# Patient Record
Sex: FEMALE | Race: WHITE | NOT HISPANIC OR LATINO | Employment: PART TIME | URBAN - METROPOLITAN AREA
[De-identification: names, ages, dates, MRNs, and addresses within clinical notes are randomized per-mention and may not be internally consistent; named-entity substitution may affect disease eponyms.]

---

## 2017-06-10 ENCOUNTER — ALLSCRIPTS OFFICE VISIT (OUTPATIENT)
Dept: OTHER | Facility: OTHER | Age: 57
End: 2017-06-10

## 2017-06-22 ENCOUNTER — GENERIC CONVERSION - ENCOUNTER (OUTPATIENT)
Dept: OTHER | Facility: OTHER | Age: 57
End: 2017-06-22

## 2017-06-26 ENCOUNTER — TRANSCRIBE ORDERS (OUTPATIENT)
Dept: ADMINISTRATIVE | Facility: HOSPITAL | Age: 57
End: 2017-06-26

## 2017-06-26 DIAGNOSIS — Z12.31 VISIT FOR SCREENING MAMMOGRAM: Primary | ICD-10-CM

## 2017-07-05 DIAGNOSIS — R92.8 OTHER ABNORMAL AND INCONCLUSIVE FINDINGS ON DIAGNOSTIC IMAGING OF BREAST: ICD-10-CM

## 2017-07-06 ENCOUNTER — HOSPITAL ENCOUNTER (OUTPATIENT)
Dept: ULTRASOUND IMAGING | Facility: CLINIC | Age: 57
Discharge: HOME | End: 2017-07-06
Payer: COMMERCIAL

## 2017-07-06 ENCOUNTER — HOSPITAL ENCOUNTER (OUTPATIENT)
Dept: MAMMOGRAPHY | Facility: CLINIC | Age: 57
Discharge: HOME | End: 2017-07-06
Payer: COMMERCIAL

## 2017-07-06 DIAGNOSIS — Z12.31 VISIT FOR SCREENING MAMMOGRAM: ICD-10-CM

## 2017-07-06 DIAGNOSIS — R92.8 OTHER ABNORMAL AND INCONCLUSIVE FINDINGS ON DIAGNOSTIC IMAGING OF BREAST: ICD-10-CM

## 2017-07-06 PROCEDURE — 77063 BREAST TOMOSYNTHESIS BI: CPT

## 2017-07-06 PROCEDURE — 76642 ULTRASOUND BREAST LIMITED: CPT

## 2017-07-06 PROCEDURE — G0202 SCR MAMMO BI INCL CAD: HCPCS

## 2017-08-04 ENCOUNTER — GENERIC CONVERSION - ENCOUNTER (OUTPATIENT)
Dept: OTHER | Facility: OTHER | Age: 57
End: 2017-08-04

## 2017-08-08 ENCOUNTER — ALLSCRIPTS OFFICE VISIT (OUTPATIENT)
Dept: OTHER | Facility: OTHER | Age: 57
End: 2017-08-08

## 2017-08-09 ENCOUNTER — GENERIC CONVERSION - ENCOUNTER (OUTPATIENT)
Dept: OTHER | Facility: OTHER | Age: 57
End: 2017-08-09

## 2017-08-09 LAB
A/G RATIO (HISTORICAL): 1.8 (ref 1.2–2.2)
ALBUMIN SERPL BCP-MCNC: 4.4 G/DL (ref 3.5–5.5)
ALP SERPL-CCNC: 61 IU/L (ref 39–117)
ALT SERPL W P-5'-P-CCNC: 24 IU/L (ref 0–32)
AST SERPL W P-5'-P-CCNC: 20 IU/L (ref 0–40)
BASOPHILS # BLD AUTO: 0.1 X10E3/UL (ref 0–0.2)
BASOPHILS # BLD AUTO: 2 %
BILIRUB SERPL-MCNC: 0.5 MG/DL (ref 0–1.2)
BUN SERPL-MCNC: 22 MG/DL (ref 6–24)
BUN/CREA RATIO (HISTORICAL): 23 (ref 9–23)
CALCIUM SERPL-MCNC: 10 MG/DL (ref 8.7–10.2)
CHLORIDE SERPL-SCNC: 103 MMOL/L (ref 96–106)
CHOLEST SERPL-MCNC: 202 MG/DL (ref 100–199)
CHOLEST/HDLC SERPL: 3.4 RATIO UNITS (ref 0–4.4)
CO2 SERPL-SCNC: 22 MMOL/L (ref 18–29)
CREAT SERPL-MCNC: 0.97 MG/DL (ref 0.57–1)
DEPRECATED RDW RBC AUTO: 13.3 % (ref 12.3–15.4)
EGFR AFRICAN AMERICAN (HISTORICAL): 76 ML/MIN/1.73
EGFR-AMERICAN CALC (HISTORICAL): 66 ML/MIN/1.73
EOSINOPHIL # BLD AUTO: 0.1 X10E3/UL (ref 0–0.4)
EOSINOPHIL # BLD AUTO: 3 %
GLUCOSE SERPL-MCNC: 107 MG/DL (ref 65–99)
HCT VFR BLD AUTO: 42.6 % (ref 34–46.6)
HDLC SERPL-MCNC: 60 MG/DL
HGB BLD-MCNC: 14.7 G/DL (ref 11.1–15.9)
IMM.GRANULOCYTES (CD4/8) (HISTORICAL): 0 %
IMM.GRANULOCYTES (CD4/8) (HISTORICAL): 0 X10E3/UL (ref 0–0.1)
LDLC SERPL CALC-MCNC: 121 MG/DL (ref 0–99)
LYMPHOCYTES # BLD AUTO: 1.4 X10E3/UL (ref 0.7–3.1)
LYMPHOCYTES # BLD AUTO: 38 %
MCH RBC QN AUTO: 30.9 PG (ref 26.6–33)
MCHC RBC AUTO-ENTMCNC: 34.5 G/DL (ref 31.5–35.7)
MCV RBC AUTO: 90 FL (ref 79–97)
MONOCYTES # BLD AUTO: 0.4 X10E3/UL (ref 0.1–0.9)
MONOCYTES (HISTORICAL): 11 %
NEUTROPHILS # BLD AUTO: 1.7 X10E3/UL (ref 1.4–7)
NEUTROPHILS # BLD AUTO: 46 %
PLATELET # BLD AUTO: 165 X10E3/UL (ref 150–379)
POTASSIUM SERPL-SCNC: 4.3 MMOL/L (ref 3.5–5.2)
RBC (HISTORICAL): 4.75 X10E6/UL (ref 3.77–5.28)
SODIUM SERPL-SCNC: 140 MMOL/L (ref 134–144)
TOT. GLOBULIN, SERUM (HISTORICAL): 2.5 G/DL (ref 1.5–4.5)
TOTAL PROTEIN (HISTORICAL): 6.9 G/DL (ref 6–8.5)
TRIGL SERPL-MCNC: 103 MG/DL (ref 0–149)
TSH SERPL DL<=0.05 MIU/L-ACNC: 3.83 UIU/ML (ref 0.45–4.5)
VLDLC SERPL CALC-MCNC: 21 MG/DL (ref 5–40)
WBC # BLD AUTO: 3.7 X10E3/UL (ref 3.4–10.8)

## 2017-08-11 LAB
BACTERIA UR QL AUTO: ABNORMAL
BILIRUB UR QL STRIP: NEGATIVE
COLOR UR: YELLOW
COMMENT (HISTORICAL): CLEAR
CULTURE RESULT (HISTORICAL): ABNORMAL
FECAL OCCULT BLOOD DIAGNOSTIC (HISTORICAL): NEGATIVE
GLUCOSE (HISTORICAL): NEGATIVE
KETONES UR STRIP-MCNC: NEGATIVE MG/DL
LEUKOCYTE ESTERASE UR QL STRIP: ABNORMAL
MICROSCOPIC EXAMINATION (HISTORICAL): ABNORMAL
MISCELLANEOUS LAB TEST RESULT (HISTORICAL): ABNORMAL
MUCUS THREADS (HISTORICAL): PRESENT
NITRITE UR QL STRIP: NEGATIVE
NON-SQ EPI CELLS URNS QL MICRO: ABNORMAL /HPF
PH UR STRIP.AUTO: 6 [PH] (ref 5–7.5)
PROT UR STRIP-MCNC: NEGATIVE MG/DL
RBC (HISTORICAL): ABNORMAL /HPF
SP GR UR STRIP.AUTO: 1.02 (ref 1–1.03)
URINALYSIS (UA) (HISTORICAL): ABNORMAL
UROBILINOGEN UR QL STRIP.AUTO: 0.2 EU/DL (ref 0.2–1)
WBC # BLD AUTO: ABNORMAL /HPF

## 2017-08-15 ENCOUNTER — APPOINTMENT (OUTPATIENT)
Dept: RADIOLOGY | Facility: CLINIC | Age: 57
End: 2017-08-15
Payer: COMMERCIAL

## 2017-08-15 ENCOUNTER — ALLSCRIPTS OFFICE VISIT (OUTPATIENT)
Dept: OTHER | Facility: OTHER | Age: 57
End: 2017-08-15

## 2017-08-15 DIAGNOSIS — R05.9 COUGH: ICD-10-CM

## 2017-08-15 PROCEDURE — 71020 HB CHEST X-RAY 2VW FRONTAL&LATL: CPT

## 2017-08-22 ENCOUNTER — GENERIC CONVERSION - ENCOUNTER (OUTPATIENT)
Dept: OTHER | Facility: OTHER | Age: 57
End: 2017-08-22

## 2017-11-07 ENCOUNTER — GENERIC CONVERSION - ENCOUNTER (OUTPATIENT)
Dept: FAMILY MEDICINE CLINIC | Facility: CLINIC | Age: 57
End: 2017-11-07

## 2017-12-08 ENCOUNTER — ALLSCRIPTS OFFICE VISIT (OUTPATIENT)
Dept: OTHER | Facility: OTHER | Age: 57
End: 2017-12-08

## 2017-12-09 NOTE — PROGRESS NOTES
Assessment    1  Acute sinusitis (461 9) (J01 90)    Plan  Acute sinusitis    · Amoxicillin-Pot Clavulanate 875-125 MG Oral Tablet (Augmentin); TAKE 1 TABLETEVERY 12 HOURS UNTIL GONE   · MethylPREDNISolone 4 MG Oral Tablet Therapy Pack; take as directed    Discussion/Summary    Augmentin and prednisone as orderedcareas needed  Chief Complaint  Patient complains of sinus pressure for 3 weeks      History of Present Illness  HPI: 2 week history of cold and sinus pain and pressurecough and congestion line not getting any better has gotten worse over the past week      Review of Systems   Constitutional: No fever, no chills, feels well, no tiredness, no recent weight gain or loss  ENT: no ear ache, no loss of hearing, no nosebleeds or nasal discharge, no sore throat or hoarseness  Cardiovascular: no complaints of slow or fast heart rate, no chest pain, no palpitations, no leg claudication or lower extremity edema  Respiratory: no complaints of shortness of breath, no wheezing, no dyspnea on exertion, no orthopnea or PND  Breasts: no complaints of breast pain, breast lump or nipple discharge  Gastrointestinal: no complaints of abdominal pain, no constipation, no nausea or diarrhea, no vomiting, no bloody stools  Genitourinary: no complaints of dysuria, no incontinence, no pelvic pain, no dysmenorrhea, no vaginal discharge or abnormal vaginal bleeding  Musculoskeletal: no complaints of arthralgia, no myalgia, no joint swelling or stiffness, no limb pain or swelling  Integumentary: no complaints of skin rash or lesion, no itching or dry skin, no skin wounds  Neurological: no complaints of headache, no confusion, no numbness or tingling, no dizziness or fainting  Active Problems  1  Cervical cancer screening (V76 2) (Z12 4)   2  Cough (786 2) (R05)   3  Far-sighted, 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)   4  GERD without esophagitis (530 81) (K21 9)   5  Hyperlipidemia (272 4) (E78 5)   6   Seasonal Pattern Depression (311)    Past Medical History    1  History of Acute maxillary sinusitis (461 0) (J01 00)   2  History of Colonoscopy (Fiberoptic) Screening   3  History of Encounter for screening for malignant neoplasm of colon (V76 51) (Z12 11)   4  History of Encounter for screening mammogram for malignant neoplasm of breast (V76 12) (Z12 31)   5  History of Erythema migrans (Lyme disease) (088 81) (A69 20)   6  History of Hallux rigidus (735 2) (M20 20)   7  History of abnormal mammogram (V15 89) (N03 438)   8  History of wheezing (V12 69) (Z87 898)   9  History of Trigger finger (727 03) (M65 30)  Active Problems And Past Medical History Reviewed: The active problems and past medical history were reviewed and updated today  Family History  Mother    1  Family history of cardiac disorder (V17 49) (Z82 49)  Father    2  Family history of hypertension (V17 49) (Z82 49)   3  Family history of renal failure (V18 69) (Z84 1)   4  No family history of mental disorder  Family History    5  Family history of Coronary Artery Disease (V17 49)   6  Family history of Hypertension (V17 49)  Family History Reviewed: The family history was reviewed and updated today  Social History   · Being A Social Drinker   · Denies alcohol use causing problems   · Exercises regularly   · Never A Smoker   · Occupation:  The social history was reviewed and updated today  The social history was reviewed and is unchanged  Surgical History    1  History of Gynecologic Serv Endometrial Cryoablation W/ Ultrason Guid   2  History of Knee Arthroplasty   3  History of Tonsillectomy With Adenoidectomy  Surgical History Reviewed: The surgical history was reviewed and updated today  Current Meds   1  Atorvastatin Calcium 40 MG Oral Tablet; Take one tab daily; Therapy: 42WGE0597 to (Evaluate:17Jwz5837)  Requested for: 49FVW1754; Last Rx:13Nov2017 Ordered    The medication list was reviewed and updated today  Allergies  1   No Known Drug Allergies    Vitals   Recorded: 17KJL7597 01:45PM   Temperature 97 5 F, Tympanic   Heart Rate 72   Systolic 710, RUE, Sitting   Diastolic 76, RUE, Sitting   Height 5 ft 3 in   Weight 194 lb    BMI Calculated 34 37   BSA Calculated 1 91       Physical Exam   Constitutional  General appearance: No acute distress, well appearing and well nourished  Ears, Nose, Mouth, and Throat  Otoscopic examination: Abnormal  -- Swollen tympanic membranes bilateral with fluid  Nasal mucosa, septum, and turbinates: Abnormal  -- Swollen turbinates with pain around cheeks and forehead  Oropharynx: Normal with no erythema, edema, exudate or lesions           Signatures   Electronically signed by : Mic Fritz DO; Dec  8 2017  7:26PM EST                       (Author)

## 2018-01-09 NOTE — MISCELLANEOUS
Message   Recorded as Task   Date: 08/22/2017 01:16 PM, Created By: Pino Nunez   Task Name: Call Back   Assigned To: Nehal Casey   Regarding Patient: GUSTAVO WASHINGTON, Status: Active   Comment:    Shantel,April - 22 Aug 2017 1:16 PM     TASK CREATED  Caller: Self; Results Inquiry; [phone] (Home); [phone] (Mobile Phone)  requesting results to recent chest xray complted last week  dr Shellie Diallo has order this     can try cell phone and then home phone number  Vandana Hu - 22 Aug 2017 4:42 PM     TASK REASSIGNED: Previously Assigned To Vandana Hu  The CXR is normal- she should follow up next week with Dr Thompson Conner if she is still having an issue  Nehal Casey - 22 Aug 2017 7:16 PM     TASK EDITED  Patient advised:  CXR normal    PLM        Active Problems    1  Cough (786 2) (R05)   2  GERD without esophagitis (530 81) (K21 9)   3  Hyperlipidemia (272 4) (E78 5)   4  Seasonal Pattern Depression (311)    Current Meds   1  Atorvastatin Calcium 40 MG Oral Tablet; Take one tab daily; Therapy: 68IUN4706 to (Evaluate:64Ihy6220)  Requested for: 63Yrf4585; Last   Rx:88Qbp3541 Ordered    Allergies    1   No Known Drug Allergies    Signatures   Electronically signed by : Asiya Ayala, ; Aug 22 2017  7:16PM EST                       (Author)

## 2018-01-10 NOTE — PROGRESS NOTES
Assessment    1  Encounter for preventive health examination (V70 0) (Z00 00)    Plan  Health Maintenance    · Always use a seat belt and shoulder strap when riding or driving a motor vehicle ;  Status:Complete;   Done: 51KCT7756   · Be sure to tell us about all herbal supplements you use ; Status:Complete;   Done:  63ZNO1473   · Begin a limited exercise program ; Status:Complete;   Done: 35EEQ4000   · Begin or continue regular aerobic exercise  Gradually work up to at least 3 sessions of 30  minutes of exercise a week ; Status:Complete;   Done: 94BOF8237   · Brush your teeth freq1 and floss at least once a day ; Status:Complete;   Done:  20QEJ4563   · Drink plenty of fluids ; Status:Complete;   Done: 01AIV7226   · Limit your use of alcohol to 2 drinks or cans of beer a day ; Status:Complete;   Done:  81ROZ5193   · Regular aerobic exercise can help reduce stress ; Status:Complete;   Done: 96FDB9052   · Some eating tips that can help you lose weight ; Status:Complete;   Done: 11PND0956   · Use a sun block product with an SPF of 15 or more ; Status:Complete;   Done: 56RIH3228   · Vitamins can help you get daily requirements that your diet may not be giving you ;  Status:Complete;   Done: 98WPJ3696   · We encourage all of our patients to exercise regularly  30 minutes of exercise or physical  activity five or more days a week is recommended for children and adults ;  Status:Complete;   Done: 08MDA4203   · We recommend a colonoscopy ; Status:Complete;   Done: 63TCS5946   · We recommend that you bring your body mass index down to 26 ; Status:Complete;    Done: 08CRM2543   · We recommend that you follow the "Mediterranean diet "; Status:Complete;   Done:  50MGP9258   · We recommend that you follow these rules for gun safety ; Status:Complete;   Done:  30CUF0286   · We recommend that you follow these steps to lower your risk of osteoporosis  ;  Status:Complete;   Done: 38CZA8950   · We recommend that you get 400 IU of Vitamin D each day ; Status:Complete;   Done:  08ACH0309   · Call [phone] if: You find a new or different kind of lump in your breast ;  Status:Complete;   Done: 93CDB2789   · Call [phone] if: You have any bleeding from the vagina ; Status:Complete;    Done: 18RJW4392   · Call [phone] if: You have any warning signs of skin cancer ; Status:Complete;    Done: 03BJO8606   · Call [phone] if: You experience a new kind of chest pain (angina) or pressure ;  Status:Complete;   Done: 59XJZ7500   · Follow-up visit in 1 year Evaluation and Treatment  Follow-up  Status: Hold For -  Scheduling  Requested for: [address] [address] Maintenance, Hyperlipidemia    · (LC) Thyroxine (T4) Free, Direct, S; Status:Active; Requested for:07Cmm3583;   Influenza vaccine needed    · Stop: Influenza  Need for pneumococcal vaccination    · Pneumo (Pneumovax)    Discussion/Summary  health maintenance visit Currently, she eats a healthy diet and has an adequate exercise regimen  the risks and benefits of cervical cancer screening were discussed cervical cancer screening is current Breast cancer screening: the risks and benefits of breast cancer screening were discussed, mammogram is current and breast cancer screening is managed by  Colorectal cancer screening: the risks and benefits of colorectal cancer screening were discussed and colorectal cancer screening is current  Osteoporosis screening: the risks and benefits of osteoporosis screening were discussed and bone mineral density testing is current  Screening lab work includes hemoglobin, glucose, lipid profile, thyroid function testing and urinalysis  The immunizations are needed and Pneumovax 23 ordered today  Advice and education were given regarding nutrition, aerobic exercise, weight bearing exercise and weight loss  Patient discussion: discussed with the patient       30-year-old female here for comprehensive physical examination  Examination is performed findings largely normal is slightly overweight  She follows with the gynecologist  She has had all the usual preventive services  I reviewed her recent labs and they're all in order however I'm ordering a free T4 because of elevation in her TSH  Will continue on present medications 23 valve on today  Will reappoint in one year  Chief Complaint  Annual Complete Physical Exam  Preventive care: up to date, refuses flu vaccines      History of Present Illness  HM, Adult Female: The patient is being seen for a health maintenance evaluation  The last health maintenance visit was 3yrs year(s) ago  Social History: Household members include spouse  She is   Work status: working full time  The patient has never smoked cigarettes  She reports occasional alcohol use  She has never used illicit drugs  General Health: The patient's health since the last visit is described as good  She has regular dental visits  She denies vision problems  She denies hearing loss  Lifestyle:  She consumes a diverse and healthy diet  She has weight concerns  She exercises regularly  She does not use tobacco  She consumes alcohol  She denies drug use  Reproductive health: the patient is postmenopausal    Screening: cancer screening reviewed and current  metabolic screening reviewed and current  risk screening reviewed and current  HPI: 16-year-old female here for annual preventive service  Over the past year there has been no change in her overall condition      Review of Systems    Constitutional: No fever, no chills, feels well, no tiredness, no recent weight gain or weight loss  Eyes: No complaints of eye pain, no red eyes, no eyesight problems, no discharge, no dry eyes, no itching of eyes  ENT: no complaints of earache, no loss of hearing, no nose bleeds, no nasal discharge, no sore throat, no hoarseness     Cardiovascular: No complaints of slow heart rate, no fast heart rate, no chest pain, no palpitations, no leg claudication, no lower extremity edema  Respiratory: No complaints of shortness of breath, no wheezing, no cough, no SOB on exertion, no orthopnea, no PND  Gastrointestinal: No complaints of abdominal pain, no constipation, no nausea or vomiting, no diarrhea, no bloody stools  Genitourinary: No complaints of dysuria, no incontinence, no pelvic pain, no dysmenorrhea, no vaginal discharge or bleeding  Musculoskeletal: No complaints of arthralgias, no myalgias, no joint swelling or stiffness, no limb pain or swelling  Integumentary: No complaints of skin rash or lesions, no itching, no skin wounds, no breast pain or lump  Neurological: No complaints of headache, no confusion, no convulsions, no numbness, no dizziness or fainting, no tingling, no limb weakness, no difficulty walking  Psychiatric: Not suicidal, no sleep disturbance, no anxiety or depression, no change in personality, no emotional problems  Endocrine: No complaints of proptosis, no hot flashes, no muscle weakness, no deepening of the voice, no feelings of weakness  Hematologic/Lymphatic: No complaints of swollen glands, no swollen glands in the neck, does not bleed easily, does not bruise easily  Active Problems    1  GERD without esophagitis (530 81) (K21 9)   2  Hyperlipidemia (272 4) (E78 5)   3   Seasonal Pattern Depression (311)    Past Medical History    · History of Colonoscopy (Fiberoptic) Screening   · History of Encounter for screening for malignant neoplasm of colon (V76 51) (Z12 11)   · History of Encounter for screening mammogram for malignant neoplasm of breast  (V76 12) (Z12 31)   · History of Erythema migrans (Lyme disease) (088 81) (A69 20)   · History of Hallux rigidus (735 2) (M20 20)   · History of Trigger finger (727 03) (M65 30)    Surgical History    · History of Gynecologic Serv Endometrial Cryoablation W/ Ultrason Guid   · History of Knee Arthroplasty   · History of Tonsillectomy With Adenoidectomy    Family History  Mother · Family history of cardiac disorder (V17 49) (Z80 55)  Father    · Family history of hypertension (V17 49) (Z82 49)   · Family history of renal failure (V18 69) (Z84 1)  Family History    · Family history of Coronary Artery Disease (V17 49)   · Family history of Hypertension (V17 49)    Social History    · Being A Social Drinker   · Exercises regularly   · Never A Smoker   · Occupation:    Current Meds   1  Atorvastatin Calcium 40 MG Oral Tablet; Take one tab daily; Therapy: 86GBD6744 to (Evaluate:38Ayd5777)  Requested for: 99LST3053; Last   Rx:91Anb3858 Ordered   2  Citalopram Hydrobromide 20 MG Oral Tablet; 1-1/2 tablets daily  Requested for:   11OKF2592; Last Rx:63Jtm6848 Ordered    Allergies    1  No Known Drug Allergies    Vitals   Recorded: 62WLT1181 12:07FC   Systolic 996, LUE, Sitting   Diastolic 76, LUE, Sitting   Heart Rate 70   Height 5 ft 3 in   Weight 181 lb    BMI Calculated 32 06   BSA Calculated 1 86     Physical Exam    Constitutional   General appearance: No acute distress, well appearing and well nourished  Head and Face   Head and face: Normal     Palpation of the face and sinuses: No sinus tenderness  Eyes   Conjunctiva and lids: No swelling, erythema or discharge  Pupils and irises: Equal, round, reactive to light  Ears, Nose, Mouth, and Throat   External inspection of ears and nose: Normal     Otoscopic examination: Tympanic membranes translucent with normal light reflex  Canals patent without erythema  Hearing: Normal     Nasal mucosa, septum, and turbinates: Normal without edema or erythema  Lips, teeth, and gums: Normal, good dentition  Oropharynx: Normal with no erythema, edema, exudate or lesions  Neck   Neck: Supple, symmetric, trachea midline, no masses  Thyroid: Normal, no thyromegaly  Pulmonary   Auscultation of lungs: Clear to auscultation  Cardiovascular   Auscultation of heart: Normal rate and rhythm, normal S1 and S2, no murmurs      Carotid pulses: 2+ bilaterally  Abdominal aorta: Normal     Femoral pulses: 2+ bilaterally  Pedal pulses: 2+ bilaterally  Peripheral vascular exam: Normal     Examination of extremities for edema and/or varicosities: Normal     Abdomen   Abdomen: Non-tender, no masses  Liver and spleen: No hepatomegaly or splenomegaly  Lymphatic   Palpation of lymph nodes in neck: No lymphadenopathy  Palpation of lymph nodes in axillae: No lymphadenopathy  Palpation of lymph nodes in groin: No lymphadenopathy  Palpation of lymph nodes in other areas: No lymphadenopathy  Musculoskeletal   Gait and station: Normal     Digits and nails: Normal without clubbing or cyanosis  Joints, bones, and muscles: Normal     Range of motion: Normal     Stability: Normal     Muscle strength/tone: Normal     Skin   Skin and subcutaneous tissue: Normal without rashes or lesions  Palpation of skin and subcutaneous tissue: Normal turgor  Neurologic   Cranial nerves: Cranial nerves II-XII intact  Cortical function: Normal mental status  Reflexes: 2+ and symmetric  Sensation: No sensory loss  Coordination: Normal finger to nose and heel to shin  Psychiatric   Judgment and insight: Normal     Orientation to person, place, and time: Normal     Recent and remote memory: Intact      Mood and affect: Normal        Results/Data  PELVIC EXAM [id number] 12:00AM Letty Flynn     Test Name Result Flag Reference   PELVIC EXAM 6/8/15       (1) THIN PREP PAP WITH IMAGING 54OPF3649 12:00AM Letty Flynn     Test Name Result Flag Reference   THIN PREP PAP W  IMAG COMMENT 5/15/13     LAB AP CASE REPORT 5/15/13     HPV HIGH RISK RESULT 5/15/13     LAB AP GYN PRIMARY INTERPRETATION 5/15/13     LAB AP GYN INTERPRETATION 5/15/13     LAB AP GYN SPECIMEN ADEQUACY 5/15/13     LAB AP GYN NOTE 5/15/13     LAB AP GYN ADDITIONAL INFORMATION 5/15/13     LAB AP CLINICAL INFORMATION 5/15/13         Procedure    Procedure: Visual Acuity Test    Indication: routine screening  Inforrmation supplied by a Snellen chart  Results: 20/20 in the right eye with corrective device, 20/20 in the left eye with corrective device Patient wears glasses  Health Management  Hyperlipidemia   (1) COMPREHENSIVE METABOLIC PANEL; every 1 year; Last 40PXF3254; Next Due:  53JNM7477; Active  (1) HEPATIC FUNCTION PANEL; every 1 year; Last 90IZM5959; Next Due: 58Csw6352; Overdue    Future Appointments    Date/Time Provider Specialty Site   08/15/2017 08:00 AM BRE Rodriguez   Family Medicine [facility], [place], operated [facility]     Signatures   Electronically signed by : BRE Duggan ; Jul 18 2016  8:40AM EST                       (Author)

## 2018-01-12 NOTE — MISCELLANEOUS
Message   Recorded as Task   Date: 07/25/2016 04:00 PM, Created By: Edwina Espinosa   Task Name: Call Back   Assigned To: Nehal Casey   Regarding Patient: GUSTAVO WASHINGTON, Status: Active   CommentPasctobi Rodrigues - 25 Jul 2016 4:00 PM     TASK CREATED  Caller: Self; Results Inquiry; [phone] (Home); [phone] (Work)  Calling for Thyroid blood work results  home [phone] or cell [phone]   Jose Garcia - 25 Jul 2016 5:12 PM     TASK REASSIGNED: Previously Assigned To [employer] her the free T4 was normal therefore does not have a thyroid problem  I think she should have her retest of thyroid function in 6 months   Nehal Casey - 25 Jul 2016 5:26 PM     TASK EDITED  Patient advised  PLM        Active Problems    1  GERD without esophagitis (530 81) (K21 9)   2  Hyperlipidemia (272 4) (E78 5)   3  Influenza vaccine needed (V04 81) (Z23)   4  Need for pneumococcal vaccination (V03 82) (Z23)   5  Seasonal Pattern Depression (311)    Current Meds   1  Atorvastatin Calcium 40 MG Oral Tablet; Take one tab daily; Therapy: 83JBL2939 to (Evaluate:13Qvk7976)  Requested for: 46BYD0992; Last   Rx:42Ols8337 Ordered   2  Citalopram Hydrobromide 20 MG Oral Tablet; 1-1/2 tablets daily  Requested for:   95MMB0787; Last Rx:71Ioe1158 Ordered    Allergies    1   No Known Drug Allergies    Signatures   Electronically signed by : Maksim Roberts, ; Jul 25 2016  5:26PM EST                       (Author)

## 2018-01-13 VITALS
TEMPERATURE: 99.2 F | DIASTOLIC BLOOD PRESSURE: 82 MMHG | WEIGHT: 191 LBS | HEART RATE: 72 BPM | BODY MASS INDEX: 33.84 KG/M2 | HEIGHT: 63 IN | SYSTOLIC BLOOD PRESSURE: 126 MMHG

## 2018-01-15 NOTE — PROGRESS NOTES
Assessment    1  Encounter for preventive health examination (V70 0) (Z00 00)   2  Cough (786 2) (R05)    Plan  Cough    · * XR CHEST PA & LATERAL; Status:Active; Requested for:03Jvh3474; Health Maintenance    · Always use a seat belt and shoulder strap when riding or driving a motor vehicle ;  Status:Complete;   Done: 07VQP8857   · Be sure to tell us about all herbal supplements you use ; Status:Complete;   Done:  32QDF8515   · Begin a limited exercise program ; Status:Complete;   Done: 74WSV4981   · Begin or continue regular aerobic exercise  Gradually work up to at least 3 sessions of 30  minutes of exercise a week ; Status:Complete;   Done: 99QIZ2376   · Brush your teeth {freq1} and floss at least once a day ; Status:Complete;   Done:  95XHS8495   · Drink plenty of fluids ; Status:Complete;   Done: 23EEX3109   · Limit your use of alcohol to 2 drinks or cans of beer a day ; Status:Complete;   Done:  82JEE3596   · Regular aerobic exercise can help reduce stress ; Status:Complete;   Done: 47RLM6068   · Some eating tips that can help you lose weight ; Status:Complete;   Done: 34ZRP9925   · Use a sun block product with an SPF of 15 or more ; Status:Complete;   Done:  19PLF3183   · Vitamins can help you get daily requirements that your diet may not be giving you ;  Status:Complete;   Done: 05XDZ7083   · We encourage all of our patients to exercise regularly    30 minutes of exercise or physical  activity five or more days a week is recommended for children and adults ;  Status:Complete;   Done: 32BJW7027   · We recommend a colonoscopy ; Status:Complete;   Done: 20QQN7256   · We recommend that you bring your body mass index down to 26 ; Status:Complete;    Done: 15IQE0968   · We recommend that you follow the "Mediterranean diet "; Status:Complete;   Done:  38KPY9708   · We recommend that you follow these rules for gun safety ; Status:Complete;   Done:  80CAG7555   · We recommend that you follow these steps to lower your risk of osteoporosis  ;  Status:Complete;   Done: 09FUD6356   · We recommend that you get 400 IU of Vitamin D each day ; Status:Complete;   Done:  08GEU2866   · Call [phone] if: You find a new or different kind of lump in your breast ;  Status:Complete;   Done: 45RBN4155   · Call [phone] if: You have any bleeding from the vagina ; Status:Complete;    Done: 81CCP0650   · Call [phone] if: You have any warning signs of skin cancer ; Status:Complete;    Done: 25PRG8569   · Call [phone] if: You experience a new kind of chest pain (angina) or pressure ;  Status:Complete;   Done: 60YKU6420    Discussion/Summary  health maintenance visit Currently, she eats a healthy diet and has an adequate exercise regimen  the risks and benefits of cervical cancer screening were discussed cervical cancer screening is current Breast cancer screening: the risks and benefits of breast cancer screening were discussed and self breast exam technique was taught  Osteoporosis screening: the risks and benefits of osteoporosis screening were discussed and bone mineral density testing is current  Screening lab work includes hemoglobin, glucose, lipid profile, thyroid function testing, 25-hydroxyvitamin D and urinalysis  The risks and benefits of immunizations were discussed and immunizations are up to date  She was advised to be evaluated by GYN  Advice and education were given regarding nutrition, aerobic exercise, weight bearing exercise and weight loss  Patient discussion: discussed with the patient  Hepatitis C Screening:  The patient declines Hepatitis C screening       72-year-old female here for comprehensive physical examination  She is had all the usual preventive services I reviewed her recent labs  She follows with the gynecologist  She has a persisting ticklish cough from a respiratory infection incurred 2 months ago and I will order a chest x-ray  She has some discomfort in her left knee from a recent sprain have advised heat and Advil she will see the orthopedist if this does not clear up  Any case if her cough worsens or persists she will let me know  I reviewed her present medications and overall management will make no changes  Will reappoint in one year  Chief Complaint  Complete Annual Physical      History of Present Illness  , Adult Female: The patient is being seen for a health maintenance evaluation  The last health maintenance visit was 1 year(s) ago  Social History: She is   Work status: working part-time  The patient has never smoked cigarettes  General Health: The patient's health since the last visit is described as good  She has regular dental visits  She denies vision problems  She denies hearing loss  Lifestyle:  She consumes a diverse and healthy diet  She has weight concerns  She exercises regularly  She does not use tobacco  She consumes alcohol  She denies drug use  Reproductive health: the patient is postmenopausal    Screening: cancer screening reviewed and current  metabolic screening reviewed and current  risk screening reviewed and current  HPI: 65-year-old female here for annual physical examination  Since last exam one year ago there's been no essential change in her condition      Review of Systems    Constitutional: No fever, no chills, feels well, no tiredness, no recent weight gain or weight loss  Eyes: No complaints of eye pain, no red eyes, no eyesight problems, no discharge, no dry eyes, no itching of eyes  ENT: no complaints of earache, no loss of hearing, no nose bleeds, no nasal discharge, no sore throat, no hoarseness  Cardiovascular: No complaints of slow heart rate, no fast heart rate, no chest pain, no palpitations, no leg claudication, no lower extremity edema  Respiratory: cough, wheezing and Nation has a persisting cough and questionable wheeze intermittently since respiratory infection 2 months ago     Gastrointestinal: No complaints of abdominal pain, no constipation, no nausea or vomiting, no diarrhea, no bloody stools  Genitourinary: No complaints of dysuria, no incontinence, no pelvic pain, no dysmenorrhea, no vaginal discharge or bleeding  Musculoskeletal: No complaints of arthralgias, no myalgias, no joint swelling or stiffness, no limb pain or swelling  Integumentary: No complaints of skin rash or lesions, no itching, no skin wounds, no breast pain or lump  Neurological: No complaints of headache, no confusion, no convulsions, no numbness, no dizziness or fainting, no tingling, no limb weakness, no difficulty walking  Psychiatric: Not suicidal, no sleep disturbance, no anxiety or depression, no change in personality, no emotional problems  Endocrine: No complaints of proptosis, no hot flashes, no muscle weakness, no deepening of the voice, no feelings of weakness  Hematologic/Lymphatic: No complaints of swollen glands, no swollen glands in the neck, does not bleed easily, does not bruise easily  Active Problems    1  GERD without esophagitis (530 81) (K21 9)   2  Hyperlipidemia (272 4) (E78 5)   3   Seasonal Pattern Depression (311)    Past Medical History    · History of Acute maxillary sinusitis (461 0) (J01 00)   · History of Colonoscopy (Fiberoptic) Screening   · History of Encounter for screening for malignant neoplasm of colon (V76 51) (Z12 11)   · History of Encounter for screening mammogram for malignant neoplasm of breast  (V76 12) (Z12 31)   · History of Erythema migrans (Lyme disease) (088 81) (A69 20)   · History of Hallux rigidus (735 2) (M20 20)   · History of abnormal mammogram (V15 89) (J97 472)   · History of wheezing (V12 69) (Z87 898)   · History of Trigger finger (727 03) (M65 30)    Surgical History    · History of Gynecologic Serv Endometrial Cryoablation W/ Ultrason Guid   · History of Knee Arthroplasty   · History of Tonsillectomy With Adenoidectomy    Family History  Mother    · Family history of cardiac disorder (V17 49) (Z82 49)  Father    · Family history of hypertension (V17 49) (Z82 49)   · Family history of renal failure (V18 69) (Z84 1)   · No family history of mental disorder  Family History    · Family history of Coronary Artery Disease (V17 49)   · Family history of Hypertension (V17 49)    Social History    · Being A Social Drinker   · Denies alcohol use causing problems   · Exercises regularly   · Never A Smoker   · Occupation:    Current Meds   1  Atorvastatin Calcium 40 MG Oral Tablet; Take one tab daily; Therapy: 03JMS2263 to (Evaluate:02Qyt5125)  Requested for: 06EAL2382; Last   Rx:13Jun2017 Ordered    Allergies    1  No Known Drug Allergies    Vitals   Recorded: 13Jsf1071 07:56AM   Heart Rate 76   Systolic 727, RUE, Sitting   Diastolic 80, RUE, Sitting   Height 5 ft 3 in   Weight 190 lb    BMI Calculated 33 66   BSA Calculated 1 9     Physical Exam    Constitutional   General appearance: Abnormal   overweight and appearance reflects stated age  Head and Face   Head and face: Normal     Palpation of the face and sinuses: No sinus tenderness  Eyes   Conjunctiva and lids: No swelling, erythema or discharge  Pupils and irises: Equal, round, reactive to light  Ears, Nose, Mouth, and Throat   External inspection of ears and nose: Normal     Otoscopic examination: Tympanic membranes translucent with normal light reflex  Canals patent without erythema  Hearing: Normal     Nasal mucosa, septum, and turbinates: Normal without edema or erythema  Lips, teeth, and gums: Normal, good dentition  Oropharynx: Normal with no erythema, edema, exudate or lesions  Neck   Neck: Supple, symmetric, trachea midline, no masses  Thyroid: Normal, no thyromegaly  Pulmonary   Respiratory effort: No increased work of breathing or signs of respiratory distress  Percussion of chest: Normal     Palpation of chest: Normal     Auscultation of lungs: Clear to auscultation      Cardiovascular Auscultation of heart: Normal rate and rhythm, normal S1 and S2, no murmurs  Carotid pulses: 2+ bilaterally  Abdominal aorta: Normal     Femoral pulses: 2+ bilaterally  Pedal pulses: 2+ bilaterally  Peripheral vascular exam: Normal     Examination of extremities for edema and/or varicosities: Normal     Abdomen   Abdomen: Non-tender, no masses  Liver and spleen: No hepatomegaly or splenomegaly  Lymphatic   Palpation of lymph nodes in neck: No lymphadenopathy  Palpation of lymph nodes in axillae: No lymphadenopathy  Palpation of lymph nodes in groin: No lymphadenopathy  Palpation of lymph nodes in other areas: No lymphadenopathy  Musculoskeletal   Gait and station: Normal     Digits and nails: Normal without clubbing or cyanosis  Joints, bones, and muscles: Normal     Range of motion: Normal     Stability: Normal     Muscle strength/tone: Normal     Skin   Skin and subcutaneous tissue: Normal without rashes or lesions  Palpation of skin and subcutaneous tissue: Normal turgor  Neurologic   Cranial nerves: Cranial nerves II-XII intact  Cortical function: Normal mental status  Reflexes: 2+ and symmetric  Sensation: No sensory loss  Coordination: Normal finger to nose and heel to shin  Psychiatric   Judgment and insight: Normal     Orientation to person, place, and time: Normal     Recent and remote memory: Intact  Mood and affect: Normal        Procedure    Procedure: Visual Acuity Test    Indication: routine screening  Inforrmation supplied by a Snellen chart  Results: 20/20 in the right eye with corrective device, 20/25 in the left eye with corrective device Patient wears glasses  Health Management  Hyperlipidemia   (1) COMPREHENSIVE METABOLIC PANEL; every 1 year; Last 01NTB6345; Next Due:  41Lcm6182; Active  (1) HEPATIC FUNCTION PANEL; every 1 year; Last 10USG2363; Next Due: 80ALK4055;   Overdue    Signatures   Electronically signed by : Robert Shine BRE Avelar ; Aug 15 2017  8:21AM EST                       (Author)

## 2018-01-22 VITALS
BODY MASS INDEX: 33.66 KG/M2 | HEIGHT: 63 IN | DIASTOLIC BLOOD PRESSURE: 80 MMHG | WEIGHT: 190 LBS | SYSTOLIC BLOOD PRESSURE: 132 MMHG | HEART RATE: 76 BPM

## 2018-01-23 VITALS
SYSTOLIC BLOOD PRESSURE: 132 MMHG | HEART RATE: 72 BPM | TEMPERATURE: 97.5 F | WEIGHT: 194 LBS | HEIGHT: 63 IN | BODY MASS INDEX: 34.38 KG/M2 | DIASTOLIC BLOOD PRESSURE: 76 MMHG

## 2018-03-13 ENCOUNTER — OFFICE VISIT (OUTPATIENT)
Dept: FAMILY MEDICINE CLINIC | Facility: CLINIC | Age: 58
End: 2018-03-13
Payer: COMMERCIAL

## 2018-03-13 VITALS
OXYGEN SATURATION: 97 % | RESPIRATION RATE: 18 BRPM | HEIGHT: 64 IN | BODY MASS INDEX: 32.78 KG/M2 | HEART RATE: 83 BPM | DIASTOLIC BLOOD PRESSURE: 74 MMHG | WEIGHT: 192 LBS | SYSTOLIC BLOOD PRESSURE: 120 MMHG

## 2018-03-13 DIAGNOSIS — M20.22 HALLUX RIGIDUS OF LEFT FOOT: Primary | ICD-10-CM

## 2018-03-13 PROCEDURE — 1036F TOBACCO NON-USER: CPT | Performed by: NURSE PRACTITIONER

## 2018-03-13 PROCEDURE — 3008F BODY MASS INDEX DOCD: CPT | Performed by: NURSE PRACTITIONER

## 2018-03-13 PROCEDURE — 99213 OFFICE O/P EST LOW 20 MIN: CPT | Performed by: NURSE PRACTITIONER

## 2018-03-13 RX ORDER — NAPROXEN 250 MG/1
250 TABLET ORAL 2 TIMES DAILY WITH MEALS
Qty: 20 TABLET | Refills: 0 | Status: SHIPPED | OUTPATIENT
Start: 2018-03-13 | End: 2018-12-21

## 2018-03-13 RX ORDER — ATORVASTATIN CALCIUM 40 MG/1
1 TABLET, FILM COATED ORAL DAILY
COMMUNITY
Start: 2014-07-17 | End: 2018-12-21 | Stop reason: SDUPTHER

## 2018-03-13 RX ORDER — DIPHENOXYLATE HYDROCHLORIDE AND ATROPINE SULFATE 2.5; .025 MG/1; MG/1
1 TABLET ORAL
COMMUNITY

## 2018-03-13 NOTE — PROGRESS NOTES
Assessment/Plan   Diagnoses and all orders for this visit:    Hallux rigidus of left foot  -     naproxen (NAPROSYN) 250 mg tablet; Take 1 tablet (250 mg total) by mouth 2 (two) times a day with meals  -     Ambulatory referral to Podiatry; Future    Other orders  -     atorvastatin (LIPITOR) 40 mg tablet; Take 1 tablet by mouth daily  -     multivitamin (THERAGRAN) TABS; Take 1 tablet by mouth daily        Chief Complaint   Patient presents with    Toe Pain     left big toe pain        Subjective   Patient ID: Joselito Terry is a 56 y o  female  Vitals:    03/13/18 0949   BP: 120/74   Pulse: 83   Resp: 18   SpO2: 97%     Toe Pain    The incident occurred more than 1 week ago (States a chronic problem was diagnosed with Hallus Rigidus 10 years ago and saw podiatry but was not ready for the suggested treatment, pain now increaseing over the last year )  The incident occurred at home  There was no injury mechanism  The pain is present in the left toes  The pain is at a severity of 5/10  The pain is moderate  The pain has been fluctuating since onset  Associated symptoms include a loss of motion  Pertinent negatives include no inability to bear weight, loss of sensation, muscle weakness, numbness or tingling  She reports no foreign bodies present  The symptoms are aggravated by movement  She has tried elevation and NSAIDs for the symptoms  The treatment provided mild relief  The following portions of the patient's history were reviewed and updated as appropriate: allergies, current medications, past medical history, past social history, past surgical history and problem list     Review of Systems   Constitutional: Positive for activity change (decreased r/t pain)  Negative for appetite change, chills and fatigue  HENT: Negative  Eyes: Negative  Respiratory: Negative  Cardiovascular: Negative  Gastrointestinal: Negative  Endocrine: Negative  Genitourinary: Negative      Musculoskeletal: Positive for arthralgias  Skin: Negative  Allergic/Immunologic: Negative  Neurological: Negative  Negative for tingling and numbness  Hematological: Negative  Psychiatric/Behavioral: Negative  Objective     Physical Exam   Constitutional: She is oriented to person, place, and time  She appears well-developed and well-nourished  No distress  HENT:   Head: Normocephalic  Eyes: Conjunctivae are normal  Right eye exhibits no discharge  Left eye exhibits no discharge  Neck: Normal range of motion  Neck supple  Cardiovascular: Normal rate and regular rhythm  Exam reveals no friction rub  No murmur heard  Pulmonary/Chest: Effort normal and breath sounds normal  No respiratory distress  She has no wheezes  Musculoskeletal: She exhibits tenderness (tender to palpation, raised and red at first metarsal of great left toe,w ithout warmth ) and deformity  Neurological: She is alert and oriented to person, place, and time  Skin: Skin is warm and dry  Capillary refill takes less than 2 seconds  No rash noted  She is not diaphoretic  Psychiatric: She has a normal mood and affect   Her behavior is normal  Judgment and thought content normal

## 2018-03-26 ENCOUNTER — APPOINTMENT (OUTPATIENT)
Dept: RADIOLOGY | Facility: CLINIC | Age: 58
End: 2018-03-26
Payer: COMMERCIAL

## 2018-03-26 DIAGNOSIS — M79.673 PAIN IN FOOT: ICD-10-CM

## 2018-03-26 PROCEDURE — 73630 X-RAY EXAM OF FOOT: CPT

## 2018-07-10 ENCOUNTER — HOSPITAL ENCOUNTER (OUTPATIENT)
Dept: MAMMOGRAPHY | Facility: CLINIC | Age: 58
Discharge: HOME | End: 2018-07-10
Payer: COMMERCIAL

## 2018-07-10 DIAGNOSIS — Z12.39 ENCOUNTER FOR SCREENING FOR MALIGNANT NEOPLASM OF BREAST: ICD-10-CM

## 2018-07-10 DIAGNOSIS — R92.8 OTHER ABNORMAL AND INCONCLUSIVE FINDINGS ON DIAGNOSTIC IMAGING OF BREAST: ICD-10-CM

## 2018-07-10 PROCEDURE — 77067 SCR MAMMO BI INCL CAD: CPT

## 2018-07-10 PROCEDURE — 77063 BREAST TOMOSYNTHESIS BI: CPT

## 2018-11-06 ENCOUNTER — IMMUNIZATION (OUTPATIENT)
Dept: FAMILY MEDICINE CLINIC | Facility: CLINIC | Age: 58
End: 2018-11-06
Payer: COMMERCIAL

## 2018-11-06 DIAGNOSIS — Z23 ENCOUNTER FOR IMMUNIZATION: ICD-10-CM

## 2018-11-06 PROCEDURE — 90471 IMMUNIZATION ADMIN: CPT

## 2018-11-06 PROCEDURE — 90682 RIV4 VACC RECOMBINANT DNA IM: CPT

## 2018-11-12 DIAGNOSIS — M79.673 PAIN OF FOOT, UNSPECIFIED LATERALITY: Primary | ICD-10-CM

## 2018-12-19 ENCOUNTER — HOSPITAL ENCOUNTER (OUTPATIENT)
Dept: NON INVASIVE DIAGNOSTICS | Facility: CLINIC | Age: 58
Discharge: HOME | End: 2018-12-19
Payer: COMMERCIAL

## 2018-12-19 ENCOUNTER — TRANSCRIBE ORDERS (OUTPATIENT)
Dept: ADMINISTRATIVE | Facility: HOSPITAL | Age: 58
End: 2018-12-19

## 2018-12-19 DIAGNOSIS — Z01.818 PREOP EXAMINATION: ICD-10-CM

## 2018-12-19 DIAGNOSIS — Z01.818 PREOP EXAMINATION: Primary | ICD-10-CM

## 2018-12-19 PROCEDURE — 93005 ELECTROCARDIOGRAM TRACING: CPT

## 2018-12-21 ENCOUNTER — OFFICE VISIT (OUTPATIENT)
Dept: FAMILY MEDICINE CLINIC | Facility: CLINIC | Age: 58
End: 2018-12-21
Payer: COMMERCIAL

## 2018-12-21 VITALS
TEMPERATURE: 98.1 F | RESPIRATION RATE: 13 BRPM | BODY MASS INDEX: 32.58 KG/M2 | OXYGEN SATURATION: 98 % | SYSTOLIC BLOOD PRESSURE: 118 MMHG | DIASTOLIC BLOOD PRESSURE: 70 MMHG | HEART RATE: 71 BPM | HEIGHT: 64 IN | WEIGHT: 190.8 LBS

## 2018-12-21 DIAGNOSIS — M20.22 HALLUX RIGIDUS OF LEFT FOOT: Primary | ICD-10-CM

## 2018-12-21 DIAGNOSIS — E78.5 HYPERLIPIDEMIA, UNSPECIFIED HYPERLIPIDEMIA TYPE: ICD-10-CM

## 2018-12-21 PROBLEM — M20.20 HALLUX RIGIDUS: Status: ACTIVE | Noted: 2018-12-21

## 2018-12-21 LAB
ATRIAL RATE: 65 BPM
P AXIS: 36 DEGREES
PR INTERVAL: 154 MS
QRS AXIS: 26 DEGREES
QRSD INTERVAL: 92 MS
QT INTERVAL: 420 MS
QTC INTERVAL: 436 MS
T WAVE AXIS: 34 DEGREES
VENTRICULAR RATE: 65 BPM

## 2018-12-21 PROCEDURE — 93010 ELECTROCARDIOGRAM REPORT: CPT | Performed by: INTERNAL MEDICINE

## 2018-12-21 PROCEDURE — 99214 OFFICE O/P EST MOD 30 MIN: CPT | Performed by: FAMILY MEDICINE

## 2018-12-21 RX ORDER — ATORVASTATIN CALCIUM 40 MG/1
40 TABLET, FILM COATED ORAL DAILY
Qty: 90 TABLET | Refills: 1 | Status: SHIPPED | OUTPATIENT
Start: 2018-12-21 | End: 2019-11-25 | Stop reason: SDUPTHER

## 2018-12-21 NOTE — PROGRESS NOTES
Assessment/Plan:     Diagnoses and all orders for this visit:    Hallux rigidus of left foot    Hyperlipidemia, unspecified hyperlipidemia type  -     atorvastatin (LIPITOR) 40 mg tablet; Take 1 tablet (40 mg total) by mouth daily        Patient is medically cleared for surgery  I reviewed her EKG which is normal I also reviewed her lab work which is also normal  We refilled her atorvastatin today  She has no acute physical complaints  She can follow-up as scheduled for her yearly physical  I have spent 40 minutes with Patient  today in which greater than 50% of this time was spent in counseling/coordination of care regarding Impressions  Subjective:     Chief Complaint   Patient presents with    Pre-op Exam     pre op clearance with Dr Kermit Beth on 01/18/2019 for left foot toe fusion         Patient ID: Hoda Loja is a 66 y o  female  Patient here today for preop clearance for left toe surgery  She has no acute physical complaints  She had a preop testing done and was reviewed and is normal  She does need a refill of her atorvastatin today        The following portions of the patient's history were reviewed and updated as appropriate: allergies, current medications, past family history, past medical history, past social history, past surgical history and problem list     Review of Systems   Constitutional: Negative  HENT: Negative  Eyes: Negative  Respiratory: Negative  Cardiovascular: Negative  Gastrointestinal: Negative  Endocrine: Negative  Genitourinary: Negative  Musculoskeletal: Negative  Skin: Negative  Allergic/Immunologic: Negative  Neurological: Negative  Hematological: Negative  Psychiatric/Behavioral: Negative  All other systems reviewed and are negative          Objective:    Vitals:    12/21/18 1016   BP: 118/70   BP Location: Left arm   Patient Position: Sitting   Cuff Size: Standard   Pulse: 71   Resp: 13   Temp: 98 1 °F (36 7 °C) TempSrc: Tympanic   SpO2: 98%   Weight: 86 5 kg (190 lb 12 8 oz)   Height: 5' 4" (1 626 m)          Physical Exam   Constitutional: She is oriented to person, place, and time  She appears well-developed and well-nourished  HENT:   Head: Normocephalic and atraumatic  Right Ear: External ear normal    Left Ear: External ear normal    Mouth/Throat: Oropharynx is clear and moist    Eyes: Pupils are equal, round, and reactive to light  Conjunctivae and EOM are normal    Neck: Normal range of motion  Cardiovascular: Normal rate, regular rhythm and normal heart sounds  Pulmonary/Chest: Effort normal and breath sounds normal    Abdominal: Soft  Bowel sounds are normal    Musculoskeletal: Normal range of motion  Neurological: She is alert and oriented to person, place, and time  She has normal reflexes  Skin: Skin is warm and dry  Psychiatric: She has a normal mood and affect  Her behavior is normal  Judgment and thought content normal    Nursing note and vitals reviewed

## 2019-01-15 RX ORDER — ACETAMINOPHEN 325 MG/1
650 TABLET ORAL EVERY 6 HOURS PRN
COMMUNITY
End: 2019-01-18 | Stop reason: HOSPADM

## 2019-01-15 NOTE — PRE-PROCEDURE INSTRUCTIONS
Pre-Surgery Instructions:   Medication Instructions    acetaminophen (TYLENOL) 325 mg tablet Instructed patient per Anesthesia Guidelines   atorvastatin (LIPITOR) 40 mg tablet Instructed patient per Anesthesia Guidelines   multivitamin (THERAGRAN) TABS Instructed patient per Anesthesia Guidelines   NUTRITIONAL SUPPLEMENTS PO Instructed patient per Anesthesia Guidelines  Patient instructed no medication needed the morning of surgery  Patient has soap  instructed on use of chlorhexidine soap per hospital protocol    Patient instructed to stop all ASA, NSAIDS, vitamins and herbal supplements today for surgery 01/18/2018

## 2019-01-16 ENCOUNTER — ANESTHESIA EVENT (OUTPATIENT)
Dept: PERIOP | Facility: HOSPITAL | Age: 59
End: 2019-01-16
Payer: COMMERCIAL

## 2019-01-18 ENCOUNTER — HOSPITAL ENCOUNTER (OUTPATIENT)
Dept: RADIOLOGY | Facility: HOSPITAL | Age: 59
Setting detail: OUTPATIENT SURGERY
Discharge: HOME | End: 2019-01-18
Payer: COMMERCIAL

## 2019-01-18 ENCOUNTER — HOSPITAL ENCOUNTER (OUTPATIENT)
Facility: HOSPITAL | Age: 59
Setting detail: OUTPATIENT SURGERY
Discharge: HOME | End: 2019-01-18
Attending: PODIATRIST | Admitting: PODIATRIST
Payer: COMMERCIAL

## 2019-01-18 ENCOUNTER — ANESTHESIA (OUTPATIENT)
Dept: PERIOP | Facility: HOSPITAL | Age: 59
End: 2019-01-18
Payer: COMMERCIAL

## 2019-01-18 ENCOUNTER — APPOINTMENT (OUTPATIENT)
Dept: RADIOLOGY | Facility: HOSPITAL | Age: 59
End: 2019-01-18
Payer: COMMERCIAL

## 2019-01-18 VITALS
HEART RATE: 79 BPM | DIASTOLIC BLOOD PRESSURE: 72 MMHG | OXYGEN SATURATION: 97 % | WEIGHT: 190 LBS | RESPIRATION RATE: 16 BRPM | BODY MASS INDEX: 32.44 KG/M2 | SYSTOLIC BLOOD PRESSURE: 134 MMHG | TEMPERATURE: 98.1 F | HEIGHT: 64 IN

## 2019-01-18 DIAGNOSIS — M20.22 HALLUX RIGIDUS OF LEFT FOOT: ICD-10-CM

## 2019-01-18 DIAGNOSIS — Z98.890 S/P FOOT SURGERY, LEFT: Primary | ICD-10-CM

## 2019-01-18 PROCEDURE — C1713 ANCHOR/SCREW BN/BN,TIS/BN: HCPCS | Performed by: PODIATRIST

## 2019-01-18 PROCEDURE — 73660 X-RAY EXAM OF TOE(S): CPT

## 2019-01-18 PROCEDURE — 73630 X-RAY EXAM OF FOOT: CPT

## 2019-01-18 DEVICE — POLYAXIAL LOCKING PLATE -  MTP CROSS-PLATE, LEFT (T8)
Type: IMPLANTABLE DEVICE | Site: FOOT | Status: FUNCTIONAL
Brand: ANCHORAGE

## 2019-01-18 DEVICE — LOCKING SCREW, FULLY THREADED,T8
Type: IMPLANTABLE DEVICE | Site: FOOT | Status: FUNCTIONAL
Brand: VARIAX

## 2019-01-18 DEVICE — HOLDING PIN
Type: IMPLANTABLE DEVICE | Site: FOOT | Status: FUNCTIONAL
Brand: ANCHORAGE

## 2019-01-18 DEVICE — CP LAG SCREW (T8)
Type: IMPLANTABLE DEVICE | Site: FOOT | Status: FUNCTIONAL
Brand: ANCHORAGE

## 2019-01-18 RX ORDER — PROPOFOL 10 MG/ML
INJECTION, EMULSION INTRAVENOUS AS NEEDED
Status: DISCONTINUED | OUTPATIENT
Start: 2019-01-18 | End: 2019-01-18 | Stop reason: SURG

## 2019-01-18 RX ORDER — ASPIRIN 325 MG
325 TABLET ORAL 2 TIMES DAILY
Qty: 60 TABLET | Refills: 0 | Status: SHIPPED | OUTPATIENT
Start: 2019-01-18 | End: 2019-04-24 | Stop reason: ALTCHOICE

## 2019-01-18 RX ORDER — FENTANYL CITRATE 50 UG/ML
INJECTION, SOLUTION INTRAMUSCULAR; INTRAVENOUS AS NEEDED
Status: DISCONTINUED | OUTPATIENT
Start: 2019-01-18 | End: 2019-01-18 | Stop reason: SURG

## 2019-01-18 RX ORDER — MAGNESIUM HYDROXIDE 1200 MG/15ML
LIQUID ORAL AS NEEDED
Status: DISCONTINUED | OUTPATIENT
Start: 2019-01-18 | End: 2019-01-18 | Stop reason: HOSPADM

## 2019-01-18 RX ORDER — EPHEDRINE SULFATE 50 MG/ML
INJECTION, SOLUTION INTRAVENOUS AS NEEDED
Status: DISCONTINUED | OUTPATIENT
Start: 2019-01-18 | End: 2019-01-18 | Stop reason: SURG

## 2019-01-18 RX ORDER — ONDANSETRON 2 MG/ML
4 INJECTION INTRAMUSCULAR; INTRAVENOUS ONCE AS NEEDED
Status: DISCONTINUED | OUTPATIENT
Start: 2019-01-18 | End: 2019-01-18 | Stop reason: HOSPADM

## 2019-01-18 RX ORDER — ONDANSETRON 2 MG/ML
INJECTION INTRAMUSCULAR; INTRAVENOUS AS NEEDED
Status: DISCONTINUED | OUTPATIENT
Start: 2019-01-18 | End: 2019-01-18 | Stop reason: SURG

## 2019-01-18 RX ORDER — SCOLOPAMINE TRANSDERMAL SYSTEM 1 MG/1
1 PATCH, EXTENDED RELEASE TRANSDERMAL ONCE AS NEEDED
Status: DISCONTINUED | OUTPATIENT
Start: 2019-01-18 | End: 2019-01-18 | Stop reason: HOSPADM

## 2019-01-18 RX ORDER — CEFAZOLIN SODIUM 2 G/50ML
2000 SOLUTION INTRAVENOUS
Status: DISCONTINUED | OUTPATIENT
Start: 2019-01-18 | End: 2019-01-18 | Stop reason: HOSPADM

## 2019-01-18 RX ORDER — OXYCODONE HYDROCHLORIDE AND ACETAMINOPHEN 5; 325 MG/1; MG/1
1 TABLET ORAL EVERY 4 HOURS PRN
Status: DISCONTINUED | OUTPATIENT
Start: 2019-01-18 | End: 2019-01-18 | Stop reason: HOSPADM

## 2019-01-18 RX ORDER — MIDAZOLAM HYDROCHLORIDE 1 MG/ML
INJECTION INTRAMUSCULAR; INTRAVENOUS AS NEEDED
Status: DISCONTINUED | OUTPATIENT
Start: 2019-01-18 | End: 2019-01-18 | Stop reason: SURG

## 2019-01-18 RX ORDER — SODIUM CHLORIDE 9 MG/ML
125 INJECTION, SOLUTION INTRAVENOUS CONTINUOUS
Status: DISCONTINUED | OUTPATIENT
Start: 2019-01-18 | End: 2019-01-18 | Stop reason: HOSPADM

## 2019-01-18 RX ORDER — DEXAMETHASONE SODIUM PHOSPHATE 4 MG/ML
INJECTION, SOLUTION INTRA-ARTICULAR; INTRALESIONAL; INTRAMUSCULAR; INTRAVENOUS; SOFT TISSUE AS NEEDED
Status: DISCONTINUED | OUTPATIENT
Start: 2019-01-18 | End: 2019-01-18 | Stop reason: SURG

## 2019-01-18 RX ORDER — FENTANYL CITRATE/PF 50 MCG/ML
25 SYRINGE (ML) INJECTION
Status: DISCONTINUED | OUTPATIENT
Start: 2019-01-18 | End: 2019-01-18 | Stop reason: HOSPADM

## 2019-01-18 RX ORDER — OXYCODONE HYDROCHLORIDE AND ACETAMINOPHEN 5; 325 MG/1; MG/1
1 TABLET ORAL EVERY 4 HOURS PRN
Qty: 35 TABLET | Refills: 0 | Status: SHIPPED | OUTPATIENT
Start: 2019-01-18 | End: 2019-01-28

## 2019-01-18 RX ADMIN — CEFAZOLIN SODIUM 2000 MG: 2 SOLUTION INTRAVENOUS at 14:12

## 2019-01-18 RX ADMIN — DEXAMETHASONE SODIUM PHOSPHATE 4 MG: 4 INJECTION, SOLUTION INTRAMUSCULAR; INTRAVENOUS at 14:30

## 2019-01-18 RX ADMIN — SCOPALAMINE 1 PATCH: 1 PATCH, EXTENDED RELEASE TRANSDERMAL at 12:31

## 2019-01-18 RX ADMIN — SODIUM CHLORIDE 125 ML/HR: 0.9 INJECTION, SOLUTION INTRAVENOUS at 11:37

## 2019-01-18 RX ADMIN — FENTANYL CITRATE 50 MCG: 50 INJECTION, SOLUTION INTRAMUSCULAR; INTRAVENOUS at 15:31

## 2019-01-18 RX ADMIN — EPHEDRINE SULFATE 10 MG: 50 INJECTION, SOLUTION INTRAMUSCULAR; INTRAVENOUS; SUBCUTANEOUS at 15:13

## 2019-01-18 RX ADMIN — LIDOCAINE HYDROCHLORIDE 60 MG: 20 INJECTION, SOLUTION INTRAVENOUS at 14:15

## 2019-01-18 RX ADMIN — EPHEDRINE SULFATE 5 MG: 50 INJECTION, SOLUTION INTRAMUSCULAR; INTRAVENOUS; SUBCUTANEOUS at 14:42

## 2019-01-18 RX ADMIN — SODIUM CHLORIDE 125 ML/HR: 0.9 INJECTION, SOLUTION INTRAVENOUS at 15:59

## 2019-01-18 RX ADMIN — ONDANSETRON 4 MG: 2 INJECTION INTRAMUSCULAR; INTRAVENOUS at 14:11

## 2019-01-18 RX ADMIN — PROPOFOL 200 MG: 10 INJECTION, EMULSION INTRAVENOUS at 14:15

## 2019-01-18 RX ADMIN — FENTANYL CITRATE 50 MCG: 50 INJECTION, SOLUTION INTRAMUSCULAR; INTRAVENOUS at 14:18

## 2019-01-18 RX ADMIN — MIDAZOLAM 2 MG: 1 INJECTION INTRAMUSCULAR; INTRAVENOUS at 14:11

## 2019-01-18 NOTE — ANESTHESIA PREPROCEDURE EVALUATION
Review of Systems/Medical History  Patient summary reviewed  Chart reviewed  History of anesthetic complications PONV    Cardiovascular  Hyperlipidemia,    Pulmonary       GI/Hepatic    GERD well controlled,        Negative  ROS        Endo/Other  Negative endo/other ROS      GYN  Negative gynecology ROS          Hematology  Negative hematology ROS      Musculoskeletal    Arthritis     Neurology  Negative neurology ROS      Psychology   Depression ,              Physical Exam    Airway    Mallampati score: III  TM Distance: >3 FB  Neck ROM: full     Dental   No notable dental hx     Cardiovascular  Rhythm: regular, Rate: normal, Cardiovascular exam normal    Pulmonary  Pulmonary exam normal     Other Findings        Anesthesia Plan  ASA Score- 2     Anesthesia Type- general and IV sedation with anesthesia with ASA Monitors  Additional Monitors:   Airway Plan: LMA  Plan Factors- Patient instructed to abstain from smoking on day of procedure  Patient did not smoke on day of surgery  Induction- intravenous  Postoperative Plan- Plan for postoperative opioid use  Informed Consent- Anesthetic plan and risks discussed with patient and spouse

## 2019-01-18 NOTE — OP NOTE
OPERATIVE REPORT  PATIENT NAME: Howard Cooper    :  1960  MRN: [id number]  Pt Location: [facility] OR ROOM 05    SURGERY DATE: 2019    Surgeon(s) and Role:     * Emma Singh, SREEDHAR - Primary     * Dajuan Cota DPM - Assisting- first      * Virginia Soto, SREEDHAR - Assisting-second     Preop Diagnosis:  Hallux rigidus of left foot [M20 22]    Post-Op Diagnosis Codes:     * Hallux rigidus of left foot [M20 22]    Procedure(s) (LRB):  GREAT TOE FUSION WITH PLATE (Left)    Specimen(s):  * No specimens in log *    Estimated Blood Loss:   Minimal    Drains: none    Anesthesia Type:   General with 15 cc of one-to-one mixture of 1% lidocaine and 0 5% Marcaine plain    Materials:   [place] anchorage plate (left)   [place] locking screws 2 7 x14mm x2, x18mm x2  [place] non locking screw 3 6 x30mm   2-0 vicryl, 3-0 vicryl, 4-0 nylon     Hemostasis:   Pneumatic ankle tourniquet at 200 mm of mercury for 60 minutes    Operative Indications:  Hallux rigidus of left foot [M20 22]    Operative Findings:  As expected with diagnosis  Complications:   None    Procedure and Technique:  Under mild sedation, the patient was brought into the operating room and placed on the operating room table in the supine position  A pneumatic ankle tourniquet was then placed around the patient's left ankle with ample webril padding  A time out was performed to confirm the correct patient, procedure and site with all parties in agreement  Following IV sedation, local anesthetic was obtained about the patient's left foot and injection was performed consisting of 16 ml of 1% Lidocaine and 0 5% Bupivacaine in a 1:1 mixture  The foot was then scrubbed, prepped and draped in the usual aseptic manner  An esmarch bandage was utilized to exsangunate the patients foot and the pneumatic ankle tourniquet was then inflated  The esmarch bandage was removed and the foot was placed on the operating room table      Attention was then directed to dorsomedial aspect of the Left foot where an approximately 6cm dorsolinear incision was made  This incision was deepened to subcutaneous tissue with a sterile 15 blade  All vital neurovascular structures were identified and then retracted, all bleeders were identified and cauterized  The EHL was then identified and retracted laterally  A capsular incision was then made and all capsular and periosteal structures were reflected off of the 1st metatarsal head and the base of the proximal phalanx, all soft tissue attachments were reflected off the 1st metatarsal head and base of the proximal phalanx  Upon visualization of the MTPJ, it was noted that there was cartilage destruction to the metatarsal head and base of the proximal phalanx and prominent osteophyte formation circumferentially around the joint  A cup and cone reamer was then utilized to remove all cartilaginous surfaces from the head of the first metatarsal and the base of the proximal phalanx down to bleeding subchondral bone in the standard fashion  Subchondral drilling was performed with k-wire  Adequate reduction and position of the joint was visualized and assured using C-arm  Temporary K-wire was placed then a faye plate was placed on the dorsal aspect of the 1st MTPJ and fixated with locking screws and lag screw per 's protocol  Compression across the joint was visualized clinically and under c-arm  The wound was cleansed with copious amounts of sterile saline  Capsular closure was then obtained utilizing 2-0 Vicryl  Subcutaneous tissues were then reapproximated utilizing 3-0 Vicryl  Skin closure was then obtained utilizing 4-0 nylon placed in a horizontal mattress type of fashion  Surgical site was dressed with adaptic, 4x4 gauze, kerlex, then posterior splint applied with ample webril padding  Tourniquet was deflated at this time and normal hyperemic response was noted to the digits    Patient tolerated the procedure and anesthesia well and was transported to the PACU with vital signs stable      Patient Disposition:  PACU  and hemodynamically stable    SIGNATURE: Danyelle Schuster DPM  DATE: January 18, 2019  TIME: 3:32 PM

## 2019-01-18 NOTE — DISCHARGE INSTRUCTIONS
Dr Ely Iglesias, DPM  Post-op surgery Instructions    Pain / Swelling   There is expected to be some discomfort, swelling and bruising of the foot  You might see some blood on the bandage  This is not a cause for alarm  However, if there is active or persistent bleeding (blood running out of the bandage while at rest) - call the office at once (or) go to a [address] [place] [address] and ask them to page the podiatry residents   Apply an ice bag to the top of your ankle for 30 minutes for each waking hour, for the first 72 hours  This should be discontinued when sleeping  This will also work through your cast if you have one  Ice must not leak and wet the dressings  Also, using the ice inappropriately can cause permanent nerve damage   Your foot should be elevated as much as possible for the first 72 hours  The foot should be above heart level  If your foot is below heart level, throbbing and pain will increase  When sleeping, elevation can be accomplished by putting a small hard suitcase between the box spring and mattress at the foot of the bed  Walking and standing will increase pain, throbbing and bleeding   Persistent pain despite elevation and your pain meds can many times be relieved by removing the tight brown compression layer (called the ACE wrap) that is over the white gauze dressing  If you are elevating and taking your pain meds and pain is still severe, remove this brown stretchy layer but leave the gauze intact  Wait 30 minutes  If the pain subsides, reapply the ACE so its not so tight  If pain doesnt get better, call your doctor  Dressings / Casts   Do not remove your surgical dressings - they will be changed at your doctor appointment  Do not allow surgical dressings to get wet  Sponge baths should be used until the sutures are removed  Do not try to keep the foot dry using a garbage bag and tape - this rarely works    If you get your dressings or cast wet - call your doctor immediately   If your cast or dressings feel tight - elevate your foot for 30 minutes  If this doesnt help and you feel tingling or see toe discoloration - call your doctor or go to a [facility] ER and ask them to page the podiatry residents   Do not put things in your cast such as powder, coat hangers to scratch, etc  This can cause skin damage and infections  Infection   If you have a fever at or above 100 degrees, chills, sweats, or see red streaks rising above the dressing or smell odor / see pus (creamy white drainage), call your doctor immediately or go to a [facility] ER and ask them to page the podiatry residents  Constipation   If you have severe constipation after surgery, this can be due to the pain medication  Notify your doctor and special medication will be prescribed to deal with this  Blood Clots   If you had surgery and are in a cast, have an external fixation device, or are non-weightbearing using crutches, a knee scooter, a wheelchair, a walker, or an iWalk device - you need to be on a blood thinner  Your doctor will prescribe one of the regimens below  If you run out of the blood thinner checked off below before you are walking normally on your foot and out of your cast - notify your doctor immediately so you can get a refill  Not doing so can lead to blood clots and serious complications including death  Aspirin 325mg twice per day    Numbness   It is normal for your foot to be numb until about dinner time  If youve had a popliteal block procedure, you might be numb until the following day  When you start to feel pins and needles in the foot - this means the block is wearing off  That is the appropriate time to take your pain medication  Pain Medication   Do not supplement your pain medication with over the counter drugs, old leftover pain medications, or extra Tylenol   You must discuss any additional medications with your doctor prior to taking them for pain    Driving   No driving is allowed without discussion with the doctor  Ambulation   Nonweightbearing to surgical foot   If given a flat, stiff shoe / darco wedge shoe, Do not walk at all without it   Use a device (cane, walker, crutches) to take some weight off of the foot when walking   If instructed not to put weight on the surgical foot, use the following:  o Crutches and Knee scooter     o Putting weight on the foot will lead to complications

## 2019-01-18 NOTE — ANESTHESIA POSTPROCEDURE EVALUATION
Post-Op Assessment Note      CV Status:  Stable    Mental Status:  Alert and awake    Hydration Status:  Euvolemic    PONV Controlled:  Controlled    Airway Patency:  Patent    Post Op Vitals Reviewed: Yes          Staff: Anesthesiologist           /65 (01/18/19 1541)    Temp 98 1 °F (36 7 °C) (01/18/19 1541)    Pulse 84 (01/18/19 1541)   Resp 12 (01/18/19 1541)    SpO2 97 % (01/18/19 1541)

## 2019-01-18 NOTE — DISCHARGE SUMMARY
Discharge Summary Outpatient Procedure Podiatry -   Hoda Loja 66 y o  female MRN: [id number]  Unit/Bed#: OR POOL Encounter: [id number]    Admission Date: 1/18/2019     Admitting Diagnosis: Hallux rigidus of left foot [M20 22]    Discharge Diagnosis: same    Procedures Performed: GREAT TOE FUSION WITH PLATE: 93876 (CPT®) left foot    Complications: none    Condition at Discharge: stable    Discharge instructions/Information to patient and family:   See after visit summary for information provided to patient and family  Provisions for Follow-Up Care/Important appointments:  See after visit summary for information related to follow-up care and any pertinent home health orders  Discharge Medications:  See after visit summary for reconciled discharge medications provided to patient and family

## 2019-03-07 ENCOUNTER — APPOINTMENT (OUTPATIENT)
Dept: RADIOLOGY | Facility: CLINIC | Age: 59
End: 2019-03-07
Payer: COMMERCIAL

## 2019-03-07 ENCOUNTER — TRANSCRIBE ORDERS (OUTPATIENT)
Dept: ADMINISTRATIVE | Facility: HOSPITAL | Age: 59
End: 2019-03-07

## 2019-03-07 DIAGNOSIS — M20.22 HALLUX RIGIDUS OF LEFT FOOT: Primary | ICD-10-CM

## 2019-03-07 DIAGNOSIS — M20.22 HALLUX RIGIDUS OF LEFT FOOT: ICD-10-CM

## 2019-03-07 PROCEDURE — 73630 X-RAY EXAM OF FOOT: CPT

## 2019-04-24 ENCOUNTER — OFFICE VISIT (OUTPATIENT)
Dept: FAMILY MEDICINE CLINIC | Facility: CLINIC | Age: 59
End: 2019-04-24
Payer: COMMERCIAL

## 2019-04-24 VITALS
WEIGHT: 195.2 LBS | SYSTOLIC BLOOD PRESSURE: 118 MMHG | HEART RATE: 73 BPM | DIASTOLIC BLOOD PRESSURE: 74 MMHG | TEMPERATURE: 97.7 F | BODY MASS INDEX: 33.32 KG/M2 | HEIGHT: 64 IN

## 2019-04-24 DIAGNOSIS — M54.5 ACUTE LOW BACK PAIN, UNSPECIFIED BACK PAIN LATERALITY, WITH SCIATICA PRESENCE UNSPECIFIED: ICD-10-CM

## 2019-04-24 DIAGNOSIS — Z79.899 HIGH RISK MEDICATION USE: ICD-10-CM

## 2019-04-24 DIAGNOSIS — Z13.6 SCREENING FOR CARDIOVASCULAR CONDITION: ICD-10-CM

## 2019-04-24 DIAGNOSIS — Z11.59 ENCOUNTER FOR HEPATITIS C SCREENING TEST FOR LOW RISK PATIENT: ICD-10-CM

## 2019-04-24 DIAGNOSIS — Z00.00 ENCOUNTER FOR WELL ADULT EXAM WITHOUT ABNORMAL FINDINGS: Primary | ICD-10-CM

## 2019-04-24 DIAGNOSIS — E78.00 PURE HYPERCHOLESTEROLEMIA: ICD-10-CM

## 2019-04-24 DIAGNOSIS — Z12.11 SCREEN FOR COLON CANCER: ICD-10-CM

## 2019-04-24 PROCEDURE — 99396 PREV VISIT EST AGE 40-64: CPT | Performed by: FAMILY MEDICINE

## 2019-05-02 ENCOUNTER — EVALUATION (OUTPATIENT)
Dept: PHYSICAL THERAPY | Facility: CLINIC | Age: 59
End: 2019-05-02
Payer: COMMERCIAL

## 2019-05-02 DIAGNOSIS — M54.50 LUMBAR PAIN: Primary | ICD-10-CM

## 2019-05-02 PROCEDURE — 97161 PT EVAL LOW COMPLEX 20 MIN: CPT | Performed by: PHYSICAL THERAPIST

## 2019-05-02 PROCEDURE — 97110 THERAPEUTIC EXERCISES: CPT | Performed by: PHYSICAL THERAPIST

## 2019-05-08 ENCOUNTER — OFFICE VISIT (OUTPATIENT)
Dept: PHYSICAL THERAPY | Facility: CLINIC | Age: 59
End: 2019-05-08
Payer: COMMERCIAL

## 2019-05-08 DIAGNOSIS — M54.50 LUMBAR PAIN: Primary | ICD-10-CM

## 2019-05-08 PROCEDURE — 97140 MANUAL THERAPY 1/> REGIONS: CPT | Performed by: PHYSICAL THERAPIST

## 2019-05-08 PROCEDURE — 97112 NEUROMUSCULAR REEDUCATION: CPT | Performed by: PHYSICAL THERAPIST

## 2019-05-08 PROCEDURE — 97110 THERAPEUTIC EXERCISES: CPT | Performed by: PHYSICAL THERAPIST

## 2019-05-15 ENCOUNTER — OFFICE VISIT (OUTPATIENT)
Dept: PHYSICAL THERAPY | Facility: CLINIC | Age: 59
End: 2019-05-15
Payer: COMMERCIAL

## 2019-05-15 DIAGNOSIS — M54.50 LUMBAR PAIN: Primary | ICD-10-CM

## 2019-05-15 PROCEDURE — 97140 MANUAL THERAPY 1/> REGIONS: CPT

## 2019-05-15 PROCEDURE — 97110 THERAPEUTIC EXERCISES: CPT

## 2019-05-15 PROCEDURE — 97112 NEUROMUSCULAR REEDUCATION: CPT

## 2019-05-21 LAB
ALBUMIN SERPL-MCNC: 4.8 G/DL (ref 3.5–5.5)
ALBUMIN/GLOB SERPL: 2.2 {RATIO} (ref 1.2–2.2)
ALP SERPL-CCNC: 83 IU/L (ref 39–117)
ALT SERPL-CCNC: 25 IU/L (ref 0–32)
APPEARANCE UR: CLEAR
AST SERPL-CCNC: 22 IU/L (ref 0–40)
BACTERIA URNS QL MICRO: ABNORMAL
BILIRUB SERPL-MCNC: 0.5 MG/DL (ref 0–1.2)
BILIRUB UR QL STRIP: NEGATIVE
BUN SERPL-MCNC: 23 MG/DL (ref 6–24)
BUN/CREAT SERPL: 23 (ref 9–23)
CALCIUM SERPL-MCNC: 10.2 MG/DL (ref 8.7–10.2)
CHLORIDE SERPL-SCNC: 107 MMOL/L (ref 96–106)
CHOLEST SERPL-MCNC: 193 MG/DL (ref 100–199)
CHOLEST/HDLC SERPL: 3.4 RATIO (ref 0–4.4)
CK SERPL-CCNC: 133 U/L (ref 24–173)
CO2 SERPL-SCNC: 22 MMOL/L (ref 20–29)
COLOR UR: YELLOW
CREAT SERPL-MCNC: 1.02 MG/DL (ref 0.57–1)
EPI CELLS #/AREA URNS HPF: ABNORMAL /HPF (ref 0–10)
GLOBULIN SER-MCNC: 2.2 G/DL (ref 1.5–4.5)
GLUCOSE SERPL-MCNC: 108 MG/DL (ref 65–99)
GLUCOSE UR QL: NEGATIVE
HCV AB S/CO SERPL IA: <0.1 S/CO RATIO (ref 0–0.9)
HDLC SERPL-MCNC: 57 MG/DL
HGB UR QL STRIP: NEGATIVE
KETONES UR QL STRIP: NEGATIVE
LDLC SERPL CALC-MCNC: 119 MG/DL (ref 0–99)
LEUKOCYTE ESTERASE UR QL STRIP: ABNORMAL
MICRO URNS: ABNORMAL
MUCOUS THREADS URNS QL MICRO: PRESENT
NITRITE UR QL STRIP: NEGATIVE
PH UR STRIP: 7.5 [PH] (ref 5–7.5)
POTASSIUM SERPL-SCNC: 4.6 MMOL/L (ref 3.5–5.2)
PROT SERPL-MCNC: 7 G/DL (ref 6–8.5)
PROT UR QL STRIP: NEGATIVE
RBC #/AREA URNS HPF: ABNORMAL /HPF (ref 0–2)
SL AMB EGFR AFRICAN AMERICAN: 71 ML/MIN/1.73
SL AMB EGFR NON AFRICAN AMERICAN: 61 ML/MIN/1.73
SL AMB VLDL CHOLESTEROL CALC: 17 MG/DL (ref 5–40)
SODIUM SERPL-SCNC: 142 MMOL/L (ref 134–144)
SP GR UR: 1.02 (ref 1–1.03)
TRIGL SERPL-MCNC: 83 MG/DL (ref 0–149)
UROBILINOGEN UR STRIP-ACNC: 0.2 EU/DL (ref 0.2–1)
WBC #/AREA URNS HPF: ABNORMAL /HPF (ref 0–5)

## 2019-05-23 ENCOUNTER — OFFICE VISIT (OUTPATIENT)
Dept: PHYSICAL THERAPY | Facility: CLINIC | Age: 59
End: 2019-05-23
Payer: COMMERCIAL

## 2019-05-23 DIAGNOSIS — M54.50 LUMBAR PAIN: Primary | ICD-10-CM

## 2019-05-23 PROCEDURE — 97110 THERAPEUTIC EXERCISES: CPT | Performed by: PHYSICAL THERAPIST

## 2019-05-23 PROCEDURE — 97140 MANUAL THERAPY 1/> REGIONS: CPT | Performed by: PHYSICAL THERAPIST

## 2019-05-23 PROCEDURE — 97112 NEUROMUSCULAR REEDUCATION: CPT | Performed by: PHYSICAL THERAPIST

## 2019-05-30 ENCOUNTER — APPOINTMENT (OUTPATIENT)
Dept: PHYSICAL THERAPY | Facility: CLINIC | Age: 59
End: 2019-05-30
Payer: COMMERCIAL

## 2019-06-06 ENCOUNTER — OFFICE VISIT (OUTPATIENT)
Dept: PHYSICAL THERAPY | Facility: CLINIC | Age: 59
End: 2019-06-06
Payer: COMMERCIAL

## 2019-06-06 DIAGNOSIS — M54.50 LUMBAR PAIN: Primary | ICD-10-CM

## 2019-06-06 PROCEDURE — 97112 NEUROMUSCULAR REEDUCATION: CPT

## 2019-06-06 PROCEDURE — 97110 THERAPEUTIC EXERCISES: CPT

## 2019-06-13 ENCOUNTER — OFFICE VISIT (OUTPATIENT)
Dept: PHYSICAL THERAPY | Facility: CLINIC | Age: 59
End: 2019-06-13
Payer: COMMERCIAL

## 2019-06-13 DIAGNOSIS — M54.50 LUMBAR PAIN: Primary | ICD-10-CM

## 2019-06-13 PROCEDURE — 97110 THERAPEUTIC EXERCISES: CPT | Performed by: PHYSICAL THERAPIST

## 2019-06-13 PROCEDURE — 97112 NEUROMUSCULAR REEDUCATION: CPT | Performed by: PHYSICAL THERAPIST

## 2019-06-27 ENCOUNTER — OFFICE VISIT (OUTPATIENT)
Dept: PHYSICAL THERAPY | Facility: CLINIC | Age: 59
End: 2019-06-27
Payer: COMMERCIAL

## 2019-06-27 DIAGNOSIS — M54.50 LUMBAR PAIN: Primary | ICD-10-CM

## 2019-06-27 PROCEDURE — 97112 NEUROMUSCULAR REEDUCATION: CPT

## 2019-07-11 ENCOUNTER — TRANSCRIBE ORDERS (OUTPATIENT)
Dept: ADMINISTRATIVE | Facility: HOSPITAL | Age: 59
End: 2019-07-11

## 2019-07-11 DIAGNOSIS — Z12.39 BREAST SCREENING, UNSPECIFIED: Primary | ICD-10-CM

## 2019-07-16 ENCOUNTER — HOSPITAL ENCOUNTER (OUTPATIENT)
Dept: MAMMOGRAPHY | Facility: CLINIC | Age: 59
Discharge: HOME | End: 2019-07-16
Payer: COMMERCIAL

## 2019-07-16 DIAGNOSIS — Z12.39 BREAST SCREENING, UNSPECIFIED: ICD-10-CM

## 2019-07-16 PROCEDURE — 77063 BREAST TOMOSYNTHESIS BI: CPT

## 2019-07-16 PROCEDURE — 77067 SCR MAMMO BI INCL CAD: CPT

## 2019-10-15 ENCOUNTER — CLINICAL SUPPORT (OUTPATIENT)
Dept: GASTROENTEROLOGY | Facility: CLINIC | Age: 59
End: 2019-10-15

## 2019-10-15 VITALS — BODY MASS INDEX: 33.97 KG/M2 | HEIGHT: 64 IN | WEIGHT: 199 LBS

## 2019-10-15 DIAGNOSIS — Z86.010 HISTORY OF COLON POLYPS: Primary | ICD-10-CM

## 2019-10-15 NOTE — PROGRESS NOTES
Pt seen for colon prep dx history of polyps  Meds reviewed with pt  Instructions given  Screening form signed  Suprep rx sent to pharmacy   Colon bmec 10/22/19 KK

## 2019-10-17 ENCOUNTER — TELEPHONE (OUTPATIENT)
Dept: FAMILY MEDICINE CLINIC | Facility: CLINIC | Age: 59
End: 2019-10-17

## 2019-10-17 DIAGNOSIS — Z86.010 PERSONAL HISTORY OF COLONIC POLYPS: Primary | ICD-10-CM

## 2019-10-17 NOTE — TELEPHONE ENCOUNTER
Referral Colonoscopy  [facility]  NPI [id number]  DX [id number]  Procedure   10/22/19  Dacia Quintana

## 2019-11-05 ENCOUNTER — TELEPHONE (OUTPATIENT)
Dept: FAMILY MEDICINE CLINIC | Facility: CLINIC | Age: 59
End: 2019-11-05

## 2019-11-05 DIAGNOSIS — H43.393 VITREOUS FLOATER, BILATERAL: Primary | ICD-10-CM

## 2019-11-05 NOTE — TELEPHONE ENCOUNTER
[place]  Doctor: Dr Jhaveri Doing  Phone #:[phone]  YTX:[id number]  DX Code: [id number]  Procedure Code:  Appt Date: 11/11/19  Insurance: [employer] [place]

## 2019-11-25 DIAGNOSIS — E78.5 HYPERLIPIDEMIA, UNSPECIFIED HYPERLIPIDEMIA TYPE: ICD-10-CM

## 2019-11-25 RX ORDER — ATORVASTATIN CALCIUM 40 MG/1
40 TABLET, FILM COATED ORAL
Qty: 90 TABLET | Refills: 1 | Status: SHIPPED | OUTPATIENT
Start: 2019-11-25 | End: 2020-07-14 | Stop reason: SDUPTHER

## 2020-01-30 ENCOUNTER — OFFICE VISIT (OUTPATIENT)
Dept: FAMILY MEDICINE CLINIC | Facility: CLINIC | Age: 60
End: 2020-01-30
Payer: COMMERCIAL

## 2020-01-30 VITALS
WEIGHT: 195 LBS | SYSTOLIC BLOOD PRESSURE: 122 MMHG | HEART RATE: 72 BPM | DIASTOLIC BLOOD PRESSURE: 80 MMHG | HEIGHT: 64 IN | TEMPERATURE: 99.1 F | RESPIRATION RATE: 18 BRPM | BODY MASS INDEX: 33.29 KG/M2

## 2020-01-30 DIAGNOSIS — R05.9 COUGH: ICD-10-CM

## 2020-01-30 DIAGNOSIS — R06.2 WHEEZING: ICD-10-CM

## 2020-01-30 DIAGNOSIS — J01.00 ACUTE NON-RECURRENT MAXILLARY SINUSITIS: Primary | ICD-10-CM

## 2020-01-30 PROCEDURE — 99214 OFFICE O/P EST MOD 30 MIN: CPT | Performed by: NURSE PRACTITIONER

## 2020-01-30 PROCEDURE — 3008F BODY MASS INDEX DOCD: CPT | Performed by: NURSE PRACTITIONER

## 2020-01-30 PROCEDURE — 1036F TOBACCO NON-USER: CPT | Performed by: NURSE PRACTITIONER

## 2020-01-30 RX ORDER — AMOXICILLIN AND CLAVULANATE POTASSIUM 875; 125 MG/1; MG/1
1 TABLET, FILM COATED ORAL EVERY 12 HOURS SCHEDULED
Qty: 14 TABLET | Refills: 0 | Status: SHIPPED | OUTPATIENT
Start: 2020-01-30 | End: 2020-02-06

## 2020-01-30 RX ORDER — BENZONATATE 100 MG/1
100 CAPSULE ORAL 3 TIMES DAILY PRN
Qty: 20 CAPSULE | Refills: 0 | Status: SHIPPED | OUTPATIENT
Start: 2020-01-30 | End: 2020-08-31 | Stop reason: ALTCHOICE

## 2020-01-30 RX ORDER — ALBUTEROL SULFATE 90 UG/1
2 AEROSOL, METERED RESPIRATORY (INHALATION) EVERY 6 HOURS PRN
Qty: 1 INHALER | Refills: 0 | Status: SHIPPED | OUTPATIENT
Start: 2020-01-30 | End: 2020-02-29

## 2020-01-30 NOTE — PROGRESS NOTES
Assessment/Plan   Problem List Items Addressed This Visit     None      Visit Diagnoses     Acute non-recurrent maxillary sinusitis    -  Primary    Relevant Medications    amoxicillin-clavulanate (AUGMENTIN) 875-125 mg per tablet    Cough        Relevant Medications    benzonatate (TESSALON PERLES) 100 mg capsule    Wheezing        Relevant Medications    albuterol (PROVENTIL HFA,VENTOLIN HFA) 90 mcg/act inhaler                Chief Complaint   Patient presents with    Cold Like Symptoms     x 1 week    Cough       Subjective   Patient ID: Konstantin Martinez is a 64 y o  female  Vitals:    01/30/20 1006   BP: 122/80   Pulse: 72   Resp: 18   Temp: 99 1 °F (37 3 °C)     Cough   This is a new problem  The current episode started in the past 7 days  The problem has been gradually worsening  The cough is non-productive  Associated symptoms include chills, ear congestion (left side), a fever, myalgias, nasal congestion, postnasal drip and wheezing  Pertinent negatives include no chest pain, sore throat or shortness of breath  The symptoms are aggravated by lying down  Risk factors: none  She has tried rest for the symptoms  The treatment provided no relief  Her past medical history is significant for environmental allergies  The following portions of the patient's history were reviewed and updated as appropriate: allergies, current medications, past family history, past medical history, past surgical history and problem list     Review of Systems   Constitutional: Positive for chills and fever  HENT: Positive for postnasal drip  Negative for sore throat  Eyes: Negative  Respiratory: Positive for cough and wheezing  Negative for shortness of breath  Cardiovascular: Negative  Negative for chest pain  Gastrointestinal: Negative  Endocrine: Negative  Genitourinary: Negative  Musculoskeletal: Positive for myalgias  Skin: Negative  Allergic/Immunologic: Positive for environmental allergies  Neurological: Negative  Hematological: Negative  Psychiatric/Behavioral: Negative  Negative for agitation  Objective     Physical Exam   Constitutional: She is oriented to person, place, and time  She appears well-developed and well-nourished  No distress (appears ill)  HENT:   Head: Normocephalic and atraumatic  Right Ear: External ear normal  Tympanic membrane is not erythematous and not bulging  No middle ear effusion  Left Ear: External ear normal  Tympanic membrane is not erythematous and not bulging  A middle ear effusion is present  Nose: Mucosal edema and sinus tenderness present  Right sinus exhibits maxillary sinus tenderness  Left sinus exhibits maxillary sinus tenderness  Mouth/Throat: Uvula is midline, oropharynx is clear and moist and mucous membranes are normal  Tonsils are 1+ on the right  Tonsils are 1+ on the left  No tonsillar exudate  Eyes: Conjunctivae are normal  Right eye exhibits no discharge  Left eye exhibits no discharge  Neck: Normal range of motion  Neck supple  No thyromegaly present  Cardiovascular: Normal rate, regular rhythm, normal heart sounds and intact distal pulses  No murmur heard  Pulmonary/Chest: Effort normal  She has wheezes  Abdominal: Soft  Bowel sounds are normal    Musculoskeletal: Normal range of motion  She exhibits no edema or deformity  Lymphadenopathy:     She has cervical adenopathy (anterior cervical chain)  Neurological: She is alert and oriented to person, place, and time  Skin: Skin is warm and dry  Capillary refill takes less than 2 seconds  She is not diaphoretic  Psychiatric: She has a normal mood and affect  Her behavior is normal  Judgment and thought content normal    Nursing note and vitals reviewed    No Known Allergies    Patient Active Problem List   Diagnosis    GERD without esophagitis    Hyperlipidemia    Other depressive disorder    Hallux rigidus       Current Outpatient Medications:    atorvastatin (LIPITOR) 40 mg tablet, Take 1 tablet (40 mg total) by mouth daily at bedtime, Disp: 90 tablet, Rfl: 1    Calcium Carbonate-Vitamin D (CALCIUM 600+D PO), Take 1 tablet by mouth daily, Disp: , Rfl:     multivitamin (THERAGRAN) TABS, Take 1 tablet by mouth daily at bedtime  , Disp: , Rfl:     albuterol (PROVENTIL HFA,VENTOLIN HFA) 90 mcg/act inhaler, Inhale 2 puffs every 6 (six) hours as needed for wheezing, Disp: 1 Inhaler, Rfl: 0    amoxicillin-clavulanate (AUGMENTIN) 875-125 mg per tablet, Take 1 tablet by mouth every 12 (twelve) hours for 7 days, Disp: 14 tablet, Rfl: 0    benzonatate (TESSALON PERLES) 100 mg capsule, Take 1 capsule (100 mg total) by mouth 3 (three) times a day as needed for cough, Disp: 20 capsule, Rfl: 0  Social History     Socioeconomic History    Marital status: /Civil Union     Spouse name: Not on file    Number of children: Not on file    Years of education: Not on file    Highest education level: Not on file   Occupational History    Not on file   Social Needs    Financial resource strain: Not hard at all   [employer] insecurity:     Worry: Never true     Inability: Never true   [employer] needs:     Medical: No     Non-medical: No   Tobacco Use    Smoking status: Never Smoker    Smokeless tobacco: Never Used   Substance and Sexual Activity    Alcohol use: Yes     Frequency: Monthly or less     Binge frequency: Never    Drug use: No    Sexual activity: Yes     Partners: Male   Lifestyle    Physical activity:     Days per week: 3 days     Minutes per session: 60 min    Stress:  Only a little   Relationships    Social connections:     Talks on phone: More than three times a week     Gets together: More than three times a week     Attends Anab[employer]ist service: 1 to 4 times per year     Active member of club or organization: No     Attends meetings of clubs or organizations: Never     Relationship status:     Intimate partner violence:     Fear of current or ex partner: No     Emotionally abused: No     Physically abused: No     Forced sexual activity: No   Other Topics Concern    Not on file   Social History Narrative    EXERCISE REGULARLY      Family History   Problem Relation Age of Onset    Heart disease Mother     Colon polyps Mother     Hypertension Father     Kidney failure Father     Coronary artery disease Family     Hypertension Family     Lung cancer Sister 61    No Known Problems Daughter     Breast cancer Maternal Grandmother 36    Breast cancer Paternal Grandmother [de-identified]    Breast cancer Maternal Aunt     No Known Problems Maternal Aunt     No Known Problems Maternal Aunt     No Known Problems Maternal Aunt     No Known Problems Paternal Aunt     No Known Problems Sister     No Known Problems Son

## 2020-01-30 NOTE — PATIENT INSTRUCTIONS
Albuterol (By breathing)   Albuterol (al-BUE-ter-ol)  Treats or prevents bronchospasm  Brand Name(s): AccuNeb, ProAir HFA, Proventil HFA, ReliOn Ventolin HFA, Ventolin HFA   There may be other brand names for this medicine  When This Medicine Should Not Be Used: This medicine is not right for everyone  Do not use it if you had an allergic reaction to albuterol or milk proteins  How to Use This Medicine:   Aerosol, Powder Under Pressure, Suspension, Solution  · Your doctor will tell you how much medicine to use  Do not use more than directed  Ask your doctor or pharmacist if you have questions about how to use your inhaler, nebulizer, or medicine  · Solution:   ¨ You will use this medicine with an inhaler device called a nebulizer  The nebulizer turns the medicine into a fine mist that you breathe in through your mouth and to your lungs  Your caregiver will show you how to use your nebulizer  ¨ Store unopened vials of this medicine at room temperature, away from heat and direct light  Do not freeze  An open vial of medicine must be used right away  · Aerosol inhaler:   ¨ Shake the inhaler well just before each use  Avoid spraying this medicine into your eyes  ¨ Test spray in the air before using for the first time or if the inhaler has not been used for a while  ¨ If you are supposed to use more than one puff, wait 1 to 2 minutes before inhaling the second puff  Repeat these steps for the next puff, starting with shaking the inhaler  ¨ Clean the inhaler mouthpiece at least once a week with warm running water for 30 seconds  Let it air dry completely  ¨ Store the canister at room temperature, away from heat and direct light  Do not freeze  Do not keep this medicine inside a car where it could be exposed to extreme heat or cold  Do not poke holes in the canister or throw it into a fire, even if the canister is empty  · ProAir® Respiclick® inhaler:   ¨ Make sure the cap is closed   Do not open the cap unless you are going to use the medicine  ¨ Hold the inhaler upright  Open the cap fully until you hear a click  Your inhaler is now ready to use  ¨ Close the cap firmly over the mouthpiece after each use  ¨ Keep the inhaler clean and dry at all times  Do not wash or put any part of the inhaler in water  ¨ To clean the mouthpiece, wipe it gently with a dry cloth or tissue  ¨ Keep the medicine in the foil pouch until you are ready to use it  Store at room temperature, away from heat and direct light  Do not freeze  · Read and follow the patient instructions that come with this medicine  Talk to your doctor or pharmacist if you have any questions  · Missed dose: Take a dose as soon as you remember  If it is almost time for your next dose, wait until then and take a regular dose  Do not take extra medicine to make up for a missed dose  Drugs and Foods to Avoid:   Ask your doctor or pharmacist before using any other medicine, including over-the-counter medicines, vitamins, and herbal products  · Some foods and medicines can affect how albuterol works  Tell your doctor if you are using any of the following:  ¨ Digoxin  ¨ Beta-blocker medicine  ¨ Diuretic (water pill)  ¨ Medicine for depression or an MAO inhibitor within the past 2 weeks  Warnings While Using This Medicine:   · Tell your doctor if you are pregnant or breastfeeding, or if you have kidney disease, diabetes, heart disease, heart rhythm problems, high blood pressure, overactive thyroid, or a history of seizures  · This medicine may cause the following problems:   ¨ Paradoxical bronchospasm (increased trouble breathing right after use)  ¨ Low potassium levels in the blood  · If you use a corticosteroid medicine to control your asthma, keep using it as instructed by your doctor  · Call your doctor if your symptoms do not improve or if they get worse    · If any of your asthma medicines do not seem to be working as well as usual, call your doctor right away  Do not change your doses or stop using your medicines without asking your doctor  · Your doctor will check your progress and the effects of this medicine at regular visits  Keep all appointments  · Keep all medicine out of the reach of children  Never share your medicine with anyone  Possible Side Effects While Using This Medicine:   Call your doctor right away if you notice any of these side effects:  · Allergic reaction: Itching or hives, swelling in your face or hands, swelling or tingling in your mouth or throat, chest tightness, trouble breathing  · Dry mouth, increased thirst, muscle cramps, nausea, vomiting  · Fast, pounding, or uneven heartbeat, chest pain  · Lightheadedness, dizziness, or fainting  · Trouble breathing, increased wheezing, cough, chest tightness  If you notice these less serious side effects, talk with your doctor:   · Cough, sore throat, runny or stuffy nose  · Headache  · Tremors, nervousness  If you notice other side effects that you think are caused by this medicine, tell your doctor  Call your doctor for medical advice about side effects  You may report side effects to FDA at 1-782-FDA-1729  © 2017 [address] [employer]  Information is for End User's use only and may not be sold, redistributed or otherwise used for commercial purposes  The above information is an  only  It is not intended as medical advice for individual conditions or treatments  Talk to your doctor, nurse or pharmacist before following any medical regimen to see if it is safe and effective for you  Wheezing   WHAT YOU NEED TO KNOW:   Wheezing happens when air flows through a narrowed airway  Wheezing can happen when you breathe in, breathe out, or both  Wheezes may sound like a whistle, squeal, groan, or creak  Wheezes may also sound musical or high-pitched  Wheezing cannot be stopped by coughing  Asthma, allergies, or infection are the most common causes of wheezing   A foreign body, asthma, extra mucus, or smoking can also cause wheezing  DISCHARGE INSTRUCTIONS:   Call 911 if:   · You have sudden trouble breathing  · Your throat feels like it is swelling or feels tight  · You are dizzy, lightheaded, confused, or feel faint  · You have chest pain or tightness  Return to the emergency department if:   · You have shortness of breath  · You are coughing up blood  · You have chest pain  Contact your healthcare provider if:   · You have a fever  · Your wheezing does not get better or it gets worse  · You have questions or concerns about your condition or care  Medicines:   · Medicines  decrease inflammation, open airways, and make it easier to breathe  · Take your medicine as directed  Contact your healthcare provider if you think your medicine is not helping or if you have side effects  Tell him of her if you are allergic to any medicine  Keep a list of the medicines, vitamins, and herbs you take  Include the amounts, and when and why you take them  Bring the list or the pill bottles to follow-up visits  Carry your medicine list with you in case of an emergency  Follow up with your healthcare provider as directed: You may be referred to a specialist  Write down your questions so you remember to ask them during your visits  Manage your symptoms:   · Avoid allergy triggers , such as animals, grass, pollen, or dust     · Return to your usual activity as directed  You may need to limit certain activities until you follow up with your healthcare provider or your symptoms improve  Your child may need to avoid sports until his symptoms improve  · Take deep breaths and cough several times a day  This will decrease your risk for a lung infection and help decrease wheezing  Take a deep breath and hold it for as long as you can  Let the air out and then cough strongly  Deep breaths help open your airway   You may be given an incentive spirometer to help you take deep breaths  Put the plastic piece in your mouth and take a slow, deep breath, then let the air out and cough  Repeat these steps 10 times every hour  · Drink liquids as directed  You may need to drink more liquids than usual to thin your mucus and prevent dehydration  Ask how much liquid to drink each day and which liquids are best for you  © 2017 [address] [employer] Information is for End User's use only and may not be sold, redistributed or otherwise used for commercial purposes  All illustrations and images included in CareNotes® are the copyrighted property of [employer]  or Bet[employer] Santamaria  The above information is an  only  It is not intended as medical advice for individual conditions or treatments  Talk to your doctor, nurse or pharmacist before following any medical regimen to see if it is safe and effective for you  Sinusitis   WHAT YOU NEED TO KNOW:   Sinusitis is inflammation or infection of your sinuses  It is most often caused by a virus  Acute sinusitis may last up to 12 weeks  Chronic sinusitis lasts longer than 12 weeks  Recurrent sinusitis means you have 4 or more times in 1 year  DISCHARGE INSTRUCTIONS:   Return to the emergency department if:   · Your eye and eyelid are red, swollen, and painful  · You cannot open your eye  · You have vision changes, such as double vision  · Your eyeball bulges out or you cannot move your eye  · You are more sleepy than normal, or you notice changes in your ability to think, move, or talk  · You have a stiff neck, a fever, or a bad headache  · You have swelling of your forehead or scalp  Contact your healthcare provider if:   · Your symptoms do not improve after 3 days  · Your symptoms do not go away after 10 days  · You have nausea and are vomiting  · Your nose is bleeding  · You have questions or concerns about your condition or care  Medicines: Your symptoms may go away on their own  Your healthcare provider may recommend watchful waiting for up to 10 days before starting antibiotics  You may  need any of the following:  · Acetaminophen  decreases pain and fever  It is available without a doctor's order  Ask how much to take and how often to take it  Follow directions  Read the labels of all other medicines you are using to see if they also contain acetaminophen, or ask your doctor or pharmacist  Acetaminophen can cause liver damage if not taken correctly  Do not use more than 4 grams (4,000 milligrams) total of acetaminophen in one day  · NSAIDs , such as ibuprofen, help decrease swelling, pain, and fever  This medicine is available with or without a doctor's order  NSAIDs can cause stomach bleeding or kidney problems in certain people  If you take blood thinner medicine, always ask your healthcare provider if NSAIDs are safe for you  Always read the medicine label and follow directions  · Nasal steroid sprays  may help decrease inflammation in your nose and sinuses  · Decongestants  help reduce swelling and drain mucus in the nose and sinuses  They may help you breathe easier  · Antihistamines  help dry mucus in the nose and relieve sneezing  · Antibiotics  help treat or prevent a bacterial infection  · Take your medicine as directed  Contact your healthcare provider if you think your medicine is not helping or if you have side effects  Tell him or her if you are allergic to any medicine  Keep a list of the medicines, vitamins, and herbs you take  Include the amounts, and when and why you take them  Bring the list or the pill bottles to follow-up visits  Carry your medicine list with you in case of an emergency  Self-care:   · Rinse your sinuses  Use a sinus rinse device to rinse your nasal passages with a saline (salt water) solution or distilled water  Do not use tap water  This will help thin the mucus in your nose and rinse away pollen and dirt   It will also help reduce swelling so you can breathe normally  Ask your healthcare provider how often to do this  · Breathe in steam   Heat a bowl of water until you see steam  Lean over the bowl and make a tent over your head with a large towel  Breathe deeply for about 20 minutes  Be careful not to get too close to the steam or burn yourself  Do this 3 times a day  You can also breathe deeply when you take a hot shower  · Sleep with your head elevated  Place an extra pillow under your head before you go to sleep to help your sinuses drain  · Drink liquids as directed  Ask your healthcare provider how much liquid to drink each day and which liquids are best for you  Liquids will thin the mucus in your nose and help it drain  Avoid drinks that contain alcohol or caffeine  · Do not smoke, and avoid secondhand smoke  Nicotine and other chemicals in cigarettes and cigars can make your symptoms worse  Ask your healthcare provider for information if you currently smoke and need help to quit  E-cigarettes or smokeless tobacco still contain nicotine  Talk to your healthcare provider before you use these products  Prevent the spread of germs that cause sinusitis:  Wash your hands often with soap and water  Wash your hands after you use the bathroom, change a child's diaper, or sneeze  Wash your hands before you prepare or eat food  Follow up with your healthcare provider as directed: You may be referred to an ear, nose, and throat specialist  Write down your questions so you remember to ask them during your visits  © 2017 [address] Information is for End User's use only and may not be sold, redistributed or otherwise used for commercial purposes  All illustrations and images included in CareNotes® are the copyrighted property of [employer]  or [employer] Santamaria  The above information is an  only   It is not intended as medical advice for individual conditions or treatments  Talk to your doctor, nurse or pharmacist before following any medical regimen to see if it is safe and effective for you

## 2020-05-10 DIAGNOSIS — E78.5 HYPERLIPIDEMIA, UNSPECIFIED HYPERLIPIDEMIA TYPE: ICD-10-CM

## 2020-05-11 RX ORDER — ATORVASTATIN CALCIUM 40 MG/1
TABLET, FILM COATED ORAL
Qty: 90 TABLET | Refills: 1 | OUTPATIENT
Start: 2020-05-11

## 2020-05-19 DIAGNOSIS — E78.5 HYPERLIPIDEMIA, UNSPECIFIED HYPERLIPIDEMIA TYPE: ICD-10-CM

## 2020-05-19 RX ORDER — ATORVASTATIN CALCIUM 40 MG/1
TABLET, FILM COATED ORAL
Qty: 90 TABLET | Refills: 1 | OUTPATIENT
Start: 2020-05-19

## 2020-07-14 ENCOUNTER — TELEPHONE (OUTPATIENT)
Dept: FAMILY MEDICINE CLINIC | Facility: CLINIC | Age: 60
End: 2020-07-14

## 2020-07-14 DIAGNOSIS — E78.5 HYPERLIPIDEMIA, UNSPECIFIED HYPERLIPIDEMIA TYPE: ICD-10-CM

## 2020-07-14 RX ORDER — ATORVASTATIN CALCIUM 40 MG/1
40 TABLET, FILM COATED ORAL
Qty: 90 TABLET | Refills: 1 | Status: SHIPPED | OUTPATIENT
Start: 2020-07-14 | End: 2021-01-13 | Stop reason: SDUPTHER

## 2020-07-14 NOTE — TELEPHONE ENCOUNTER
atorvastatin (LIPITOR) 40 mg tablet [[id number]    Patient called, she asked if Rx could be sent to [facility] in [place]  Thank you

## 2020-07-16 ENCOUNTER — TRANSCRIBE ORDERS (OUTPATIENT)
Dept: ADMINISTRATIVE | Facility: HOSPITAL | Age: 60
End: 2020-07-16

## 2020-07-16 DIAGNOSIS — Z12.31 VISIT FOR SCREENING MAMMOGRAM: Primary | ICD-10-CM

## 2020-07-22 ENCOUNTER — HOSPITAL ENCOUNTER (OUTPATIENT)
Dept: MAMMOGRAPHY | Facility: CLINIC | Age: 60
Discharge: HOME | End: 2020-07-22
Payer: COMMERCIAL

## 2020-07-22 VITALS — HEIGHT: 64 IN | WEIGHT: 195 LBS | BODY MASS INDEX: 33.29 KG/M2

## 2020-07-22 DIAGNOSIS — Z12.31 VISIT FOR SCREENING MAMMOGRAM: ICD-10-CM

## 2020-07-22 PROCEDURE — 77063 BREAST TOMOSYNTHESIS BI: CPT

## 2020-07-22 PROCEDURE — 77067 SCR MAMMO BI INCL CAD: CPT

## 2020-08-31 ENCOUNTER — OFFICE VISIT (OUTPATIENT)
Dept: FAMILY MEDICINE CLINIC | Facility: CLINIC | Age: 60
End: 2020-08-31
Payer: COMMERCIAL

## 2020-08-31 VITALS
RESPIRATION RATE: 18 BRPM | WEIGHT: 177 LBS | OXYGEN SATURATION: 99 % | BODY MASS INDEX: 30.22 KG/M2 | DIASTOLIC BLOOD PRESSURE: 66 MMHG | SYSTOLIC BLOOD PRESSURE: 120 MMHG | HEIGHT: 64 IN | TEMPERATURE: 98.8 F | HEART RATE: 74 BPM

## 2020-08-31 DIAGNOSIS — Z79.899 HIGH RISK MEDICATION USE: ICD-10-CM

## 2020-08-31 DIAGNOSIS — Z13.6 SCREENING FOR CARDIOVASCULAR CONDITION: ICD-10-CM

## 2020-08-31 DIAGNOSIS — E78.2 MIXED HYPERLIPIDEMIA: ICD-10-CM

## 2020-08-31 DIAGNOSIS — Z00.00 ENCOUNTER FOR WELL ADULT EXAM WITHOUT ABNORMAL FINDINGS: Primary | ICD-10-CM

## 2020-08-31 DIAGNOSIS — Z11.4 SCREENING FOR HIV (HUMAN IMMUNODEFICIENCY VIRUS): ICD-10-CM

## 2020-08-31 DIAGNOSIS — Z23 NEED FOR INFLUENZA VACCINATION: ICD-10-CM

## 2020-08-31 PROCEDURE — 90682 RIV4 VACC RECOMBINANT DNA IM: CPT | Performed by: FAMILY MEDICINE

## 2020-08-31 PROCEDURE — 90471 IMMUNIZATION ADMIN: CPT | Performed by: FAMILY MEDICINE

## 2020-08-31 PROCEDURE — 3008F BODY MASS INDEX DOCD: CPT | Performed by: NURSE PRACTITIONER

## 2020-08-31 PROCEDURE — 99396 PREV VISIT EST AGE 40-64: CPT | Performed by: FAMILY MEDICINE

## 2020-08-31 NOTE — PROGRESS NOTES
Assessment/Plan:       Diagnoses and all orders for this visit:    Encounter for well adult exam without abnormal findings    Screening for HIV (human immunodeficiency virus)  -     [facility]- Human Immunodeficiency Virus 1/2 Antigen / Antibody ( Fourth Generation) with Reflex Testing; Future    Mixed hyperlipidemia  -     Lipid panel; Future  -     Lipid panel    Screening for cardiovascular condition  -     CBC; Future  -     Comprehensive metabolic panel; Future  -     Lipid panel; Future  -     UA (URINE) with reflex to Scope; Future  -     CBC  -     Comprehensive metabolic panel  -     Lipid panel  -     UA (URINE) with reflex to Scope    High risk medication use  -     CBC; Future  -     Comprehensive metabolic panel; Future  -     UA (URINE) with reflex to Scope; Future  -     CBC  -     Comprehensive metabolic panel  -     UA (URINE) with reflex to Scope    BMI 30 0-30 9,adult    Need for influenza vaccination  -     influenza vaccine, quadrivalent, recombinant, PF, 0 5 mL, for patients 18 yr+ (FLUBLOK)      flu shot given  Labs ordered  We discussed patient's mild depression at this time  She states she was on Celexa in the past does not want to go back to any medications at this time  She will continue to work her way through it and if needed will call me  Source arthritis go she will monitor for now  She may consider an over-the-counter topical anti-inflammatory if needed for pain  Should follow up with me in 1 year or sooner if needed      Subjective:     Chief Complaint   Patient presents with    Physical Exam     Patient has a few questions about her arthritis        Patient ID: Yuly Juarez is a 64 y o  female      Patient is here today for yearly well visit  She reports is having some mild depression symptoms  She was on citalopram in the past  A depression is mostly situational due to the pandemic as well as a sick pet  Otherwise she is having some arthritic changes in her fingers      The following portions of the patient's history were reviewed and updated as appropriate: allergies, current medications, past family history, past medical history, past social history, past surgical history and problem list     Review of Systems   Constitutional: Negative  HENT: Negative  Eyes: Negative  Respiratory: Negative  Cardiovascular: Negative  Gastrointestinal: Negative  Endocrine: Negative  Genitourinary: Negative  Musculoskeletal: Negative  Skin: Negative  Allergic/Immunologic: Negative  Neurological: Negative  Hematological: Negative  Psychiatric/Behavioral: Negative  All other systems reviewed and are negative  Objective:    Vitals:    08/31/20 1450   BP: 120/66   BP Location: Left arm   Patient Position: Sitting   Cuff Size: Standard   Pulse: 74   Resp: 18   Temp: 98 8 °F (37 1 °C)   TempSrc: Tympanic   SpO2: 99%   Weight: 80 3 kg (177 lb)   Height: 5' 4" (1 626 m)          Physical Exam  Vitals signs and nursing note reviewed  Constitutional:       Appearance: She is well-developed  HENT:      Head: Normocephalic and atraumatic  Right Ear: External ear normal       Left Ear: External ear normal    Eyes:      Conjunctiva/sclera: Conjunctivae normal       Pupils: Pupils are equal, round, and reactive to light  Neck:      Musculoskeletal: Normal range of motion  Cardiovascular:      Rate and Rhythm: Normal rate and regular rhythm  Heart sounds: Normal heart sounds  Pulmonary:      Effort: Pulmonary effort is normal       Breath sounds: Normal breath sounds  Abdominal:      General: Bowel sounds are normal       Palpations: Abdomen is soft  Musculoskeletal: Normal range of motion  Skin:     General: Skin is warm and dry  Neurological:      Mental Status: She is alert and oriented to person, place, and time  Deep Tendon Reflexes: Reflexes are normal and symmetric     Psychiatric:         Behavior: Behavior normal          Thought Content: Thought content normal          Judgment: Judgment normal          BMI Counseling: Body mass index is 30 38 kg/m²  The BMI is above normal  Nutrition recommendations include reducing portion sizes, decreasing overall calorie intake, 3-5 servings of fruits/vegetables daily, reducing fast food intake, consuming healthier snacks, decreasing soda and/or juice intake, moderation in carbohydrate intake, increasing intake of lean protein, reducing intake of saturated fat and trans fat and reducing intake of cholesterol  Exercise recommendations include exercising 3-5 times per week

## 2020-08-31 NOTE — PATIENT INSTRUCTIONS
Obesity   AMBULATORY CARE:   Obesity  is when your body mass index (BMI) is greater than 30  Your healthcare provider will use your height and weight to measure your BMI  The risks of obesity include  many health problems, such as injuries or physical disability  You may need tests to check for the following:  · Diabetes     · High blood pressure or high cholesterol     · Heart disease     · Gallbladder or liver disease     · Cancer of the colon, breast, prostate, liver, or kidney     · Sleep apnea     · Arthritis or gout  Seek care immediately if:   · You have a severe headache, confusion, or difficulty speaking  · You have weakness on one side of your body  · You have chest pain, sweating, or shortness of breath  Contact your healthcare provider if:   · You have symptoms of gallbladder or liver disease, such as pain in your upper abdomen  · You have knee or hip pain and discomfort while walking  · You have symptoms of diabetes, such as intense hunger and thirst, and frequent urination  · You have symptoms of sleep apnea, such as snoring or daytime sleepiness  · You have questions or concerns about your condition or care  Treatment for obesity  focuses on helping you lose weight to improve your health  Even a small decrease in BMI can reduce the risk for many health problems  Your healthcare provider will help you set a weight-loss goal   · Lifestyle changes  are the first step in treating obesity  These include making healthy food choices and getting regular physical activity  Your healthcare provider may suggest a weight-loss program that involves coaching, education, and therapy  · Medicine  may help you lose weight when it is used with a healthy diet and physical activity  · Surgery  can help you lose weight if you are very obese and have other health problems  There are several types of weight-loss surgery  Ask your healthcare provider for more information    Be successful losing weight:   · Set small, realistic goals  An example of a small goal is to walk for 20 minutes 5 days a week  Anther goal is to lose 5% of your body weight  · Tell friends, family members, and coworkers about your goals  and ask for their support  Ask a friend to lose weight with you, or join a weight-loss support group  · Identify foods or triggers that may cause you to overeat , and find ways to avoid them  Remove tempting high-calorie foods from your home and workplace  Place a bowl of fresh fruit on your kitchen counter  If stress causes you to eat, then find other ways to cope with stress  · Keep a diary to track what you eat and drink  Also write down how many minutes of physical activity you do each day  Weigh yourself once a week and record it in your diary  Eating changes: You will need to eat 500 to 1,000 fewer calories each day than you currently eat to lose 1 to 2 pounds a week  The following changes will help you cut calories:  · Eat smaller portions  Use small plates, no larger than 9 inches in diameter  Fill your plate half full of fruits and vegetables  Measure your food using measuring cups until you know what a serving size looks like  · Eat 3 meals and 1 or 2 snacks each day  Plan your meals in advance  [place] and eat at home most of the time  Eat slowly  · Eat fruits and vegetables at every meal   They are low in calories and high in fiber, which makes you feel full  Do not add butter, margarine, or cream sauce to vegetables  Use herbs to season steamed vegetables  · Eat less fat and fewer fried foods  Eat more baked or grilled chicken and fish  These protein sources are lower in calories and fat than red meat  Limit fast food  Dress your salads with olive oil and vinegar instead of bottled dressing  · Limit the amount of sugar you eat  Do not drink sugary beverages  Limit alcohol  Activity changes:  Physical activity is good for your body in many ways   It helps you burn calories and build strong muscles  It decreases stress and depression, and improves your mood  It can also help you sleep better  Talk to your healthcare provider before you begin an exercise program   · Exercise for at least 30 minutes 5 days a week  Start slowly  Set aside time each day for physical activity that you enjoy and that is convenient for you  It is best to do both weight training and an activity that increases your heart rate, such as walking, bicycling, or swimming  · Find ways to be more active  Do yard work and housecleaning  Walk up the stairs instead of using elevators  Spend your leisure time going to events that require walking, such as outdoor festivals or fairs  This extra physical activity can help you lose weight and keep it off  Follow up with your healthcare provider as directed: You may need to meet with a dietitian  Write down your questions so you remember to ask them during your visits  © 2017 [employer] Information is for End User's use only and may not be sold, redistributed or otherwise used for commercial purposes  All illustrations and images included in CareNotes® are the copyrighted property of [employer]  or Beto Santamaria  The above information is an  only  It is not intended as medical advice for individual conditions or treatments  Talk to your doctor, nurse or pharmacist before following any medical regimen to see if it is safe and effective for you  Weight Management   AMBULATORY CARE:   Why it is important to manage your weight:  Being overweight increases your risk of health conditions such as heart disease, high blood pressure, type 2 diabetes, and certain types of cancer  It can also increase your risk for osteoarthritis, sleep apnea, and other respiratory problems  Aim for a slow, steady weight loss  Even a small amount of weight loss can lower your risk of health problems    How to lose weight safely:  A safe and healthy way to lose weight is to eat fewer calories and get regular exercise  You can lose up about 1 pound a week by decreasing the number of calories you eat by 500 calories each day  You can decrease calories by eating smaller portion sizes or by cutting out high-calorie foods  Read labels to find out how many calories are in the foods you eat  You can also burn calories with exercise such as walking, swimming, or biking  You will be more likely to keep weight off if you make these changes part of your lifestyle  Healthy meal plan for weight management:  A healthy meal plan includes a variety of foods, contains fewer calories, and helps you stay healthy  A healthy meal plan includes the following:  · Eat whole-grain foods more often  A healthy meal plan should contain fiber  Fiber is the part of grains, fruits, and vegetables that is not broken down by your body  Whole-grain foods are healthy and provide extra fiber in your diet  Some examples of whole-grain foods are whole-wheat breads and pastas, oatmeal, brown rice, and bulgur  · Eat a variety of vegetables every day  Include dark, leafy greens such as spinach, kale, tg greens, and mustard greens  Eat yellow and orange vegetables such as carrots, sweet potatoes, and winter squash  · Eat a variety of fruits every day  Choose fresh or canned fruit (canned in its own juice or light syrup) instead of juice  Fruit juice has very little or no fiber  · Eat low-fat dairy foods  Drink fat-free (skim) milk or 1% milk  Eat fat-free yogurt and low-fat cottage cheese  Try low-fat cheeses such as mozzarella and other reduced-fat cheeses  · Choose meat and other protein foods that are low in fat  Choose beans or other legumes such as split peas or lentils  Choose fish, skinless poultry (chicken or turkey), or lean cuts of red meat (beef or pork)  Before you cook meat or poultry, cut off any visible fat  · Use less fat and oil  Try baking foods instead of frying them  Add less fat, such as margarine, sour cream, regular salad dressing and mayonnaise to foods  Eat fewer high-fat foods  Some examples of high-fat foods include french fries, doughnuts, ice cream, and cakes  · Eat fewer sweets  Limit foods and drinks that are high in sugar  This includes candy, cookies, regular soda, and sweetened drinks  Ways to decrease calories:   · Eat smaller portions  ¨ Use a small plate with smaller servings  ¨ Do not eat second helpings  ¨ When you eat at a restaurant, ask for a box and place half of your meal in the box before you eat  ¨ Share an entrée with someone else  · Replace high-calorie snacks with healthy, low-calorie snacks  ¨ Choose fresh fruit, vegetables, fat-free rice cakes, or air-popped popcorn instead of potato chips, nuts, or chocolate  ¨ Choose water or calorie-free drinks instead of soda or sweetened drinks  · Eat regular meals  Skipping meals can lead to overeating later in the day  Eat a healthy snack in place of a meal if you do not have time to eat a regular meal      · Do not shop for groceries when you are hungry  You may be more likely to make unhealthy food choices  Take a grocery list of healthy foods and shop after you have eaten  Exercise:  Exercise at least 30 minutes per day on most days of the week  Some examples of exercise include walking, biking, dancing, and swimming  You can also fit in more physical activity by taking the stairs instead of the elevator or parking farther away from stores  Ask your healthcare provider about the best exercise plan for you  Other things to consider as you try to lose weight:   · Be aware of situations that may give you the urge to overeat, such as eating while watching television  Find ways to avoid these situations  For example, read a book, go for a walk, or do crafts      · Meet with a weight loss support group or friends who are also trying to lose weight  This may help you stay motivated to continue working on your weight loss goals  © 2017 [employer] Information is for End User's use only and may not be sold, redistributed or otherwise used for commercial purposes  All illustrations and images included in CareNotes® are the copyrighted property of [employer]  or Bet[employer] Santamaria  The above information is an  only  It is not intended as medical advice for individual conditions or treatments  Talk to your doctor, nurse or pharmacist before following any medical regimen to see if it is safe and effective for you

## 2020-10-01 LAB
ALBUMIN SERPL-MCNC: 4.5 G/DL (ref 3.8–4.9)
ALBUMIN/GLOB SERPL: 2 {RATIO} (ref 1.2–2.2)
ALP SERPL-CCNC: 70 IU/L (ref 39–117)
ALT SERPL-CCNC: 28 IU/L (ref 0–32)
APPEARANCE UR: CLEAR
AST SERPL-CCNC: 21 IU/L (ref 0–40)
BILIRUB SERPL-MCNC: 0.6 MG/DL (ref 0–1.2)
BILIRUB UR QL STRIP: NEGATIVE
BUN SERPL-MCNC: 23 MG/DL (ref 6–24)
BUN/CREAT SERPL: 23 (ref 9–23)
CALCIUM SERPL-MCNC: 10.2 MG/DL (ref 8.7–10.2)
CHLORIDE SERPL-SCNC: 102 MMOL/L (ref 96–106)
CHOLEST SERPL-MCNC: 192 MG/DL (ref 100–199)
CHOLEST/HDLC SERPL: 3.2 RATIO (ref 0–4.4)
CO2 SERPL-SCNC: 22 MMOL/L (ref 20–29)
COLOR UR: YELLOW
CREAT SERPL-MCNC: 1.02 MG/DL (ref 0.57–1)
ERYTHROCYTE [DISTWIDTH] IN BLOOD BY AUTOMATED COUNT: 11.9 % (ref 11.7–15.4)
GLOBULIN SER-MCNC: 2.3 G/DL (ref 1.5–4.5)
GLUCOSE SERPL-MCNC: 105 MG/DL (ref 65–99)
GLUCOSE UR QL: NEGATIVE
HCT VFR BLD AUTO: 45 % (ref 34–46.6)
HDLC SERPL-MCNC: 60 MG/DL
HGB BLD-MCNC: 14.8 G/DL (ref 11.1–15.9)
HGB UR QL STRIP: NEGATIVE
HIV 1+2 AB+HIV1 P24 AG SERPL QL IA: NON REACTIVE
KETONES UR QL STRIP: NEGATIVE
LDLC SERPL CALC-MCNC: 116 MG/DL (ref 0–99)
LEUKOCYTE ESTERASE UR QL STRIP: NEGATIVE
MCH RBC QN AUTO: 30.2 PG (ref 26.6–33)
MCHC RBC AUTO-ENTMCNC: 32.9 G/DL (ref 31.5–35.7)
MCV RBC AUTO: 92 FL (ref 79–97)
MICRO URNS: NORMAL
NITRITE UR QL STRIP: NEGATIVE
PH UR STRIP: 7 [PH] (ref 5–7.5)
PLATELET # BLD AUTO: 186 X10E3/UL (ref 150–450)
POTASSIUM SERPL-SCNC: 5 MMOL/L (ref 3.5–5.2)
PROT SERPL-MCNC: 6.8 G/DL (ref 6–8.5)
PROT UR QL STRIP: NEGATIVE
RBC # BLD AUTO: 4.9 X10E6/UL (ref 3.77–5.28)
SL AMB EGFR AFRICAN AMERICAN: 70 ML/MIN/1.73
SL AMB EGFR NON AFRICAN AMERICAN: 61 ML/MIN/1.73
SL AMB VLDL CHOLESTEROL CALC: 16 MG/DL (ref 5–40)
SODIUM SERPL-SCNC: 138 MMOL/L (ref 134–144)
SP GR UR: 1.02 (ref 1–1.03)
TRIGL SERPL-MCNC: 88 MG/DL (ref 0–149)
UROBILINOGEN UR STRIP-ACNC: 0.2 MG/DL (ref 0.2–1)
WBC # BLD AUTO: 4.3 X10E3/UL (ref 3.4–10.8)

## 2021-01-13 DIAGNOSIS — E78.5 HYPERLIPIDEMIA, UNSPECIFIED HYPERLIPIDEMIA TYPE: ICD-10-CM

## 2021-01-13 RX ORDER — ATORVASTATIN CALCIUM 40 MG/1
40 TABLET, FILM COATED ORAL
Qty: 90 TABLET | Refills: 1 | Status: SHIPPED | OUTPATIENT
Start: 2021-01-13 | End: 2021-07-16 | Stop reason: SDUPTHER

## 2021-04-06 DIAGNOSIS — Z23 ENCOUNTER FOR IMMUNIZATION: ICD-10-CM

## 2021-04-08 ENCOUNTER — IMMUNIZATIONS (OUTPATIENT)
Dept: FAMILY MEDICINE CLINIC | Facility: HOSPITAL | Age: 61
End: 2021-04-08

## 2021-04-08 DIAGNOSIS — Z23 ENCOUNTER FOR IMMUNIZATION: Primary | ICD-10-CM

## 2021-04-08 PROCEDURE — 91300 SARS-COV-2 / COVID-19 MRNA VACCINE (PFIZER-BIONTECH) 30 MCG: CPT

## 2021-04-08 PROCEDURE — 0001A SARS-COV-2 / COVID-19 MRNA VACCINE (PFIZER-BIONTECH) 30 MCG: CPT

## 2021-05-03 ENCOUNTER — IMMUNIZATIONS (OUTPATIENT)
Dept: FAMILY MEDICINE CLINIC | Facility: HOSPITAL | Age: 61
End: 2021-05-03

## 2021-05-03 DIAGNOSIS — Z23 ENCOUNTER FOR IMMUNIZATION: Primary | ICD-10-CM

## 2021-05-03 PROCEDURE — 91300 SARS-COV-2 / COVID-19 MRNA VACCINE (PFIZER-BIONTECH) 30 MCG: CPT

## 2021-05-03 PROCEDURE — 0002A SARS-COV-2 / COVID-19 MRNA VACCINE (PFIZER-BIONTECH) 30 MCG: CPT

## 2021-07-16 DIAGNOSIS — E78.5 HYPERLIPIDEMIA, UNSPECIFIED HYPERLIPIDEMIA TYPE: ICD-10-CM

## 2021-07-16 RX ORDER — ATORVASTATIN CALCIUM 40 MG/1
40 TABLET, FILM COATED ORAL
Qty: 90 TABLET | Refills: 1 | Status: SHIPPED | OUTPATIENT
Start: 2021-07-16 | End: 2022-02-07 | Stop reason: SDUPTHER

## 2021-07-27 ENCOUNTER — HOSPITAL ENCOUNTER (OUTPATIENT)
Dept: MAMMOGRAPHY | Facility: CLINIC | Age: 61
Discharge: HOME | End: 2021-07-27
Payer: COMMERCIAL

## 2021-07-27 VITALS — WEIGHT: 177 LBS | BODY MASS INDEX: 30.22 KG/M2 | HEIGHT: 64 IN

## 2021-07-27 DIAGNOSIS — Z12.31 ENCOUNTER FOR SCREENING MAMMOGRAM FOR MALIGNANT NEOPLASM OF BREAST: ICD-10-CM

## 2021-07-27 PROCEDURE — 77063 BREAST TOMOSYNTHESIS BI: CPT

## 2021-07-27 PROCEDURE — 77067 SCR MAMMO BI INCL CAD: CPT

## 2021-09-01 ENCOUNTER — OFFICE VISIT (OUTPATIENT)
Dept: FAMILY MEDICINE CLINIC | Facility: CLINIC | Age: 61
End: 2021-09-01
Payer: COMMERCIAL

## 2021-09-01 VITALS
HEIGHT: 63 IN | SYSTOLIC BLOOD PRESSURE: 122 MMHG | HEART RATE: 76 BPM | TEMPERATURE: 98.4 F | WEIGHT: 199 LBS | RESPIRATION RATE: 16 BRPM | OXYGEN SATURATION: 98 % | BODY MASS INDEX: 35.26 KG/M2 | DIASTOLIC BLOOD PRESSURE: 78 MMHG

## 2021-09-01 DIAGNOSIS — Z13.6 SCREENING FOR CARDIOVASCULAR CONDITION: ICD-10-CM

## 2021-09-01 DIAGNOSIS — Z00.00 ENCOUNTER FOR WELL ADULT EXAM WITHOUT ABNORMAL FINDINGS: Primary | ICD-10-CM

## 2021-09-01 PROCEDURE — 99396 PREV VISIT EST AGE 40-64: CPT | Performed by: FAMILY MEDICINE

## 2021-09-01 PROCEDURE — 1036F TOBACCO NON-USER: CPT | Performed by: FAMILY MEDICINE

## 2021-09-01 PROCEDURE — 3008F BODY MASS INDEX DOCD: CPT | Performed by: FAMILY MEDICINE

## 2021-09-01 PROCEDURE — 3725F SCREEN DEPRESSION PERFORMED: CPT | Performed by: FAMILY MEDICINE

## 2021-09-01 NOTE — PROGRESS NOTES
Assessment/Plan:       Diagnoses and all orders for this visit:    Encounter for well adult exam without abnormal findings    Screening for cardiovascular condition  -     CBC; Future  -     Comprehensive metabolic panel; Future  -     Lipid panel; Future  -     UA (URINE) with reflex to Scope; Future  -     CBC  -     Comprehensive metabolic panel  -     Lipid panel  -     UA (URINE) with reflex to Scope       82-year-old female   Labs ordered  But patient will likely be seeing Podiatry in potentially having foot surgery in the near future so will hold off on actually getting the lab work until a date is set for her procedure   Otherwise she is up-to-date on her health maintenance she will continue her current medications   We discussed her arthritis and would recommend a topical anti-inflammatory for her hands  She will follow-up with [facility] for her trigger finger   She can follow up with me in 1 year sooner if needed    Subjective:     Chief Complaint   Patient presents with    Annual Exam      62 y/o female ---- Due BMI F/U Plan        Patient ID: Triny Varner is a 63 y o  female  Patient presents today for yearly physical   She reports no acute complaints today   She does have chronic arthritis in her hands as well as trigger fingers  She has has hallux rigidus in her foot for which she will be seeing podiatrist for      The following portions of the patient's history were reviewed and updated as appropriate: allergies, current medications, past family history, past medical history, past social history, past surgical history and problem list     Review of Systems   Constitutional: Negative  HENT: Negative  Eyes: Negative  Respiratory: Negative  Cardiovascular: Negative  Gastrointestinal: Negative  Endocrine: Negative  Genitourinary: Negative  Musculoskeletal: Negative  Skin: Negative  Allergic/Immunologic: Negative  Neurological: Negative      Hematological: Negative  Psychiatric/Behavioral: Negative  All other systems reviewed and are negative  Objective:    Vitals:    09/01/21 1300   BP: 122/78   BP Location: Left arm   Patient Position: Sitting   Cuff Size: Standard   Pulse: 76   Resp: 16   Temp: 98 4 °F (36 9 °C)   TempSrc: Tympanic   SpO2: 98%   Weight: 90 3 kg (199 lb)   Height: 5' 3" (1 6 m)          Physical Exam  Vitals and nursing note reviewed  Constitutional:       Appearance: She is well-developed  HENT:      Head: Normocephalic and atraumatic  Right Ear: External ear normal       Left Ear: External ear normal    Eyes:      Conjunctiva/sclera: Conjunctivae normal       Pupils: Pupils are equal, round, and reactive to light  Cardiovascular:      Rate and Rhythm: Normal rate and regular rhythm  Heart sounds: Normal heart sounds  Pulmonary:      Effort: Pulmonary effort is normal       Breath sounds: Normal breath sounds  Abdominal:      General: Bowel sounds are normal       Palpations: Abdomen is soft  Musculoskeletal:         General: Normal range of motion  Cervical back: Normal range of motion  Skin:     General: Skin is warm and dry  Neurological:      Mental Status: She is alert and oriented to person, place, and time  Deep Tendon Reflexes: Reflexes are normal and symmetric  Psychiatric:         Behavior: Behavior normal          Thought Content: Thought content normal          Judgment: Judgment normal          BMI Counseling: Body mass index is 35 25 kg/m²  The BMI is above normal  Nutrition recommendations include reducing portion sizes, decreasing overall calorie intake, 3-5 servings of fruits/vegetables daily, reducing fast food intake, consuming healthier snacks, decreasing soda and/or juice intake, moderation in carbohydrate intake, increasing intake of lean protein, reducing intake of saturated fat and trans fat and reducing intake of cholesterol   Exercise recommendations include exercising 3-5 times per week

## 2021-09-01 NOTE — PATIENT INSTRUCTIONS
Obesity   AMBULATORY CARE:   Obesity  is when your body mass index (BMI) is greater than 30  Your healthcare provider will use your height and weight to measure your BMI  The risks of obesity include  many health problems, such as injuries or physical disability  You may need tests to check for the following:  · Diabetes    · High blood pressure or high cholesterol    · Heart disease    · Gallbladder or liver disease    · Cancer of the colon, breast, prostate, liver, or kidney    · Sleep apnea    · Arthritis or gout    Seek care immediately if:   · You have a severe headache, confusion, or difficulty speaking  · You have weakness on one side of your body  · You have chest pain, sweating, or shortness of breath  Contact your healthcare provider if:   · You have symptoms of gallbladder or liver disease, such as pain in your upper abdomen  · You have knee or hip pain and discomfort while walking  · You have symptoms of diabetes, such as intense hunger and thirst, and frequent urination  · You have symptoms of sleep apnea, such as snoring or daytime sleepiness  · You have questions or concerns about your condition or care  Treatment for obesity  focuses on helping you lose weight to improve your health  Even a small decrease in BMI can reduce the risk for many health problems  Your healthcare provider will help you set a weight-loss goal   · Lifestyle changes  are the first step in treating obesity  These include making healthy food choices and getting regular physical activity  Your healthcare provider may suggest a weight-loss program that involves coaching, education, and therapy  · Medicine  may help you lose weight when it is used with a healthy diet and physical activity  · Surgery  can help you lose weight if you are very obese and have other health problems  There are several types of weight-loss surgery  Ask your healthcare provider for more information      Be successful losing weight:   · Set small, realistic goals  An example of a small goal is to walk for 20 minutes 5 days a week  Anther goal is to lose 5% of your body weight  · Tell friends, family members, and coworkers about your goals  and ask for their support  Ask a friend to lose weight with you, or join a weight-loss support group  · Identify foods or triggers that may cause you to overeat , and find ways to avoid them  Remove tempting high-calorie foods from your home and workplace  Place a bowl of fresh fruit on your kitchen counter  If stress causes you to eat, then find other ways to cope with stress  · Keep a diary to track what you eat and drink  Also write down how many minutes of physical activity you do each day  Weigh yourself once a week and record it in your diary  Eating changes: You will need to eat 500 to 1,000 fewer calories each day than you currently eat to lose 1 to 2 pounds a week  The following changes will help you cut calories:  · Eat smaller portions  Use small plates, no larger than 9 inches in diameter  Fill your plate half full of fruits and vegetables  Measure your food using measuring cups until you know what a serving size looks like  · Eat 3 meals and 1 or 2 snacks each day  Plan your meals in advance  [place] and eat at home most of the time  Eat slowly  Do not skip meals  Skipping meals can lead to overeating later in the day  This can make it harder for you to lose weight  Talk with a dietitian to help you make a meal plan and schedule that is right for you  · Eat fruits and vegetables at every meal   They are low in calories and high in fiber, which makes you feel full  Do not add butter, margarine, or cream sauce to vegetables  Use herbs to season steamed vegetables  · Eat less fat and fewer fried foods  Eat more baked or grilled chicken and fish  These protein sources are lower in calories and fat than red meat  Limit fast food   Dress your salads with olive oil and vinegar instead of bottled dressing  · Limit the amount of sugar you eat  Do not drink sugary beverages  Limit alcohol  Activity changes:  Physical activity is good for your body in many ways  It helps you burn calories and build strong muscles  It decreases stress and depression, and improves your mood  It can also help you sleep better  Talk to your healthcare provider before you begin an exercise program   · Exercise for at least 30 minutes 5 days a week  Start slowly  Set aside time each day for physical activity that you enjoy and that is convenient for you  It is best to do both weight training and an activity that increases your heart rate, such as walking, bicycling, or swimming  · Find ways to be more active  Do yard work and housecleaning  Walk up the stairs instead of using elevators  Spend your leisure time going to events that require walking, such as outdoor festivals or fairs  This extra physical activity can help you lose weight and keep it off  Follow up with your healthcare provider as directed: You may need to meet with a dietitian  Write down your questions so you remember to ask them during your visits  © Copyright [employer] 2021 Information is for End User's use only and may not be sold, redistributed or otherwise used for commercial purposes  All illustrations and images included in CareNotes® are the copyrighted property of A D A M , Inc  or [facility] Cathleen Damon   The above information is an  only  It is not intended as medical advice for individual conditions or treatments  Talk to your doctor, nurse or pharmacist before following any medical regimen to see if it is safe and effective for you  Weight Management   AMBULATORY CARE:   Why it is important to manage your weight:  Being overweight increases your risk of health conditions such as heart disease, high blood pressure, type 2 diabetes, and certain types of cancer   It can also increase your risk for osteoarthritis, sleep apnea, and other respiratory problems  Aim for a slow, steady weight loss  Even a small amount of weight loss can lower your risk of health problems  How to lose weight safely:  A safe and healthy way to lose weight is to eat fewer calories and get regular exercise  · You can lose up about 1 pound a week by decreasing the number of calories you eat by 500 calories each day  You can decrease calories by eating smaller portion sizes or by cutting out high-calorie foods  Read labels to find out how many calories are in the foods you eat  · You can also burn calories with exercise such as walking, swimming, or biking  You will be more likely to keep weight off if you make these changes part of your lifestyle  Exercise at least 30 minutes per day on most days of the week  You can also fit in more physical activity by taking the stairs instead of the elevator or parking farther away from stores  Ask your healthcare provider about the best exercise plan for you  Healthy meal plan for weight management:  A healthy meal plan includes a variety of foods, contains fewer calories, and helps you stay healthy  A healthy meal plan includes the following:     · Eat whole-grain foods more often  A healthy meal plan should contain fiber  Fiber is the part of grains, fruits, and vegetables that is not broken down by your body  Whole-grain foods are healthy and provide extra fiber in your diet  Some examples of whole-grain foods are whole-wheat breads and pastas, oatmeal, brown rice, and bulgur  · Eat a variety of vegetables every day  Include dark, leafy greens such as spinach, kale, tg greens, and mustard greens  Eat yellow and orange vegetables such as carrots, sweet potatoes, and winter squash  · Eat a variety of fruits every day  Choose fresh or canned fruit (canned in its own juice or light syrup) instead of juice  Fruit juice has very little or no fiber      · Eat low-fat dairy foods  Drink fat-free (skim) milk or 1% milk  Eat fat-free yogurt and low-fat cottage cheese  Try low-fat cheeses such as mozzarella and other reduced-fat cheeses  · Choose meat and other protein foods that are low in fat  Choose beans or other legumes such as split peas or lentils  Choose fish, skinless poultry (chicken or turkey), or lean cuts of red meat (beef or pork)  Before you cook meat or poultry, cut off any visible fat  · Use less fat and oil  Try baking foods instead of frying them  Add less fat, such as margarine, sour cream, regular salad dressing and mayonnaise to foods  Eat fewer high-fat foods  Some examples of high-fat foods include french fries, doughnuts, ice cream, and cakes  · Eat fewer sweets  Limit foods and drinks that are high in sugar  This includes candy, cookies, regular soda, and sweetened drinks  Ways to decrease calories:   · Eat smaller portions  ? Use a small plate with smaller servings  ? Do not eat second helpings  ? When you eat at a restaurant, ask for a box and place half of your meal in the box before you eat  ? Share an entrée with someone else  · Replace high-calorie snacks with healthy, low-calorie snacks  ? Choose fresh fruit, vegetables, fat-free rice cakes, or air-popped popcorn instead of potato chips, nuts, or chocolate  ? Choose water or calorie-free drinks instead of soda or sweetened drinks  · Do not shop for groceries when you are hungry  You may be more likely to make unhealthy food choices  Take a grocery list of healthy foods and shop after you have eaten  · Eat regular meals  Do not skip meals  Skipping meals can lead to overeating later in the day  This can make it harder for you to lose weight  Eat a healthy snack in place of a meal if you do not have time to eat a regular meal  Talk with a dietitian to help you create a meal plan and schedule that is right for you      Other things to consider as you try to lose weight:   · Be aware of situations that may give you the urge to overeat, such as eating while watching television  Find ways to avoid these situations  For example, read a book, go for a walk, or do crafts  · Meet with a weight loss support group or friends who are also trying to lose weight  This may help you stay motivated to continue working on your weight loss goals  © Copyright [employer] 2021 Information is for End User's use only and may not be sold, redistributed or otherwise used for commercial purposes  All illustrations and images included in CareNotes® are the copyrighted property of A D A M , Inc  or [facility] Cathleen Damon   The above information is an  only  It is not intended as medical advice for individual conditions or treatments  Talk to your doctor, nurse or pharmacist before following any medical regimen to see if it is safe and effective for you

## 2021-09-02 ENCOUNTER — TELEPHONE (OUTPATIENT)
Dept: ADMINISTRATIVE | Facility: OTHER | Age: 61
End: 2021-09-02

## 2021-09-02 NOTE — TELEPHONE ENCOUNTER
----- Message from Hernando Doan DO sent at 9/1/2021  1:14 PM EDT -----  Regarding: cervical cancer screen  Had cervical cancer screening in July 2021  At [facility] in [employer]

## 2021-09-02 NOTE — LETTER
Procedure Request Form: Cervical Cancer Screening      Date Requested: 21  Patient: Merl Model  Patient : [id number]   Referring Provider: Fernandez [place], DO        Date of Procedure ______________________________       The above patient has informed us that they have completed their   most recent Cervical Cancer Screening at your facility  Please complete   this form and attach all corresponding procedure reports/results  Comments __________________________________________________________  ____________________________________________________________________  ____________________________________________________________________  ____________________________________________________________________    Facility Completing Procedure _________________________________________    Form Completed By (print name) _______________________________________      Signature __________________________________________________________      These reports are needed for  compliance    Please fax this completed form and a copy of the procedure report to our office located at [place] [address] as soon as possible to [phone] attention Alisonis Friend: Phone [phone]    We thank you for your assistance in treating our mutual patient

## 2021-09-02 NOTE — TELEPHONE ENCOUNTER
Upon review of the In Basket request and the patient's chart, initial outreach has been made via fax, please see Contacts section for details       Thank you  Yuri Nunn

## 2021-09-07 NOTE — TELEPHONE ENCOUNTER
As a follow-up, a second attempt has been made for outreach via fax, please see Contacts section for details      Thank you  Deedee Wood

## 2021-09-15 NOTE — TELEPHONE ENCOUNTER
Upon review of the In Basket request we were able to locate, review, and update the patient chart as requested for Pap Smear (HPV) aka Cervical Cancer Screening  Any additional questions or concerns should be emailed to the Practice Liaisons via [email or web address] email, please do not reply via In Basket      Thank you  Genoveva Orr

## 2021-09-15 NOTE — TELEPHONE ENCOUNTER
As a final attempt, a third outreach has been made via telephone call  Please see Contacts section for details  This encounter will be closed and completed by end of day  Should we receive the requested information because of previous outreach attempts, the requested patient's chart will be updated appropriately       Thank you  Bjorn Paniagua

## 2021-09-20 ENCOUNTER — IMMUNIZATIONS (OUTPATIENT)
Dept: FAMILY MEDICINE CLINIC | Facility: CLINIC | Age: 61
End: 2021-09-20
Payer: COMMERCIAL

## 2021-09-20 DIAGNOSIS — Z23 NEED FOR VACCINATION: Primary | ICD-10-CM

## 2021-09-20 PROCEDURE — 90471 IMMUNIZATION ADMIN: CPT

## 2021-09-20 PROCEDURE — 90682 RIV4 VACC RECOMBINANT DNA IM: CPT

## 2021-10-11 ENCOUNTER — OFFICE VISIT (OUTPATIENT)
Dept: FAMILY MEDICINE CLINIC | Facility: CLINIC | Age: 61
End: 2021-10-11
Payer: COMMERCIAL

## 2021-10-11 ENCOUNTER — APPOINTMENT (OUTPATIENT)
Dept: RADIOLOGY | Facility: CLINIC | Age: 61
End: 2021-10-11
Payer: COMMERCIAL

## 2021-10-11 VITALS
SYSTOLIC BLOOD PRESSURE: 132 MMHG | DIASTOLIC BLOOD PRESSURE: 84 MMHG | TEMPERATURE: 99.4 F | RESPIRATION RATE: 20 BRPM | HEIGHT: 63 IN | WEIGHT: 198.8 LBS | OXYGEN SATURATION: 97 % | BODY MASS INDEX: 35.22 KG/M2 | HEART RATE: 81 BPM

## 2021-10-11 DIAGNOSIS — M25.552 HIP PAIN, CHRONIC, LEFT: ICD-10-CM

## 2021-10-11 DIAGNOSIS — G89.29 HIP PAIN, CHRONIC, LEFT: Primary | ICD-10-CM

## 2021-10-11 DIAGNOSIS — M25.552 HIP PAIN, CHRONIC, LEFT: Primary | ICD-10-CM

## 2021-10-11 DIAGNOSIS — G89.29 HIP PAIN, CHRONIC, LEFT: ICD-10-CM

## 2021-10-11 PROCEDURE — 73502 X-RAY EXAM HIP UNI 2-3 VIEWS: CPT

## 2021-10-11 PROCEDURE — 72110 X-RAY EXAM L-2 SPINE 4/>VWS: CPT

## 2021-10-11 PROCEDURE — 99213 OFFICE O/P EST LOW 20 MIN: CPT | Performed by: NURSE PRACTITIONER

## 2021-10-11 RX ORDER — NAPROXEN 500 MG/1
500 TABLET ORAL 2 TIMES DAILY WITH MEALS
Qty: 20 TABLET | Refills: 0 | Status: SHIPPED | OUTPATIENT
Start: 2021-10-11 | End: 2021-10-25

## 2021-10-11 RX ORDER — METHOCARBAMOL 750 MG/1
750 TABLET, FILM COATED ORAL EVERY 6 HOURS PRN
Qty: 30 TABLET | Refills: 0 | Status: SHIPPED | OUTPATIENT
Start: 2021-10-11 | End: 2022-02-28

## 2021-10-18 ENCOUNTER — TELEPHONE (OUTPATIENT)
Dept: FAMILY MEDICINE CLINIC | Facility: CLINIC | Age: 61
End: 2021-10-18

## 2021-10-18 DIAGNOSIS — G89.29 HIP PAIN, CHRONIC, LEFT: Primary | ICD-10-CM

## 2021-10-18 DIAGNOSIS — M25.552 HIP PAIN, CHRONIC, LEFT: Primary | ICD-10-CM

## 2021-10-23 LAB
ALBUMIN SERPL-MCNC: 4.6 G/DL (ref 3.8–4.9)
ALBUMIN/GLOB SERPL: 2.1 {RATIO} (ref 1.2–2.2)
ALP SERPL-CCNC: 57 IU/L (ref 44–121)
ALT SERPL-CCNC: 26 IU/L (ref 0–32)
APPEARANCE UR: CLEAR
AST SERPL-CCNC: 22 IU/L (ref 0–40)
BACTERIA URNS QL MICRO: NORMAL
BILIRUB SERPL-MCNC: 0.5 MG/DL (ref 0–1.2)
BILIRUB UR QL STRIP: NEGATIVE
BUN SERPL-MCNC: 23 MG/DL (ref 6–24)
BUN/CREAT SERPL: 23 (ref 9–23)
CALCIUM SERPL-MCNC: 9.9 MG/DL (ref 8.7–10.2)
CASTS URNS QL MICRO: NORMAL /LPF
CHLORIDE SERPL-SCNC: 106 MMOL/L (ref 96–106)
CHOLEST SERPL-MCNC: 182 MG/DL (ref 100–199)
CHOLEST/HDLC SERPL: 3.4 RATIO (ref 0–4.4)
CO2 SERPL-SCNC: 22 MMOL/L (ref 20–29)
COLOR UR: YELLOW
CREAT SERPL-MCNC: 1.02 MG/DL (ref 0.57–1)
EPI CELLS #/AREA URNS HPF: NORMAL /HPF (ref 0–10)
ERYTHROCYTE [DISTWIDTH] IN BLOOD BY AUTOMATED COUNT: 12.2 % (ref 11.7–15.4)
GLOBULIN SER-MCNC: 2.2 G/DL (ref 1.5–4.5)
GLUCOSE SERPL-MCNC: 117 MG/DL (ref 65–99)
GLUCOSE UR QL: NEGATIVE
HCT VFR BLD AUTO: 43.7 % (ref 34–46.6)
HDLC SERPL-MCNC: 53 MG/DL
HGB BLD-MCNC: 15 G/DL (ref 11.1–15.9)
HGB UR QL STRIP: NEGATIVE
KETONES UR QL STRIP: NEGATIVE
LDLC SERPL CALC-MCNC: 111 MG/DL (ref 0–99)
LEUKOCYTE ESTERASE UR QL STRIP: ABNORMAL
MCH RBC QN AUTO: 31.3 PG (ref 26.6–33)
MCHC RBC AUTO-ENTMCNC: 34.3 G/DL (ref 31.5–35.7)
MCV RBC AUTO: 91 FL (ref 79–97)
MICRO URNS: ABNORMAL
NITRITE UR QL STRIP: NEGATIVE
PH UR STRIP: 7 [PH] (ref 5–7.5)
PLATELET # BLD AUTO: 176 X10E3/UL (ref 150–450)
POTASSIUM SERPL-SCNC: 4.5 MMOL/L (ref 3.5–5.2)
PROT SERPL-MCNC: 6.8 G/DL (ref 6–8.5)
PROT UR QL STRIP: NEGATIVE
RBC # BLD AUTO: 4.79 X10E6/UL (ref 3.77–5.28)
RBC #/AREA URNS HPF: NORMAL /HPF (ref 0–2)
SL AMB EGFR AFRICAN AMERICAN: 70 ML/MIN/1.73
SL AMB EGFR NON AFRICAN AMERICAN: 60 ML/MIN/1.73
SL AMB VLDL CHOLESTEROL CALC: 18 MG/DL (ref 5–40)
SODIUM SERPL-SCNC: 141 MMOL/L (ref 134–144)
SP GR UR: 1.02 (ref 1–1.03)
TRIGL SERPL-MCNC: 97 MG/DL (ref 0–149)
UROBILINOGEN UR STRIP-ACNC: 0.2 MG/DL (ref 0.2–1)
WBC # BLD AUTO: 4.3 X10E3/UL (ref 3.4–10.8)
WBC #/AREA URNS HPF: NORMAL /HPF (ref 0–5)

## 2021-10-25 ENCOUNTER — OFFICE VISIT (OUTPATIENT)
Dept: FAMILY MEDICINE CLINIC | Facility: CLINIC | Age: 61
End: 2021-10-25
Payer: COMMERCIAL

## 2021-10-25 VITALS
RESPIRATION RATE: 18 BRPM | DIASTOLIC BLOOD PRESSURE: 80 MMHG | HEIGHT: 63 IN | SYSTOLIC BLOOD PRESSURE: 140 MMHG | BODY MASS INDEX: 35.05 KG/M2 | WEIGHT: 197.8 LBS | OXYGEN SATURATION: 98 % | HEART RATE: 88 BPM | TEMPERATURE: 98.8 F

## 2021-10-25 DIAGNOSIS — M54.42 CHRONIC LEFT-SIDED LOW BACK PAIN WITH LEFT-SIDED SCIATICA: Primary | ICD-10-CM

## 2021-10-25 DIAGNOSIS — G89.29 CHRONIC LEFT-SIDED LOW BACK PAIN WITH LEFT-SIDED SCIATICA: Primary | ICD-10-CM

## 2021-10-25 PROCEDURE — 3008F BODY MASS INDEX DOCD: CPT | Performed by: NURSE PRACTITIONER

## 2021-10-25 PROCEDURE — 3725F SCREEN DEPRESSION PERFORMED: CPT | Performed by: NURSE PRACTITIONER

## 2021-10-25 PROCEDURE — 1036F TOBACCO NON-USER: CPT | Performed by: NURSE PRACTITIONER

## 2021-10-25 PROCEDURE — 99213 OFFICE O/P EST LOW 20 MIN: CPT | Performed by: NURSE PRACTITIONER

## 2021-10-25 RX ORDER — PREDNISONE 20 MG/1
20 TABLET ORAL DAILY
Qty: 20 TABLET | Refills: 0 | Status: SHIPPED | OUTPATIENT
Start: 2021-10-25 | End: 2022-02-28

## 2021-10-25 RX ORDER — ZINC OXIDE 13 %
CREAM (GRAM) TOPICAL
COMMUNITY

## 2021-11-09 ENCOUNTER — CONSULT (OUTPATIENT)
Dept: PAIN MEDICINE | Facility: CLINIC | Age: 61
End: 2021-11-09
Payer: COMMERCIAL

## 2021-11-09 VITALS
SYSTOLIC BLOOD PRESSURE: 128 MMHG | BODY MASS INDEX: 34.91 KG/M2 | HEART RATE: 93 BPM | TEMPERATURE: 98.2 F | DIASTOLIC BLOOD PRESSURE: 80 MMHG | WEIGHT: 197 LBS | HEIGHT: 63 IN

## 2021-11-09 DIAGNOSIS — G89.29 HIP PAIN, CHRONIC, LEFT: ICD-10-CM

## 2021-11-09 DIAGNOSIS — M47.816 LUMBAR SPONDYLOSIS: ICD-10-CM

## 2021-11-09 DIAGNOSIS — M54.16 LEFT LUMBAR RADICULOPATHY: Primary | ICD-10-CM

## 2021-11-09 DIAGNOSIS — F33.9 DEPRESSION, RECURRENT (HCC): ICD-10-CM

## 2021-11-09 DIAGNOSIS — M25.552 HIP PAIN, CHRONIC, LEFT: ICD-10-CM

## 2021-11-09 PROCEDURE — 99204 OFFICE O/P NEW MOD 45 MIN: CPT | Performed by: ANESTHESIOLOGY

## 2021-11-09 PROCEDURE — 1036F TOBACCO NON-USER: CPT | Performed by: ANESTHESIOLOGY

## 2021-11-27 ENCOUNTER — HOSPITAL ENCOUNTER (OUTPATIENT)
Dept: MRI IMAGING | Facility: HOSPITAL | Age: 61
Discharge: HOME | End: 2021-11-27
Attending: ANESTHESIOLOGY
Payer: COMMERCIAL

## 2021-11-27 DIAGNOSIS — M54.16 LEFT LUMBAR RADICULOPATHY: ICD-10-CM

## 2021-11-27 PROCEDURE — 72148 MRI LUMBAR SPINE W/O DYE: CPT

## 2021-12-01 ENCOUNTER — TELEPHONE (OUTPATIENT)
Dept: PAIN MEDICINE | Facility: CLINIC | Age: 61
End: 2021-12-01

## 2021-12-01 DIAGNOSIS — M48.061 LUMBAR FORAMINAL STENOSIS: Primary | ICD-10-CM

## 2021-12-01 DIAGNOSIS — M54.16 LUMBAR RADICULITIS: ICD-10-CM

## 2021-12-09 ENCOUNTER — HOSPITAL ENCOUNTER (OUTPATIENT)
Dept: RADIOLOGY | Facility: CLINIC | Age: 61
Discharge: HOME | End: 2021-12-09
Attending: ANESTHESIOLOGY | Admitting: ANESTHESIOLOGY
Payer: COMMERCIAL

## 2021-12-09 VITALS
OXYGEN SATURATION: 96 % | HEART RATE: 75 BPM | DIASTOLIC BLOOD PRESSURE: 81 MMHG | SYSTOLIC BLOOD PRESSURE: 146 MMHG | RESPIRATION RATE: 20 BRPM | TEMPERATURE: 97.2 F

## 2021-12-09 DIAGNOSIS — M54.16 LUMBAR RADICULITIS: ICD-10-CM

## 2021-12-09 DIAGNOSIS — M48.061 LUMBAR FORAMINAL STENOSIS: ICD-10-CM

## 2021-12-09 PROCEDURE — 62323 NJX INTERLAMINAR LMBR/SAC: CPT | Performed by: ANESTHESIOLOGY

## 2021-12-09 RX ORDER — METHYLPREDNISOLONE ACETATE 80 MG/ML
80 INJECTION, SUSPENSION INTRA-ARTICULAR; INTRALESIONAL; INTRAMUSCULAR; PARENTERAL; SOFT TISSUE ONCE
Status: COMPLETED | OUTPATIENT
Start: 2021-12-09 | End: 2021-12-09

## 2021-12-09 RX ADMIN — METHYLPREDNISOLONE ACETATE 80 MG: 80 INJECTION, SUSPENSION INTRA-ARTICULAR; INTRALESIONAL; INTRAMUSCULAR; SOFT TISSUE at 10:49

## 2021-12-09 RX ADMIN — IOHEXOL 1 ML: 300 INJECTION, SOLUTION INTRAVENOUS at 10:48

## 2021-12-16 ENCOUNTER — TELEPHONE (OUTPATIENT)
Dept: PAIN MEDICINE | Facility: CLINIC | Age: 61
End: 2021-12-16

## 2022-01-11 ENCOUNTER — TELEPHONE (OUTPATIENT)
Dept: RADIOLOGY | Facility: CLINIC | Age: 62
End: 2022-01-11

## 2022-01-20 ENCOUNTER — HOSPITAL ENCOUNTER (OUTPATIENT)
Dept: RADIOLOGY | Facility: CLINIC | Age: 62
Discharge: HOME | End: 2022-01-20
Attending: ANESTHESIOLOGY | Admitting: ANESTHESIOLOGY
Payer: COMMERCIAL

## 2022-01-20 VITALS
RESPIRATION RATE: 18 BRPM | TEMPERATURE: 97.3 F | DIASTOLIC BLOOD PRESSURE: 73 MMHG | SYSTOLIC BLOOD PRESSURE: 135 MMHG | OXYGEN SATURATION: 96 % | HEART RATE: 72 BPM

## 2022-01-20 DIAGNOSIS — M54.16 LUMBAR RADICULITIS: ICD-10-CM

## 2022-01-20 DIAGNOSIS — M48.061 LUMBAR FORAMINAL STENOSIS: ICD-10-CM

## 2022-01-20 PROCEDURE — 62323 NJX INTERLAMINAR LMBR/SAC: CPT | Performed by: ANESTHESIOLOGY

## 2022-01-20 RX ORDER — METHYLPREDNISOLONE ACETATE 80 MG/ML
80 INJECTION, SUSPENSION INTRA-ARTICULAR; INTRALESIONAL; INTRAMUSCULAR; PARENTERAL; SOFT TISSUE ONCE
Status: COMPLETED | OUTPATIENT
Start: 2022-01-20 | End: 2022-01-20

## 2022-01-20 RX ADMIN — IOHEXOL 1 ML: 300 INJECTION, SOLUTION INTRAVENOUS at 11:02

## 2022-01-20 RX ADMIN — METHYLPREDNISOLONE ACETATE 80 MG: 80 INJECTION, SUSPENSION INTRA-ARTICULAR; INTRALESIONAL; INTRAMUSCULAR; SOFT TISSUE at 11:02

## 2022-01-20 NOTE — H&P
History of Present Illness: The patient is a 63 y o  female who presents with complaints of low back and leg pain      Patient Active Problem List   Diagnosis    GERD without esophagitis    Hyperlipidemia    Other depressive disorder    Hallux rigidus    Lumbar foraminal stenosis    Lumbar radiculitis       Past Medical History:   Diagnosis Date    Abnormal mammogram     RESOLVED [id number]    Arthritis     Depression     GERD (gastroesophageal reflux disease)     diet managed, occasional    Hallux rigidus     LAST ASSESSED [id number] L grreat toe    Hyperlipidemia     Lyme disease     LAST ASSESSED [id number]    Murmur, cardiac     functional    PONV (postoperative nausea and vomiting)     Trigger finger     ,r hand ring finger and middle; L same fingers    Wears glasses        Past Surgical History:   Procedure Laterality Date    COLONOSCOPY      LAST ASSESSED [id number]    DILATION AND CURETTAGE OF UTERUS      ENDOMETRIAL ABLATION      ENDOMETRIAL CRYOABLATION      WITH ULTRASOUND GUIDE     KNEE ARTHROSCOPY      b/l meniscus    IA FUSION BIG TOE,MT-P JT Left 1/18/2019    Procedure: GREAT TOE FUSION WITH PLATE;  Surgeon: Mery Degroot DPM;  Location: [facility] OR;  Service: Podiatry    TONSILLECTOMY AND ADENOIDECTOMY           Current Outpatient Medications:     atorvastatin (LIPITOR) 40 mg tablet, Take 1 tablet (40 mg total) by mouth daily at bedtime, Disp: 90 tablet, Rfl: 1    Calcium Carbonate-Vitamin D (CALCIUM 600+D PO), Take 1 tablet by mouth daily, Disp: , Rfl:     methocarbamol (ROBAXIN) 750 mg tablet, Take 1 tablet (750 mg total) by mouth every 6 (six) hours as needed for muscle spasms, Disp: 30 tablet, Rfl: 0    multivitamin (THERAGRAN) TABS, Take 1 tablet by mouth daily at bedtime  , Disp: , Rfl:     predniSONE 20 mg tablet, Take 1 tablet (20 mg total) by mouth daily 3 tabs x's 3 days, 2 tabs x's 3 days, 1 tab x's 3 days 1/2 tab x's 3 days, Disp: 20 tablet, Rfl: 0    Probiotic Product (Probiotic Daily) CAPS, Take by mouth, Disp: , Rfl:     Current Facility-Administered Medications:     iohexol (OMNIPAQUE) 300 mg/mL injection 50 mL, 50 mL, Epidural, Once, Juan Green DO    methylPREDNISolone acetate (DEPO-MEDROL) injection 80 mg, 80 mg, Epidural, Once, Juan Green DO    No Known Allergies    Physical Exam:   Vitals:    01/20/22 1046   BP: 139/83   Pulse: 68   Resp: 20   Temp: (!) 97 3 °F (36 3 °C)   SpO2: 97%     General: Awake, Alert, Oriented x 3, Mood and affect appropriate  Respiratory: Respirations even and unlabored  Cardiovascular: Peripheral pulses intact; no edema  Musculoskeletal Exam:  Normal gait and station    ASA Score: II    Patient/Chart Verification  Patient ID Verified: Verbal  ID Band Applied: No  Consents Confirmed: Procedural  H&P( within 30 days) Verified: To be obtained in the Pre-Procedure area  Interval H&P(within 24 hr) Complete (required for Outpatients and Surgery Admit only): To be obtained in the Pre-Procedure area  Allergies Reviewed: Yes  Anticoag/NSAID held?: No  Currently on antibiotics?: No  Pre-op Lab/Test Results Available: N/A  Pregnancy Lab Collected: N/A comment    Assessment:   1  Lumbar foraminal stenosis    2   Lumbar radiculitis        Plan: NAZIA

## 2022-01-20 NOTE — DISCHARGE INSTRUCTIONS
Epidural Steroid Injection   WHAT YOU NEED TO KNOW:   An epidural steroid injection (KOBE) is a procedure to inject steroid medicine into the epidural space  The epidural space is between your spinal cord and vertebrae  Steroids reduce inflammation and fluid buildup in your spine that may be causing pain  You may be given pain medicine along with the steroids  ACTIVITY  · Do not drive or operate machinery today  · No strenuous activity today - bending, lifting, etc   · You may resume normal activites starting tomorrow - start slowly and as tolerated  · You may shower today, but no tub baths or hot tubs  · You may have numbness for several hours from the local anesthetic  Please use caution and common sense, especially with weight-bearing activities  CARE OF THE INJECTION SITE  · If you have soreness or pain, apply ice to the area today (20 minutes on/20 minutes off)  · Starting tomorrow, you may use warm, moist heat or ice if needed  · You may have an increase or change in your discomfort for 36-48 hours after your treatment  · Apply ice and continue with any pain medication you have been prescribed  · Notify the [facility] if you have any of the following: redness, drainage, swelling, headache, stiff neck or fever above 100°F     SPECIAL INSTRUCTIONS  · Our office will contact you in approximately 7 days for a progress report  MEDICATIONS  · Continue to take all routine medications  · Our office may have instructed you to hold some medications  As no general anesthesia was used in today's procedure, you should not experience any side effects related to anesthesia  If you have a problem specifically related to your procedure, please call our office at [phone]  Problems not related to your procedure should be directed to your primary care physician

## 2022-01-27 ENCOUNTER — TELEPHONE (OUTPATIENT)
Dept: PAIN MEDICINE | Facility: CLINIC | Age: 62
End: 2022-01-27

## 2022-02-07 DIAGNOSIS — E78.5 HYPERLIPIDEMIA, UNSPECIFIED HYPERLIPIDEMIA TYPE: ICD-10-CM

## 2022-02-07 RX ORDER — ATORVASTATIN CALCIUM 40 MG/1
40 TABLET, FILM COATED ORAL
Qty: 90 TABLET | Refills: 1 | Status: SHIPPED | OUTPATIENT
Start: 2022-02-07 | End: 2022-07-25

## 2022-02-28 ENCOUNTER — OFFICE VISIT (OUTPATIENT)
Dept: PAIN MEDICINE | Facility: CLINIC | Age: 62
End: 2022-02-28
Payer: COMMERCIAL

## 2022-02-28 VITALS
SYSTOLIC BLOOD PRESSURE: 128 MMHG | BODY MASS INDEX: 35.26 KG/M2 | WEIGHT: 199 LBS | HEART RATE: 74 BPM | HEIGHT: 63 IN | TEMPERATURE: 97.9 F | DIASTOLIC BLOOD PRESSURE: 78 MMHG

## 2022-02-28 DIAGNOSIS — M48.061 LUMBAR FORAMINAL STENOSIS: ICD-10-CM

## 2022-02-28 DIAGNOSIS — M54.16 LUMBAR RADICULITIS: Primary | ICD-10-CM

## 2022-02-28 PROCEDURE — 99213 OFFICE O/P EST LOW 20 MIN: CPT | Performed by: NURSE PRACTITIONER

## 2022-02-28 NOTE — PROGRESS NOTES
Assessment:  1  Lumbar radiculitis    2  Lumbar foraminal stenosis        Plan:  I discussed with the patient that since there has been moderate-significant improvement in the pain symptoms that we will hold off on any repeat injections at this point in time  However, I did explain that if over the next 12-16 weeks the pain symptoms should return, worsen, and/or change, we could consider a repeat injection  If after the 16 weeks and OV may be warranted for re-evaluation  The patient was agreeable and verbalized an understanding  I encouraged the patient to slowly and steadily increase her activity, as tolerated, reminding her to allow pain to be her guide  The patient was agreeable and verbalized an understanding  I discussed with the patient that at this point time she can followup with our office on an as-needed basis  I did review the patient that if her pain symptoms should change, worsen, and/or if she would experience any new symptoms as she would like to be evaluated for, she should give our office a call  The patient was agreeable and verbalized an understanding  History of Present Illness: The patient is a 63 y o  female last seen on 1/20/2022 who presents for a follow up office visit in regards to lumbar radiculopathy secondary to lumbar stenosis  The patient currently reports that since her last office visit as she is status post her 2nd L5-S1 interlaminar lumbar epidural steroid injection on January 20, 2022 with Dr Antonino Shah, that she is noting at least 80% overall improvement of her chronic low back and leg pain  The patient reports that occasionally she does still have some left hip pain, but that it is minimal and intermittent and overall things are going well  She presents today for regular postprocedure follow-up visit      I have personally reviewed and/or updated the patient's past medical history, past surgical history, family history, social history, current medications, allergies, and vital signs today         Review of Systems:    Review of Systems      Past Medical History:   Diagnosis Date    Abnormal mammogram     RESOLVED [id number]    Arthritis     Depression     GERD (gastroesophageal reflux disease)     diet managed, occasional    Hallux rigidus     LAST ASSESSED [id number] L grreat toe    Hyperlipidemia     Lyme disease     LAST ASSESSED [id number]    Murmur, cardiac     functional    PONV (postoperative nausea and vomiting)     Trigger finger     ,r hand ring finger and middle; L same fingers    Wears glasses        Past Surgical History:   Procedure Laterality Date    COLONOSCOPY      LAST ASSESSED [id number]    DILATION AND CURETTAGE OF UTERUS      ENDOMETRIAL ABLATION      ENDOMETRIAL CRYOABLATION      WITH ULTRASOUND GUIDE     KNEE ARTHROSCOPY      b/l meniscus    ME FUSION BIG TOE,MT-P JT Left 1/18/2019    Procedure: GREAT TOE FUSION WITH PLATE;  Surgeon: Sia Colindres DPM;  Location: [facility];  Service: Podiatry    TONSILLECTOMY AND ADENOIDECTOMY         Family History   Problem Relation Age of Onset    Heart disease Mother     Colon polyps Mother     Hypertension Father     Kidney failure Father     Coronary artery disease Family     Hypertension Family     Lung cancer Sister 61    No Known Problems Daughter     Breast cancer Maternal Grandmother 36    Breast cancer Paternal Grandmother [de-identified]    Breast cancer Maternal Aunt     No Known Problems Maternal Aunt     No Known Problems Maternal Aunt     No Known Problems Maternal Aunt     No Known Problems Paternal Aunt     No Known Problems Sister     No Known Problems Maternal Grandfather     No Known Problems Paternal Grandfather     No Known Problems Son        Social History     Occupational History    Occupation: Office work     Comment: Part-time   Tobacco Use    Smoking status: Never Smoker    Smokeless tobacco: Never Used   Vaping Use    Vaping Use: Never used Substance and Sexual Activity    Alcohol use: Not Currently     Comment: Very seldom    Drug use: No    Sexual activity: Yes     Partners: Male     Birth control/protection: None         Current Outpatient Medications:     atorvastatin (LIPITOR) 40 mg tablet, Take 1 tablet (40 mg total) by mouth daily at bedtime, Disp: 90 tablet, Rfl: 1    Calcium Carbonate-Vitamin D (CALCIUM 600+D PO), Take 1 tablet by mouth daily, Disp: , Rfl:     multivitamin (THERAGRAN) TABS, Take 1 tablet by mouth daily at bedtime  , Disp: , Rfl:     Probiotic Product (Probiotic Daily) CAPS, Take by mouth, Disp: , Rfl:     No Known Allergies    Physical Exam:    /78 (BP Location: Left arm, Patient Position: Sitting, Cuff Size: Standard)   Pulse 74   Temp 97 9 °F (36 6 °C)   Ht 5' 3" (1 6 m)   Wt 90 3 kg (199 lb)   LMP  (LMP Unknown)   BMI 35 25 kg/m²     Constitutional:normal, well developed, well nourished, alert, in no distress and non-toxic and no overt pain behavior  Eyes:anicteric  HEENT:grossly intact  Neck:supple, symmetric, trachea midline and no masses   Pulmonary:even and unlabored  Cardiovascular:No edema or pitting edema present  Skin:Normal without rashes or lesions and well hydrated  Psychiatric:Mood and affect appropriate  Neurologic:Cranial Nerves II-XII grossly intact  Musculoskeletal:normal      Imaging  No orders to display         No orders of the defined types were placed in this encounter

## 2022-03-08 ENCOUNTER — OFFICE VISIT (OUTPATIENT)
Dept: FAMILY MEDICINE CLINIC | Facility: CLINIC | Age: 62
End: 2022-03-08
Payer: COMMERCIAL

## 2022-03-08 VITALS
RESPIRATION RATE: 18 BRPM | WEIGHT: 198 LBS | DIASTOLIC BLOOD PRESSURE: 88 MMHG | SYSTOLIC BLOOD PRESSURE: 134 MMHG | HEART RATE: 74 BPM | BODY MASS INDEX: 35.08 KG/M2 | TEMPERATURE: 98.3 F | HEIGHT: 63 IN | OXYGEN SATURATION: 98 %

## 2022-03-08 DIAGNOSIS — M65.332 TRIGGER MIDDLE FINGER OF LEFT HAND: ICD-10-CM

## 2022-03-08 DIAGNOSIS — M65.341 TRIGGER RING FINGER OF RIGHT HAND: ICD-10-CM

## 2022-03-08 DIAGNOSIS — M65.342 TRIGGER RING FINGER OF LEFT HAND: ICD-10-CM

## 2022-03-08 DIAGNOSIS — M65.331 TRIGGER MIDDLE FINGER OF RIGHT HAND: Primary | ICD-10-CM

## 2022-03-08 PROCEDURE — 99213 OFFICE O/P EST LOW 20 MIN: CPT | Performed by: FAMILY MEDICINE

## 2022-03-08 NOTE — PROGRESS NOTES
Assessment/Plan:       Diagnoses and all orders for this visit:    Trigger middle finger of right hand  -     Ambulatory referral to Orthopedic Surgery; Future    Trigger ring finger of right hand  -     Ambulatory referral to Orthopedic Surgery; Future    Trigger middle finger of left hand  -     Ambulatory referral to Orthopedic Surgery; Future    Trigger ring finger of left hand  -     Ambulatory referral to Orthopedic Surgery; Future      patient referred to hand ortho  F/u as needed    Subjective:     Chief Complaint   Patient presents with    Trigger finger     b/l hands  Worse in left hand  Has had injection in past  Needs referral, most likely interested in surgery at this point  Patient ID: Jed Raya is a 63 y o  female  Patient is here for trigger fingers   She had similar issues in the past and saw hand ortho and would like to see again      The following portions of the patient's history were reviewed and updated as appropriate: allergies, current medications, past family history, past medical history, past social history, past surgical history and problem list     Review of Systems   Constitutional: Negative  HENT: Negative  Eyes: Negative  Respiratory: Negative  Cardiovascular: Negative  Gastrointestinal: Negative  Endocrine: Negative  Genitourinary: Negative  Musculoskeletal: Positive for arthralgias  Skin: Negative  Allergic/Immunologic: Negative  Neurological: Negative  Hematological: Negative  Psychiatric/Behavioral: Negative  All other systems reviewed and are negative  Objective:    Vitals:    03/08/22 1042   BP: 134/88   BP Location: Left arm   Patient Position: Sitting   Cuff Size: Standard   Pulse: 74   Resp: 18   Temp: 98 3 °F (36 8 °C)   TempSrc: Tympanic   SpO2: 98%   Weight: 89 8 kg (198 lb)   Height: 5' 3" (1 6 m)          Physical Exam  Vitals and nursing note reviewed     Constitutional:       Appearance: She is well-developed  HENT:      Head: Normocephalic and atraumatic  Right Ear: External ear normal       Left Ear: External ear normal    Eyes:      Conjunctiva/sclera: Conjunctivae normal       Pupils: Pupils are equal, round, and reactive to light  Cardiovascular:      Rate and Rhythm: Normal rate and regular rhythm  Heart sounds: Normal heart sounds  Pulmonary:      Effort: Pulmonary effort is normal       Breath sounds: Normal breath sounds  Abdominal:      General: Bowel sounds are normal       Palpations: Abdomen is soft  Musculoskeletal:         General: Normal range of motion  Cervical back: Normal range of motion  Comments: + trigger fingers on both hands   Skin:     General: Skin is warm and dry  Neurological:      Mental Status: She is alert and oriented to person, place, and time  Deep Tendon Reflexes: Reflexes are normal and symmetric  Psychiatric:         Behavior: Behavior normal          Thought Content:  Thought content normal          Judgment: Judgment normal

## 2022-03-28 ENCOUNTER — OFFICE VISIT (OUTPATIENT)
Dept: OBGYN CLINIC | Facility: CLINIC | Age: 62
End: 2022-03-28
Payer: COMMERCIAL

## 2022-03-28 VITALS
BODY MASS INDEX: 35.08 KG/M2 | DIASTOLIC BLOOD PRESSURE: 91 MMHG | HEIGHT: 63 IN | SYSTOLIC BLOOD PRESSURE: 142 MMHG | WEIGHT: 198 LBS

## 2022-03-28 DIAGNOSIS — M65.341 TRIGGER RING FINGER OF RIGHT HAND: ICD-10-CM

## 2022-03-28 DIAGNOSIS — M65.332 TRIGGER MIDDLE FINGER OF LEFT HAND: ICD-10-CM

## 2022-03-28 DIAGNOSIS — M19.049 HAND ARTHRITIS: Primary | ICD-10-CM

## 2022-03-28 DIAGNOSIS — M65.342 TRIGGER RING FINGER OF LEFT HAND: ICD-10-CM

## 2022-03-28 DIAGNOSIS — M65.331 TRIGGER MIDDLE FINGER OF RIGHT HAND: ICD-10-CM

## 2022-03-28 PROCEDURE — 99204 OFFICE O/P NEW MOD 45 MIN: CPT | Performed by: FAMILY MEDICINE

## 2022-03-28 NOTE — PATIENT INSTRUCTIONS
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 with radial as well as ulnar deviation  Explained is likely osteoarthritis however patient also has other polyarthritis with left 1st MTP fusion as well as bilateral knee osteoarthritis  She also has a cousin with juvenile rheumatoid arthritis history  As such I recommended referral to Rheumatology and blood work to be done for possible rheumatism  Trigger finger (stenosing flexor tenosynovitis) is a catching or clicking of the finger (triggering) due to the formation of a soft tissue knot in the tendinous cable that moves your finger  When this knot of the tendon sheath passes through the A1 pulley at the bottom palmar side of your hand it becomes stock momentarily resulting in clicking or locking of the finger  There is no agreed upon cause for the formation of the knot but risk factors do include repetitive overuse and possibly prior trauma  Treatment involves avoidance of activities that trigger the finger, splinting with slight flexion at the knuckle (MCP joint) for 3-6 weeks or enrico taping  Oral anti-inflammatories also can help reduce swelling and triggering and are generally used for 2-4 weeks  If there is no relief with conservative measures or if there is severe locking then we will consider steroid injection once every 6 weeks for a maximum of 3 injections if there is no relief by 50%  Many patients, however, have great relief with only one injection and many (50%) have relief even up to 1 year  Surgery is considered if no relief after injection  (LIZZETH Sutton 2018)

## 2022-03-28 NOTE — PROGRESS NOTES
1  Hand arthritis  Ambulatory Referral to Rheumatology    PETE Screen w/ Reflex to Titer/Pattern    Sedimentation rate, automated    Rheumatoid Arthritis Factor    C-reactive protein    C-reactive protein   2  Trigger middle finger of right hand  Ambulatory referral to Orthopedic Surgery   3  Trigger ring finger of right hand  Ambulatory referral to Orthopedic Surgery    Ambulatory Referral to Occupational Therapy    Ambulatory Referral to Orthopedic Surgery   4  Trigger middle finger of left hand  Ambulatory referral to Orthopedic Surgery    Ambulatory Referral to Occupational Therapy    Ambulatory Referral to Orthopedic Surgery   5  Trigger ring finger of left hand  Ambulatory referral to Orthopedic Surgery     Orders Placed This Encounter   Procedures    PETE Screen w/ Reflex to Titer/Pattern    Sedimentation rate, automated    Rheumatoid Arthritis Factor    C-reactive protein    Ambulatory Referral to Occupational Therapy    Ambulatory Referral to Rheumatology    Ambulatory Referral to Orthopedic Surgery        IMAGING STUDIES: (I personally reviewed images in PACS and report):         PAST REPORTS:        ASSESSMENT/PLAN:  Bilateral multiple trigger fingers  10+ years  No previous relief with corticosteroid injection  Declined corticosteroid injection today  Agrees to see surgeon and for occupational therapy splinting      Repeat X-ray next visit: None    Return for Follow-up with Surgeon  Patient Instructions   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 with radial as well as ulnar deviation    Explained is likely osteoarthritis however patient also has other polyarthritis with left 1st MTP fusion as well as bilateral knee osteoarthritis  She also has a cousin with juvenile rheumatoid arthritis history  As such I recommended referral to Rheumatology and blood work to be done for possible rheumatism  Trigger finger (stenosing flexor tenosynovitis) is a catching or clicking of the finger (triggering) due to the formation of a soft tissue knot in the tendinous cable that moves your finger  When this knot of the tendon sheath passes through the A1 pulley at the bottom palmar side of your hand it becomes stock momentarily resulting in clicking or locking of the finger  There is no agreed upon cause for the formation of the knot but risk factors do include repetitive overuse and possibly prior trauma  Treatment involves avoidance of activities that trigger the finger, splinting with slight flexion at the knuckle (MCP joint) for 3-6 weeks or enrico taping  Oral anti-inflammatories also can help reduce swelling and triggering and are generally used for 2-4 weeks  If there is no relief with conservative measures or if there is severe locking then we will consider steroid injection once every 6 weeks for a maximum of 3 injections if there is no relief by 50%  Many patients, however, have great relief with only one injection and many (50%) have relief even up to 1 year  Surgery is considered if no relief after injection  (LIZZETH Sutton 2018)  __________________________________________________________________________    CHIEF COMPLAINT:  Bilateral ring and index trigger fingers    HPI:  Dex Quarles is a 63 y o  female  who presents for        Visit 03/28/2022: Intermittently trigger for the past 10 years bilaterally  Exacerbated by gardening  Today the right ring finger presents stuck in flexion triggered  Denies any injuries  Did have a corticosteroid injection right hand approximately 10 years ago which offered no relief  Worse in the past 3 years            Review of Systems      Following history reviewed and update:    Past Medical History:   Diagnosis Date    Abnormal mammogram     RESOLVED [id number]    Arthritis     Depression     GERD (gastroesophageal reflux disease)     diet managed, occasional    Hallux rigidus     LAST ASSESSED [id number] L grreat toe    Heart murmur From birth    Hyperlipidemia     Lyme disease     LAST ASSESSED [id number]    Murmur, cardiac     functional    PONV (postoperative nausea and vomiting)     Trigger finger     ,r hand ring finger and middle; L same fingers    Wears glasses      Past Surgical History:   Procedure Laterality Date    COLONOSCOPY      LAST ASSESSED [id number]    DILATION AND CURETTAGE OF UTERUS      ENDOMETRIAL ABLATION      ENDOMETRIAL CRYOABLATION      WITH ULTRASOUND GUIDE     KNEE ARTHROSCOPY      b/l meniscus    [place] FUSION BIG TOE,MT-P JT Left 1/18/2019    Procedure: GREAT TOE FUSION WITH PLATE;  Surgeon: Karen Arita DPM;  Location: [facility];  Service: Podiatry    TONSILLECTOMY AND ADENOIDECTOMY       Social History   Social History     Substance and Sexual Activity   Alcohol Use Yes    Alcohol/week: 0 0 standard drinks    Comment: Very seldom     Social History     Substance and Sexual Activity   Drug Use No     Social History     Tobacco Use   Smoking Status Never Smoker   Smokeless Tobacco Never Used     Family History   Problem Relation Age of Onset    Heart disease Mother     Colon polyps Mother     Stroke Mother     COPD Mother     Hypertension Father     Kidney failure Father     Hyperlipidemia Father     Coronary artery disease Family     Hypertension Family     Lung cancer Sister 61    No Known Problems Daughter     Breast cancer Maternal Grandmother 36    Breast cancer Paternal Grandmother [de-identified]    Breast cancer Maternal Aunt     No Known Problems Maternal Aunt     No Known Problems Maternal Aunt     No Known Problems Maternal Aunt     No Known Problems Paternal Aunt     No Known Problems Sister     No Known Problems Maternal Grandfather     Arthritis Paternal Grandfather     No Known Problems Son     Cancer Sister      No Known Allergies       Physical Exam  Ht 5' 3" (1 6 m)   Wt 89 8 kg (198 lb)   LMP  (LMP Unknown)   BMI 35 07 kg/m²     Constitutional:  see vital signs  Gen: well-developed, normocephalic/atraumatic, well-groomed  Eyes: No inflammation or discharge of conjunctiva or lids; sclera clear   Pharynx: no inflammation, lesion, or mass of lips  Neck: supple, no masses, non-distended  MSK: no inflammation, lesion, mass, or clubbing of nails and digits except for other than mentioned below  SKIN: no visible rashes or skin lesions  Pulmonary/Chest: Effort normal  No respiratory distress     NEURO: cranial nerves grossly intact  PSYCH:  Alert and oriented to person, place, and time; recent and remote memory intact; mood normal, no depression, anxiety, or agitation, judgment and insight good and intact     Ortho Exam  Right Elbow:  no swelling, erythema, or increased warmth  rom full  nontender  no laxity of joint  Cubital tunnel Tinel's test:  Distal Biceps Hook test:    Right Wrist  no swelling, erythema, or increased warmth  rom full  nontender  no laxity of joint; druj stable  Carpal tunnel compression test:  Phalen's test:  Tinel's carpal tunnel test:          Right Hand  no erythema  Swelling: none  Tenderness: none  rom fingers mcp, pip, dip intact without pain; triggering ring and index finger  No digital scissoring or deviation of fingers  no extensor lag  no rotation of fingers  no joint laxity  strenght flexion and extension mcp, pip, dip 5/5  sensation intact  capillary refill intact   Froment sign:  normal  OK sign:  Normal  Thumb extension:  5/5     Left Elbow:  no swelling, erythema, or increased warmth  rom full  nontender  no laxity of joint  Cubital tunnel Tinel's test:  Distal Biceps Hook test:    Left Wrist  no swelling, erythema, or increased warmth  rom full  nontender  no laxity of joint; druj stable  Carpal tunnel compression test:  Phalen's test:  Tinel's carpal tunnel test:    Left Hand  no erythema  Swelling: none  Tenderness: none  rom fingers mcp, pip, dip intact without pain; triggering ring  middle finger  No digital scissoring or deviation of fingers  no extensor lag  no rotation of fingers  no joint laxity  strenght flexion and extension mcp, pip, dip 5/5  sensation intact  capillary refill intact   Froment sign:  normal  OK sign:  Normal  Thumb extension:  5/5   __________________________________________________________________________  Procedures

## 2022-03-29 ENCOUNTER — APPOINTMENT (OUTPATIENT)
Dept: RADIOLOGY | Facility: CLINIC | Age: 62
End: 2022-03-29
Payer: COMMERCIAL

## 2022-03-29 ENCOUNTER — OFFICE VISIT (OUTPATIENT)
Dept: RHEUMATOLOGY | Facility: CLINIC | Age: 62
End: 2022-03-29
Payer: COMMERCIAL

## 2022-03-29 VITALS
DIASTOLIC BLOOD PRESSURE: 98 MMHG | SYSTOLIC BLOOD PRESSURE: 144 MMHG | HEIGHT: 63 IN | WEIGHT: 198 LBS | BODY MASS INDEX: 35.08 KG/M2

## 2022-03-29 DIAGNOSIS — M19.049 HAND ARTHRITIS: ICD-10-CM

## 2022-03-29 DIAGNOSIS — M19.90 ARTHRITIS: Primary | ICD-10-CM

## 2022-03-29 DIAGNOSIS — M19.90 ARTHRITIS: ICD-10-CM

## 2022-03-29 DIAGNOSIS — M47.819 SPONDYLOARTHRITIS: ICD-10-CM

## 2022-03-29 PROCEDURE — 73130 X-RAY EXAM OF HAND: CPT

## 2022-03-29 PROCEDURE — 3008F BODY MASS INDEX DOCD: CPT | Performed by: INTERNAL MEDICINE

## 2022-03-29 PROCEDURE — 72202 X-RAY EXAM SI JOINTS 3/> VWS: CPT

## 2022-03-29 PROCEDURE — 99204 OFFICE O/P NEW MOD 45 MIN: CPT | Performed by: INTERNAL MEDICINE

## 2022-03-29 PROCEDURE — 1036F TOBACCO NON-USER: CPT | Performed by: INTERNAL MEDICINE

## 2022-03-29 RX ORDER — SULFASALAZINE 500 MG/1
500 TABLET ORAL 2 TIMES DAILY
Qty: 60 TABLET | Refills: 5 | Status: SHIPPED | OUTPATIENT
Start: 2022-03-29 | End: 2022-08-02

## 2022-03-29 RX ORDER — MELOXICAM 15 MG/1
15 TABLET ORAL DAILY
Qty: 30 TABLET | Refills: 0 | Status: SHIPPED | OUTPATIENT
Start: 2022-03-29 | End: 2022-08-02

## 2022-03-29 NOTE — PROGRESS NOTES
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--check labs  Xrays of hands/SI joints  Continue PT  Standing NSAIDs x 4 weeks  Begin SSA 500mg BID  Anti-inflammatory diet/exercise/weight control  Likely will be a candidate for anti-TNF alpha therapy  RTC in 3-4 mos  or sooner if necessary  History of Present Illness:  Prabhakar Grimm is a 63 y o  female Here for initial eval   She has had mult  trigger fingers  She has joint pain, swelling and stiffness  +inflammatory spinal pain  No psoriasis or inflammatory eye disease  No f/c/ns  No c/cp/sob  Review of Systems  Review of Systems   Constitutional: Negative for fatigue, fever and unexpected weight change  HENT: Negative for mouth sores  Respiratory: Negative for cough and shortness of breath  Cardiovascular: Negative for chest pain and leg swelling  Gastrointestinal: Negative for abdominal pain, constipation and diarrhea  Musculoskeletal: Positive for arthralgias and back pain  Negative for joint swelling and myalgias  Skin: Negative for color change and rash  Neurological: Negative for weakness  Hematological: Negative for adenopathy  Psychiatric/Behavioral: Negative for sleep disturbance         Allergies  No Known Allergies    Current Medications      Past Medical History  Past Medical History:   Diagnosis Date    Abnormal mammogram     RESOLVED [id number]    Arthritis     Depression     GERD (gastroesophageal reflux disease)     diet managed, occasional    Hallux rigidus     LAST ASSESSED [id number] L grreat toe    Heart murmur From birth    Hyperlipidemia     Lyme disease     LAST ASSESSED [id number]    Murmur, cardiac     functional    PONV (postoperative nausea and vomiting)     Trigger finger     ,r hand ring finger and middle; L same fingers    Wears glasses Past Surgical History  Past Surgical History:   Procedure Laterality Date    COLONOSCOPY      LAST ASSESSED [id number]    DILATION AND CURETTAGE OF UTERUS      ENDOMETRIAL ABLATION      ENDOMETRIAL CRYOABLATION      WITH ULTRASOUND GUIDE     KNEE ARTHROSCOPY      b/l meniscus    [place] FUSION BIG TOE,MT-P JT Left 1/18/2019    Procedure: GREAT TOE FUSION WITH PLATE;  Surgeon: Jocelyne Minor DPM;  Location: [facility];  Service: Podiatry    TONSILLECTOMY AND ADENOIDECTOMY         Family History  No known autoimmune or inflammatory diseases in the family  Family History   Problem Relation Age of Onset    Heart disease Mother     Colon polyps Mother     Stroke Mother     COPD Mother     Hypertension Father     Kidney failure Father     Hyperlipidemia Father     Coronary artery disease Family     Hypertension Family     Lung cancer Sister 61    No Known Problems Daughter     Breast cancer Maternal Grandmother 36    Breast cancer Paternal Grandmother [de-identified]    Breast cancer Maternal Aunt     No Known Problems Maternal Aunt     No Known Problems Maternal Aunt     No Known Problems Maternal Aunt     No Known Problems Paternal Aunt     No Known Problems Sister     No Known Problems Maternal Grandfather     Arthritis Paternal Grandfather     No Known Problems Son     Cancer Sister        Social History  Occupation: works for her 's construction business  Social History     Substance and Sexual Activity   Alcohol Use Yes    Alcohol/week: 0 0 standard drinks    Comment: Very seldom     Social History     Substance and Sexual Activity   Drug Use No     Social History     Tobacco Use   Smoking Status Never Smoker   Smokeless Tobacco Never Used        Objective:  /98   Ht 5' 3" (1 6 m)   Wt 89 8 kg (198 lb)   LMP  (LMP Unknown)   BMI 35 07 kg/m²     Physical Exam  Constitutional:       General: She is not in acute distress  HENT:      Head: Normocephalic and atraumatic     Eyes: Conjunctiva/sclera: Conjunctivae normal    Cardiovascular:      Rate and Rhythm: Normal rate and regular rhythm  Heart sounds: S1 normal and S2 normal  No friction rub  Pulmonary:      Effort: Pulmonary effort is normal  No respiratory distress  Breath sounds: Normal breath sounds  No wheezing, rhonchi or rales  Musculoskeletal:      Right hand: Tenderness present  Left hand: Tenderness present  Cervical back: Neck supple  Right knee: Crepitus present  Left knee: Crepitus present  Skin:     General: Skin is warm and dry  Coloration: Skin is not pale  Findings: No rash  Neurological:      Mental Status: She is alert  Mental status is at baseline  Psychiatric:         Mood and Affect: Mood normal          Behavior: Behavior normal             Lab Results: I have personally reviewed pertinent reports        CBC:   , Chemistry Profile:       Invalid input(s): ALBUMIN, Coagulation Studies:   , Cardiac Studies:   , Additional Labs:   , iSTAT CHEM 8:       Invalid input(s): POTASSIUMIS, ABG:   , Toxicology:   , Last A1C/Lipid Panel/Thyroid Panel:   Lab Results   Component Value Date    TRIG 97 10/22/2021    TRIG 88 09/30/2020    CHOL 202 (H) 08/09/2017    CHOL 203 (H) 07/13/2016    HDL 53 10/22/2021    HDL 60 09/30/2020    LDLCALC 111 (H) 10/22/2021    LDLCALC 116 (H) 09/30/2020    [id number] 3 830 08/09/2017    FREET4 1 06 07/18/2016     Lab Results   Component Value Date    HEPCAB <0 1 05/20/2019           Invalid input(s): URIBILINOGEN         Imaging: I have personally reviewed pertinent films in PACS

## 2022-03-29 NOTE — PATIENT INSTRUCTIONS
Please keep a healthy diet rich in fruits and vegetables  Please maintain physical activity as tolerated  meloxicam has been ordered to help you with the inflammation, please take 1 tab daily with meals  Xray of the hands and sacroiliac joint has been ordered  Blood test ( HLA B27 , CCP, Hep B and C, Quantiferon gold) has been ordered  F/U luis 4 months   sulfasalazine 500 mg bid has been ordered , please take 1 tab twice a day

## 2022-03-30 LAB
ANA TITR SER IF: NEGATIVE {TITER}
CRP SERPL-MCNC: <1 MG/L (ref 0–10)
ERYTHROCYTE [SEDIMENTATION RATE] IN BLOOD BY WESTERGREN METHOD: 4 MM/HR (ref 0–40)
RHEUMATOID FACT SERPL-ACNC: <10 IU/ML

## 2022-04-05 ENCOUNTER — TELEPHONE (OUTPATIENT)
Dept: PAIN MEDICINE | Facility: MEDICAL CENTER | Age: 62
End: 2022-04-05

## 2022-04-05 DIAGNOSIS — M48.061 LUMBAR FORAMINAL STENOSIS: Primary | ICD-10-CM

## 2022-04-05 DIAGNOSIS — M54.16 LUMBAR RADICULITIS: ICD-10-CM

## 2022-04-05 NOTE — TELEPHONE ENCOUNTER
Pt is interested in another injection   She states the pain is running from her left hip to her right hip now     Pt can be reached at [phone]

## 2022-04-06 ENCOUNTER — EVALUATION (OUTPATIENT)
Dept: OCCUPATIONAL THERAPY | Facility: CLINIC | Age: 62
End: 2022-04-06
Payer: COMMERCIAL

## 2022-04-06 DIAGNOSIS — M65.332 TRIGGER MIDDLE FINGER OF LEFT HAND: ICD-10-CM

## 2022-04-06 DIAGNOSIS — M65.341 TRIGGER RING FINGER OF RIGHT HAND: ICD-10-CM

## 2022-04-06 PROCEDURE — 97165 OT EVAL LOW COMPLEX 30 MIN: CPT | Performed by: OCCUPATIONAL THERAPIST

## 2022-04-06 PROCEDURE — 97760 ORTHOTIC MGMT&TRAING 1ST ENC: CPT | Performed by: OCCUPATIONAL THERAPIST

## 2022-04-06 NOTE — TELEPHONE ENCOUNTER
S/w pt, stated that she has had increased pain from the center of her low back / buttocks to the R hip and outside of her thigh  Pt stated that she has a little pain on the right  Per pt, this started ~ 1 week ago after a chiropractor appt  Per pt, she has been using heat, exercize and tylenol arthritis 3 pills bid w/ no improvement  Pt is questioning any suggestions to try and treat this pain  Pt stated that she has also been referred to rheumatology for poss ra  Advised pt, will d/w DG / SL and cb to advise  Pt verbalized understanding and appreciation

## 2022-04-06 NOTE — TELEPHONE ENCOUNTER
We can proceed with a Left L3 & L4 TFESI to see if that helps with the worsening Left sided pain  I put the order in  She will then need a f/u OV with me or Bri Griffin 3-4 weeks after the procedure  Thank you

## 2022-04-06 NOTE — PROGRESS NOTES
OT Evaluation     Today's date: 2022  Patient name: Barb Henning  : 1960  MRN: [id number]  Referring provider: Dean Campbell  Dx:   Encounter Diagnosis     ICD-10-CM    1  Trigger ring finger of right hand  M65 [id number] Ambulatory Referral to Occupational Therapy   2  Trigger middle finger of left hand  M65 332 Ambulatory Referral to Occupational Therapy                  Assessment  Assessment details: Pt  Presents to department with history of chronic trigger fingers in bilateral hands  Pt  Has limited mobility and pain with gripping  Pt  Was referred to hand therapy for MP blocking splints  She is to follow up with orthopedic surgeon next week for recommendation of tx  Pt  To wear splints with gripping activities  Recommend towel rolls for night to limit clenching of  Fist   Pt  To follow up with department PRN  She was instructed on heat modalities and intrinsic stretches  Impairments: abnormal or restricted ROM, activity intolerance and lacks appropriate home exercise program  Functional limitations: Pt  has limited composite fistUnderstanding of Dx/Px/POC: fair   Prognosis: fair    Goals  1  Compliant with splinting/HEP    Plan  Patient would benefit from: custom splinting and OT eval  Planned modality interventions: cryotherapy and thermotherapy: hydrocollator packs  Planned therapy interventions: orthotic fitting/training  Frequency: 1x week  Duration in visits: 1  Duration in weeks: 4  Plan of Care beginning date: 2022  Plan of Care expiration date: 2022  Treatment plan discussed with: patient        Subjective Evaluation    History of Present Illness  Mechanism of injury: Pt  Has history of trigger finger bilaterally R and L middle and ring fingers  Pt  Awaiting consult with ortho sx  Pt  Is referred to therapy for evaluation and treatment    Quality of life: good    Pain  Current pain ratin  At worst pain ratin  Quality: discomfort  Relieving factors: heat  Progression: no change    Social Support  Lives in: multiple-level home  Lives with: spouse    Employment status: working  Hand dominance: right      Diagnostic Tests  X-ray: abnormal  Treatments  Current treatment: occupational therapy  Patient Goals  Patient goals for therapy: decreased pain          Objective     Active Range of Motion     Left Wrist   Normal active range of motion    Right Wrist   Normal active range of motion    Additional Active Range of Motion Details  Limited ROM of bilateral hands   ~50% composite fist              Precautions: Arthritis      Manuals 4/6             IE 30'                                                   Neuro Re-Ed             MP blocking splints Ortho fit            HEP- intrinsic stretches, heat modalities reviewed                                                                             Ther Ex                                                                                                                     Ther Activity                                       Gait Training                                       Modalities

## 2022-04-06 NOTE — TELEPHONE ENCOUNTER
We can proceed with a Right L3 & L4 TFESI  I changed the order to the Right side  Can you please call the patient and schedule her for a Right L3 & L4 TFESI with Dr Sonia Vaz and then a f/u OV 3-4 weeks after with me or Keila Jefferson

## 2022-04-06 NOTE — TELEPHONE ENCOUNTER
S/w pt, advised of above  Pt confirmed RIGHT sided symptoms  Advised pt, the order will be corrected  Anticipate R L3, L4 TFESI to address the right sided symptoms as discussed  Pt verbalized understanding and appreciation  Will await a cb from [address] [place] [address] to schedule procedure and fu as discussed

## 2022-04-07 NOTE — TELEPHONE ENCOUNTER
PRE OP INSTRUCTIONS:     -If you are on prescription blood thinners, you may have to hold the medication for several days before the procedure  Please call the office to    discuss medication holds at [phone]   -Do not eat or drink ONE HOUR prior to your procedure  If you are diabetic, may follow regular breakfast/lunch schedule and take usual    diabetic medications   -Lumbar( low back) procedure, please wear comfortable slacks/pants   -Cervical (neck) procedure, please wear a shirt/blouse that is easy to remove   -A  is required to take you home form your procedure   -Continue all to take prescribed medication the day of your procedure, including blood pressure medications   -If you are prescribe antibiotics, have an active infection or have an open wound, please contact the office at [phone]   -Please refrain from any vaccinations two weeks before and two weeks after injection   -Insurance authorization received in not a guarantee of payment per your insurance company's authorization disclaimer and it is    your responsibility to verify your benefits  -If you have any questions about the instructions, please call me at [phone]    Hold medication  x _ full days prior, last dose on _  Patient advised to hold medication from Date till their appointment at that time instructions to restart will be given  Patient stated verbal understanding  Aware that nursing will call her to review hold dates as well

## 2022-04-12 ENCOUNTER — OFFICE VISIT (OUTPATIENT)
Dept: OBGYN CLINIC | Facility: CLINIC | Age: 62
End: 2022-04-12
Payer: COMMERCIAL

## 2022-04-12 VITALS
DIASTOLIC BLOOD PRESSURE: 72 MMHG | BODY MASS INDEX: 35.61 KG/M2 | HEIGHT: 63 IN | SYSTOLIC BLOOD PRESSURE: 120 MMHG | WEIGHT: 201 LBS

## 2022-04-12 DIAGNOSIS — M65.342 TRIGGER RING FINGER OF LEFT HAND: ICD-10-CM

## 2022-04-12 DIAGNOSIS — M65.331 TRIGGER MIDDLE FINGER OF RIGHT HAND: ICD-10-CM

## 2022-04-12 DIAGNOSIS — M65.341 TRIGGER RING FINGER OF RIGHT HAND: ICD-10-CM

## 2022-04-12 DIAGNOSIS — M65.332 TRIGGER MIDDLE FINGER OF LEFT HAND: Primary | ICD-10-CM

## 2022-04-12 PROCEDURE — 3008F BODY MASS INDEX DOCD: CPT | Performed by: ORTHOPAEDIC SURGERY

## 2022-04-12 PROCEDURE — 1036F TOBACCO NON-USER: CPT | Performed by: ORTHOPAEDIC SURGERY

## 2022-04-12 PROCEDURE — 99204 OFFICE O/P NEW MOD 45 MIN: CPT | Performed by: ORTHOPAEDIC SURGERY

## 2022-04-12 RX ORDER — CHLORHEXIDINE GLUCONATE 4 G/100ML
SOLUTION TOPICAL DAILY PRN
Status: CANCELLED | OUTPATIENT
Start: 2022-04-12

## 2022-04-12 RX ORDER — LIDOCAINE HYDROCHLORIDE 10 MG/ML
10 INJECTION, SOLUTION EPIDURAL; INFILTRATION; INTRACAUDAL; PERINEURAL ONCE
Status: CANCELLED | OUTPATIENT
Start: 2022-04-12 | End: 2022-04-12

## 2022-04-12 RX ORDER — BUPIVACAINE HYDROCHLORIDE 5 MG/ML
10 INJECTION, SOLUTION PERINEURAL ONCE
Status: CANCELLED | OUTPATIENT
Start: 2022-04-12 | End: 2022-04-12

## 2022-04-12 NOTE — PROGRESS NOTES
Assessment:     1  Trigger middle finger of left hand    2  Trigger ring finger of left hand    3  Trigger middle finger of right hand    4  Trigger ring finger of right hand        Plan:     Problem List Items Addressed This Visit        Musculoskeletal and Integument    Trigger middle finger of left hand - Primary    Relevant Orders    Case request operating room: RELEASE TRIGGER FINGER, LEFT MIDDLE AND RING (Completed)    Trigger ring finger of left hand    Relevant Orders    Case request operating room: RELEASE TRIGGER FINGER, LEFT MIDDLE AND RING (Completed)    Trigger middle finger of right hand    Trigger ring finger of right hand        Findings consistent with bilateral long and ring trigger fingers  Left is currently more symptomatic and painful and patient would like to proceed with trigger finger release of left long and ring fingers  Surgery is outpatient-same day  Once she has recovered from left she can then proceed with right long and ring trigger finger release  See patient post operatively  She can continue with massage and MP blocking splints at night  Also use moist heat as well  OTC anti inflammatories as needed for pain  All patient's questions were answered to her satisfaction  This note is created using dictation transcription  It may contain typographical errors, grammatical errors, improperly dictated words, background noise and other errors  Discussed with patient surgical risks and complications including but not limited to infection, persistent pain, nerve and vessel injury, complications associated with anesthesia, DVT, exposure to COVID virus, etc   Patient understands the risks and complication and consented to the surgery  Subjective:     Patient ID: Abby Bauer is a 63 y o  female  Chief Complaint:  63 yr old female in for evaluation of her right hand trigger fingers long and ring referred by Dr Cal Magana  Ongoing trigger fingers in bilateral hands for 10 yrs   She did try one CSI in right long finger years ago with no relief  She declined CSI by Dr Karen Emerson and is here to discuss possible surgical intervention  OT did make MP blocking splints at night for bilateral fingers of long and ring  She has daily locking and pain in both long and ring fingers left>right  Pain has become worse  This is affecting her daily activities  She is trying to massage fingers and use OTC anti inflammatories as needed  Information on patient's intake form was reviewed      Allergy:  No Known Allergies  Medications:  all current active meds have been reviewed  Past Medical History:  Past Medical History:   Diagnosis Date    Abnormal mammogram     RESOLVED [id number]    Arthritis     Depression     GERD (gastroesophageal reflux disease)     diet managed, occasional    Hallux rigidus     LAST ASSESSED [id number] L grreat toe    Heart murmur From birth    Hyperlipidemia     Lyme disease     LAST ASSESSED [id number]    Murmur, cardiac     functional    PONV (postoperative nausea and vomiting)     Trigger finger     ,r hand ring finger and middle; L same fingers    Wears glasses      Past Surgical History:  Past Surgical History:   Procedure Laterality Date    COLONOSCOPY      LAST ASSESSED [id number]    DILATION AND CURETTAGE OF UTERUS      ENDOMETRIAL ABLATION      ENDOMETRIAL CRYOABLATION      WITH ULTRASOUND GUIDE     KNEE ARTHROSCOPY      b/l meniscus    NE FUSION BIG TOE,MT-P JT Left 1/18/2019    Procedure: GREAT TOE FUSION WITH PLATE;  Surgeon: Francy Ballesteros DPM;  Location: [facility];  Service: Podiatry    TONSILLECTOMY AND ADENOIDECTOMY       Family History:  Family History   Problem Relation Age of Onset    Heart disease Mother     Colon polyps Mother     Stroke Mother     COPD Mother     Hypertension Father     Kidney failure Father     Hyperlipidemia Father     Coronary artery disease Family     Hypertension Family     Lung cancer Sister 61    No Known Problems Daughter     Breast cancer Maternal Grandmother 36    Breast cancer Paternal Grandmother [de-identified]    Breast cancer Maternal Aunt     No Known Problems Maternal Aunt     No Known Problems Maternal Aunt     No Known Problems Maternal Aunt     No Known Problems Paternal Aunt     No Known Problems Sister     No Known Problems Maternal Grandfather     Arthritis Paternal Grandfather     No Known Problems Son     Cancer Sister      Social History:  Social History     Substance and Sexual Activity   Alcohol Use Yes    Alcohol/week: 0 0 standard drinks    Comment: Very seldom     Social History     Substance and Sexual Activity   Drug Use No     Social History     Tobacco Use   Smoking Status Never Smoker   Smokeless Tobacco Never Used     Review of Systems   Constitutional: Negative for chills and fever  HENT: Negative for ear pain and sore throat  Eyes: Negative for pain and visual disturbance  Respiratory: Negative for cough and shortness of breath  Cardiovascular: Negative for chest pain and palpitations  Gastrointestinal: Negative for abdominal pain and vomiting  Genitourinary: Negative for dysuria and hematuria  Musculoskeletal: Positive for arthralgias (Left and right middle and ring finger)  Negative for back pain, gait problem and joint swelling  Skin: Negative for color change and rash  Neurological: Negative for seizures and syncope  Psychiatric/Behavioral: Negative  All other systems reviewed and are negative  Objective:  BP Readings from Last 1 Encounters:   04/12/22 120/72      Wt Readings from Last 1 Encounters:   04/12/22 91 2 kg (201 lb)      BMI:   Estimated body mass index is 35 61 kg/m² as calculated from the following:    Height as of this encounter: 5' 3" (1 6 m)  Weight as of this encounter: 91 2 kg (201 lb)  BSA:   Estimated body surface area is 1 94 meters squared as calculated from the following:    Height as of this encounter: 5' 3" (1 6 m)      Weight as of this encounter: 91 2 kg (201 lb)  Physical Exam  Vitals and nursing note reviewed  Constitutional:       Appearance: Normal appearance  She is well-developed  HENT:      Head: Normocephalic and atraumatic  Right Ear: External ear normal       Left Ear: External ear normal    Eyes:      Extraocular Movements: Extraocular movements intact  Conjunctiva/sclera: Conjunctivae normal       Pupils: Pupils are equal, round, and reactive to light  Cardiovascular:      Rate and Rhythm: Regular rhythm  Heart sounds: Normal heart sounds  No murmur heard  No gallop  Pulmonary:      Effort: Pulmonary effort is normal       Breath sounds: Normal breath sounds  Abdominal:      Palpations: Abdomen is soft  Musculoskeletal:         General: Tenderness (hand arthralgia bilateral long and ring) present  Cervical back: Normal range of motion and neck supple  Skin:     General: Skin is warm and dry  Neurological:      Mental Status: She is alert and oriented to person, place, and time  Deep Tendon Reflexes: Reflexes are normal and symmetric  Psychiatric:         Mood and Affect: Mood normal          Behavior: Behavior normal        Right Hand Exam     Tenderness   Right hand tenderness location: A-1 pulley long and ring  Range of Motion   The patient has normal right wrist ROM  Hand   MP Middle: normal   MP Ring: normal   PIP Middle: normal   PIP Ring: normal   DIP Middle: normal   DIP Ring: normal     Muscle Strength   The patient has normal right wrist strength  Tests   Tinel's sign (median nerve): negative  Finkelstein's test: negative    Other   Erythema: absent  Scars: absent  Sensation: normal  Pulse: present    Comments:  Palpable nodules along A-1 pulleys of long and ring fingers with active triggering and locking with pain  She is able to make composite fist  Sensation intact       Left Hand Exam     Tenderness   Left hand tenderness location: A-1 pulley long and ring  Range of Motion   The patient has normal left wrist ROM  Hand   MP Middle: normal   MP Ring: normal   PIP Middle: normal   PIP Ring: normal   DIP Middle: normal   DIP Ring: normal     Muscle Strength   The patient has normal left wrist strength      Tests   Tinel's sign (median nerve): negative  Finkelstein's test: negative    Other   Erythema: absent  Scars: absent  Sensation: normal  Pulse: present    Comments:  Palpable nodules along A-1 pulleys of long and ring fingers with active triggering and locking with pain  She is able to make composite fist  Sensation intact             no images to review      Scribe Attestation    I,:  Alex Betancourt am acting as a scribe while in the presence of the attending physician :       I,:  Sergei Slade MD personally performed the services described in this documentation    as scribed in my presence :

## 2022-04-12 NOTE — H&P (VIEW-ONLY)
Assessment:     1  Trigger middle finger of left hand    2  Trigger ring finger of left hand    3  Trigger middle finger of right hand    4  Trigger ring finger of right hand        Plan:     Problem List Items Addressed This Visit        Musculoskeletal and Integument    Trigger middle finger of left hand - Primary    Relevant Orders    Case request operating room: RELEASE TRIGGER FINGER, LEFT MIDDLE AND RING (Completed)    Trigger ring finger of left hand    Relevant Orders    Case request operating room: RELEASE TRIGGER FINGER, LEFT MIDDLE AND RING (Completed)    Trigger middle finger of right hand    Trigger ring finger of right hand        Findings consistent with bilateral long and ring trigger fingers  Left is currently more symptomatic and painful and patient would like to proceed with trigger finger release of left long and ring fingers  Surgery is outpatient-same day  Once she has recovered from left she can then proceed with right long and ring trigger finger release  See patient post operatively  She can continue with massage and MP blocking splints at night  Also use moist heat as well  OTC anti inflammatories as needed for pain  All patient's questions were answered to her satisfaction  This note is created using dictation transcription  It may contain typographical errors, grammatical errors, improperly dictated words, background noise and other errors  Discussed with patient surgical risks and complications including but not limited to infection, persistent pain, nerve and vessel injury, complications associated with anesthesia, DVT, exposure to COVID virus, etc   Patient understands the risks and complication and consented to the surgery  Subjective:     Patient ID: Brandi Fisher is a 63 y o  female  Chief Complaint:  63 yr old female in for evaluation of her right hand trigger fingers long and ring referred by Dr Loomis Keep  Ongoing trigger fingers in bilateral hands for 10 yrs   She did try one CSI in right long finger years ago with no relief  She declined CSI by Dr Angela Guallpa and is here to discuss possible surgical intervention  OT did make MP blocking splints at night for bilateral fingers of long and ring  She has daily locking and pain in both long and ring fingers left>right  Pain has become worse  This is affecting her daily activities  She is trying to massage fingers and use OTC anti inflammatories as needed  Information on patient's intake form was reviewed      Allergy:  No Known Allergies  Medications:  all current active meds have been reviewed  Past Medical History:  Past Medical History:   Diagnosis Date    Abnormal mammogram     RESOLVED [id number]    Arthritis     Depression     GERD (gastroesophageal reflux disease)     diet managed, occasional    Hallux rigidus     LAST ASSESSED [id number] L grreat toe    Heart murmur From birth    Hyperlipidemia     Lyme disease     LAST ASSESSED [id number]    Murmur, cardiac     functional    PONV (postoperative nausea and vomiting)     Trigger finger     ,r hand ring finger and middle; L same fingers    Wears glasses      Past Surgical History:  Past Surgical History:   Procedure Laterality Date    COLONOSCOPY      LAST ASSESSED [id number]    DILATION AND CURETTAGE OF UTERUS      ENDOMETRIAL ABLATION      ENDOMETRIAL CRYOABLATION      WITH ULTRASOUND GUIDE     KNEE ARTHROSCOPY      b/l meniscus    VT FUSION BIG TOE,MT-P JT Left 1/18/2019    Procedure: GREAT TOE FUSION WITH PLATE;  Surgeon: Yoni Prajapati DPM;  Location: [facility];  Service: Podiatry    TONSILLECTOMY AND ADENOIDECTOMY       Family History:  Family History   Problem Relation Age of Onset    Heart disease Mother     Colon polyps Mother     Stroke Mother     COPD Mother     Hypertension Father     Kidney failure Father     Hyperlipidemia Father     Coronary artery disease Family     Hypertension Family     Lung cancer Sister 61    No Known Problems Daughter     Breast cancer Maternal Grandmother 36    Breast cancer Paternal Grandmother [de-identified]    Breast cancer Maternal Aunt     No Known Problems Maternal Aunt     No Known Problems Maternal Aunt     No Known Problems Maternal Aunt     No Known Problems Paternal Aunt     No Known Problems Sister     No Known Problems Maternal Grandfather     Arthritis Paternal Grandfather     No Known Problems Son     Cancer Sister      Social History:  Social History     Substance and Sexual Activity   Alcohol Use Yes    Alcohol/week: 0 0 standard drinks    Comment: Very seldom     Social History     Substance and Sexual Activity   Drug Use No     Social History     Tobacco Use   Smoking Status Never Smoker   Smokeless Tobacco Never Used     Review of Systems   Constitutional: Negative for chills and fever  HENT: Negative for ear pain and sore throat  Eyes: Negative for pain and visual disturbance  Respiratory: Negative for cough and shortness of breath  Cardiovascular: Negative for chest pain and palpitations  Gastrointestinal: Negative for abdominal pain and vomiting  Genitourinary: Negative for dysuria and hematuria  Musculoskeletal: Positive for arthralgias (Left and right middle and ring finger)  Negative for back pain, gait problem and joint swelling  Skin: Negative for color change and rash  Neurological: Negative for seizures and syncope  Psychiatric/Behavioral: Negative  All other systems reviewed and are negative  Objective:  BP Readings from Last 1 Encounters:   04/12/22 120/72      Wt Readings from Last 1 Encounters:   04/12/22 91 2 kg (201 lb)      BMI:   Estimated body mass index is 35 61 kg/m² as calculated from the following:    Height as of this encounter: 5' 3" (1 6 m)  Weight as of this encounter: 91 2 kg (201 lb)  BSA:   Estimated body surface area is 1 94 meters squared as calculated from the following:    Height as of this encounter: 5' 3" (1 6 m)      Weight as of this encounter: 91 2 kg (201 lb)  Physical Exam  Vitals and nursing note reviewed  Constitutional:       Appearance: Normal appearance  She is well-developed  HENT:      Head: Normocephalic and atraumatic  Right Ear: External ear normal       Left Ear: External ear normal    Eyes:      Extraocular Movements: Extraocular movements intact  Conjunctiva/sclera: Conjunctivae normal       Pupils: Pupils are equal, round, and reactive to light  Cardiovascular:      Rate and Rhythm: Regular rhythm  Heart sounds: Normal heart sounds  No murmur heard  No gallop  Pulmonary:      Effort: Pulmonary effort is normal       Breath sounds: Normal breath sounds  Abdominal:      Palpations: Abdomen is soft  Musculoskeletal:         General: Tenderness (hand arthralgia bilateral long and ring) present  Cervical back: Normal range of motion and neck supple  Skin:     General: Skin is warm and dry  Neurological:      Mental Status: She is alert and oriented to person, place, and time  Deep Tendon Reflexes: Reflexes are normal and symmetric  Psychiatric:         Mood and Affect: Mood normal          Behavior: Behavior normal        Right Hand Exam     Tenderness   Right hand tenderness location: A-1 pulley long and ring  Range of Motion   The patient has normal right wrist ROM  Hand   MP Middle: normal   MP Ring: normal   PIP Middle: normal   PIP Ring: normal   DIP Middle: normal   DIP Ring: normal     Muscle Strength   The patient has normal right wrist strength  Tests   Tinel's sign (median nerve): negative  Finkelstein's test: negative    Other   Erythema: absent  Scars: absent  Sensation: normal  Pulse: present    Comments:  Palpable nodules along A-1 pulleys of long and ring fingers with active triggering and locking with pain  She is able to make composite fist  Sensation intact       Left Hand Exam     Tenderness   Left hand tenderness location: A-1 pulley long and ring  Range of Motion   The patient has normal left wrist ROM  Hand   MP Middle: normal   MP Ring: normal   PIP Middle: normal   PIP Ring: normal   DIP Middle: normal   DIP Ring: normal     Muscle Strength   The patient has normal left wrist strength      Tests   Tinel's sign (median nerve): negative  Finkelstein's test: negative    Other   Erythema: absent  Scars: absent  Sensation: normal  Pulse: present    Comments:  Palpable nodules along A-1 pulleys of long and ring fingers with active triggering and locking with pain  She is able to make composite fist  Sensation intact             no images to review      Scribe Attestation    I,:  Juma Davalos am acting as a scribe while in the presence of the attending physician :       I,:  Francia Holliday MD personally performed the services described in this documentation    as scribed in my presence :

## 2022-04-13 ENCOUNTER — TELEPHONE (OUTPATIENT)
Dept: PAIN MEDICINE | Facility: CLINIC | Age: 62
End: 2022-04-13

## 2022-04-13 NOTE — TELEPHONE ENCOUNTER
S/W Pt about her appt on 5/5/22  She said that she want to cancel that appt  She said that she will call us back when she needs us      Per Patient    Thank You

## 2022-04-13 NOTE — TELEPHONE ENCOUNTER
S/w pt who stated that she does not feel that she needs the tfesi scheduled for tomorrow as she has been having increasing relief of her pain  Pt stated that at this point, she is able to get through the day, complete all her normal activities with some minor pain at hs  Pt confirmed that she is not holding any blood thinners  Advised pt, the procedure scheduled for tomorrow has been cancelled  Please cb if the pain returns / questions arise  Pt verbalized understandign and appreciation

## 2022-04-27 NOTE — PRE-PROCEDURE INSTRUCTIONS
Pre-Surgery Instructions:   Medication Instructions    atorvastatin (LIPITOR) 40 mg tablet Take night before surgery    Calcium Carbonate-Vitamin D (CALCIUM 600+D PO) Take night before surgery    meloxicam (Mobic) 15 mg tablet Take night before surgery    multivitamin (THERAGRAN) TABS Take night before surgery    Probiotic Product (Probiotic Daily) CAPS Take night before surgery    sulfaSALAzine (AZULFIDINE) 500 mg tablet Take day of surgery  Pre shower with hibiclens as instructed by surgeon  You may drive yourself to the hospital   You may take your medications day of surgery  You may eat on the day of your surgery  You may have a dressing, please wear something that will fit over it

## 2022-04-29 ENCOUNTER — HOSPITAL ENCOUNTER (OUTPATIENT)
Facility: HOSPITAL | Age: 62
Setting detail: OUTPATIENT SURGERY
Discharge: HOME | End: 2022-04-29
Attending: ORTHOPAEDIC SURGERY | Admitting: ORTHOPAEDIC SURGERY
Payer: COMMERCIAL

## 2022-04-29 VITALS
HEART RATE: 78 BPM | TEMPERATURE: 98.5 F | SYSTOLIC BLOOD PRESSURE: 153 MMHG | OXYGEN SATURATION: 97 % | DIASTOLIC BLOOD PRESSURE: 73 MMHG | RESPIRATION RATE: 18 BRPM

## 2022-04-29 DIAGNOSIS — M65.332 TRIGGER MIDDLE FINGER OF LEFT HAND: ICD-10-CM

## 2022-04-29 DIAGNOSIS — M65.342 TRIGGER RING FINGER OF LEFT HAND: Primary | ICD-10-CM

## 2022-04-29 PROCEDURE — 26055 INCISE FINGER TENDON SHEATH: CPT | Performed by: ORTHOPAEDIC SURGERY

## 2022-04-29 RX ORDER — BUPIVACAINE HYDROCHLORIDE 5 MG/ML
10 INJECTION, SOLUTION PERINEURAL ONCE
Status: DISCONTINUED | OUTPATIENT
Start: 2022-04-29 | End: 2022-04-29

## 2022-04-29 RX ORDER — BUPIVACAINE HYDROCHLORIDE 5 MG/ML
10 INJECTION, SOLUTION EPIDURAL; INTRACAUDAL ONCE
Status: COMPLETED | OUTPATIENT
Start: 2022-04-29 | End: 2022-04-29

## 2022-04-29 RX ORDER — CHLORHEXIDINE GLUCONATE 4 G/100ML
SOLUTION TOPICAL DAILY PRN
Status: DISCONTINUED | OUTPATIENT
Start: 2022-04-29 | End: 2022-04-29 | Stop reason: HOSPADM

## 2022-04-29 RX ORDER — ACETAMINOPHEN AND CODEINE PHOSPHATE 300; 30 MG/1; MG/1
1 TABLET ORAL EVERY 4 HOURS PRN
Qty: 10 TABLET | Refills: 0 | Status: SHIPPED | OUTPATIENT
Start: 2022-04-29 | End: 2022-05-09

## 2022-04-29 RX ORDER — ACETAMINOPHEN 325 MG/1
650 TABLET ORAL EVERY 6 HOURS PRN
Status: DISCONTINUED | OUTPATIENT
Start: 2022-04-29 | End: 2022-04-29 | Stop reason: HOSPADM

## 2022-04-29 RX ORDER — MAGNESIUM HYDROXIDE 1200 MG/15ML
LIQUID ORAL AS NEEDED
Status: DISCONTINUED | OUTPATIENT
Start: 2022-04-29 | End: 2022-04-29 | Stop reason: HOSPADM

## 2022-04-29 RX ORDER — LIDOCAINE HYDROCHLORIDE 10 MG/ML
10 INJECTION, SOLUTION EPIDURAL; INFILTRATION; INTRACAUDAL; PERINEURAL ONCE
Status: COMPLETED | OUTPATIENT
Start: 2022-04-29 | End: 2022-04-29

## 2022-04-29 RX ADMIN — LIDOCAINE HYDROCHLORIDE 2.5 ML: 10 INJECTION, SOLUTION EPIDURAL; INFILTRATION; INTRACAUDAL; PERINEURAL at 07:12

## 2022-04-29 RX ADMIN — BUPIVACAINE HYDROCHLORIDE 2.5 ML: 5 INJECTION, SOLUTION EPIDURAL; INTRACAUDAL at 07:12

## 2022-04-29 NOTE — OP NOTE
OPERATIVE REPORT  PATIENT NAME: Hilda Acosta    :  1960  MRN: [id number]  Pt Location:  OR ROOM 02    SURGERY DATE: 2022    Surgeon(s) and Role:     * Anusha Sánchez MD - Primary    Preop Diagnosis:  Trigger middle finger of left hand [M65 332]  Trigger ring finger of left hand [M65 342]    Post-Op Diagnosis Codes:     * Trigger middle finger of left hand [M65 332]     * Trigger ring finger of left hand [M65 342]    Procedure(s) (LRB):  RELEASE TRIGGER FINGER, LEFT MIDDLE AND RING (Left)    Specimen(s):  * No specimens in log *    Estimated Blood Loss:   Minimal    Drains:  * No LDAs found *    Anesthesia Type:   Local    Operative Indications:  Trigger middle finger of left hand [M65 332]  Trigger ring finger of left hand [M65 342]    Indication:  Hilda Acosta is a 63y o  years old female with left middleand ring trigger finger who fell conservative treatments  Patient elected surgical procedure  She understands the risks and complications with the surgery and consented to the surgery  Procedure and Technique:  Patient was brought into the OR  The left middleand index finger at the A1 pulley was anesthetized with 5 mL 50/50 Marcaine 0 5% and Lidocaine 1% mixture  The left hand was prepped and draped in a sterile fashion  A timeout is called and identified the left middle and index finger as operative site  The incision site was tested with pickups  Patient appeared to have adequate anesthesia  An Eschmar was used to exsanguinate the left hand and used as a tourniquet  A 1 cm horizontal incision was made and dissection was carried down to the A1 pulley on middle finger  Once the A1 pulley was identified, a longitudinal incision was made along the flexor tendon  Similar procedure was done at the ring finger  Care was made sure the tendons, nerves, and vessels were not injured  Once the A1 pulley was released, the patient was instructed to flex the fingers fully   There were no locking sensation of the fingers  The wound was then irrigated with saline  The incision was closed with 4-0 nylon  Sterile bulky dressing was applied  Patient tolerated the procedure well and transferred to the recovery room      There was no qualified resident available to assist     Complications:   None    Patient Disposition:  PACU     SIGNATURE: Mikal Barone MD  DATE: April 29, 2022  TIME: 7:53 AM

## 2022-04-29 NOTE — INTERVAL H&P NOTE
H&P reviewed  After examining the patient I find no changes in the patients condition since the H&P had been written      Vitals:    04/29/22 0705   BP: 166/84   Pulse: 80   Resp: 18   Temp: 98 5 °F (36 9 °C)   SpO2: 96%

## 2022-05-06 ENCOUNTER — OFFICE VISIT (OUTPATIENT)
Dept: OBGYN CLINIC | Facility: CLINIC | Age: 62
End: 2022-05-06

## 2022-05-06 VITALS
SYSTOLIC BLOOD PRESSURE: 120 MMHG | HEIGHT: 63 IN | BODY MASS INDEX: 35.44 KG/M2 | WEIGHT: 200 LBS | DIASTOLIC BLOOD PRESSURE: 80 MMHG

## 2022-05-06 DIAGNOSIS — Z47.89 AFTERCARE FOLLOWING SURGERY OF THE MUSCULOSKELETAL SYSTEM: Primary | ICD-10-CM

## 2022-05-06 PROCEDURE — 3008F BODY MASS INDEX DOCD: CPT | Performed by: ORTHOPAEDIC SURGERY

## 2022-05-06 PROCEDURE — 99024 POSTOP FOLLOW-UP VISIT: CPT | Performed by: ORTHOPAEDIC SURGERY

## 2022-05-06 NOTE — ASSESSMENT & PLAN NOTE
Patient is doing very well status post left long and ring trigger finger releases  Sutures removed and Steri-Strips were applied  She is to avoid soaking the hand for the next 1-2 weeks until incisions are completely healed  She may shower and wash her hands and pat dry  No heavy lifting or gripping  Follow-up in 4 weeks for re-evaluation  She may schedule right long and ring trigger finger releases at that visit if she is ready  All questions were answered to patient's satisfaction

## 2022-05-06 NOTE — PROGRESS NOTES
Assessment:     1  Aftercare following surgery of the musculoskeletal system        Plan:  The patient was seen and examined by Dr Alexandr Lai and myself     Problem List Items Addressed This Visit        Other    Aftercare following surgery of the musculoskeletal system - Primary     Patient is doing very well status post left long and ring trigger finger releases  Sutures removed and Steri-Strips were applied  She is to avoid soaking the hand for the next 1-2 weeks until incisions are completely healed  She may shower and wash her hands and pat dry  No heavy lifting or gripping  Follow-up in 4 weeks for re-evaluation  She may schedule right long and ring trigger finger releases at that visit if she is ready  All questions were answered to patient's satisfaction  Subjective:     Patient ID: Maria Eugenia Martinez is a 63 y o  female  Chief Complaint: This is a 82-year-old white female who is status post left long and ring trigger finger release April 29, 2022  She is doing very well  Triggering has resolved  She denies any fevers or chills  She has no pain and did not have to take any pain medicine after the surgery        Allergy:  No Known Allergies  Medications:  all current active meds have been reviewed  Past Medical History:  Past Medical History:   Diagnosis Date    Abnormal mammogram     RESOLVED [id number]    Arthritis     Depression     GERD (gastroesophageal reflux disease)     diet managed, occasional    Hallux rigidus     LAST ASSESSED [id number] L grreat toe    Heart murmur From birth    Hyperlipidemia     Lyme disease     LAST ASSESSED [id number]    Murmur, cardiac     functional    PONV (postoperative nausea and vomiting)     Trigger finger     ,r hand ring finger and middle; L same fingers    Wears glasses      Past Surgical History:  Past Surgical History:   Procedure Laterality Date    COLONOSCOPY      LAST ASSESSED [id number]    DILATION AND CURETTAGE OF UTERUS      ENDOMETRIAL ABLATION      ENDOMETRIAL CRYOABLATION      WITH ULTRASOUND GUIDE     IR EPIDURAL STEROID INJECTION      KNEE ARTHROSCOPY      b/l meniscus    ID FUSION BIG TOE,MT-P JT Left 1/18/2019    Procedure: GREAT TOE FUSION WITH PLATE;  Surgeon: Osvaldo Tamayo DPM;  Location: [facility];  Service: Podiatry    ID INCISE FINGER TENDON SHEATH Left 4/29/2022    Procedure: RELEASE TRIGGER FINGER, LEFT MIDDLE AND RING;  Surgeon: Abhinav Rowan MD;  Location: [facility];  Service: Orthopedics    TONSILLECTOMY AND ADENOIDECTOMY       Family History:  Family History   Problem Relation Age of Onset    Heart disease Mother     Colon polyps Mother     Stroke Mother     COPD Mother     Hypertension Father     Kidney failure Father     Hyperlipidemia Father     Coronary artery disease Family     Hypertension Family     Lung cancer Sister 61    No Known Problems Daughter     Breast cancer Maternal Grandmother 36    Breast cancer Paternal Grandmother [de-identified]    Breast cancer Maternal Aunt     No Known Problems Maternal Aunt     No Known Problems Maternal Aunt     No Known Problems Maternal Aunt     No Known Problems Paternal Aunt     No Known Problems Sister     No Known Problems Maternal Grandfather     Arthritis Paternal Grandfather     No Known Problems Son     Cancer Sister      Social History:  Social History     Substance and Sexual Activity   Alcohol Use Yes    Alcohol/week: 0 0 standard drinks    Comment: Very seldom     Social History     Substance and Sexual Activity   Drug Use No     Social History     Tobacco Use   Smoking Status Never Smoker   Smokeless Tobacco Never Used     Review of Systems   Constitutional: Negative  HENT: Negative  Eyes: Negative  Respiratory: Negative  Cardiovascular: Negative  Gastrointestinal: Negative  Endocrine: Negative  Genitourinary: Negative  Musculoskeletal: Positive for joint swelling  Negative for arthralgias  Skin: Negative  Allergic/Immunologic: Negative  Neurological: Negative  Hematological: Negative  Psychiatric/Behavioral: Negative  Objective:  BP Readings from Last 1 Encounters:   05/06/22 120/80      Wt Readings from Last 1 Encounters:   05/06/22 90 7 kg (200 lb)      BMI:   Estimated body mass index is 35 43 kg/m² as calculated from the following:    Height as of this encounter: 5' 3" (1 6 m)  Weight as of this encounter: 90 7 kg (200 lb)  BSA:   Estimated body surface area is 1 93 meters squared as calculated from the following:    Height as of this encounter: 5' 3" (1 6 m)  Weight as of this encounter: 90 7 kg (200 lb)  Physical Exam  Constitutional:       General: She is not in acute distress  Appearance: She is well-developed  HENT:      Head: Normocephalic  Eyes:      Conjunctiva/sclera: Conjunctivae normal       Pupils: Pupils are equal, round, and reactive to light  Pulmonary:      Effort: Pulmonary effort is normal  No respiratory distress  Skin:     General: Skin is warm and dry  Neurological:      Mental Status: She is alert and oriented to person, place, and time  Psychiatric:         Behavior: Behavior normal        Left Hand Exam     Range of Motion   Wrist   Extension: normal   Flexion: normal   Pronation: normal   Supination: normal     Other   Erythema: absent  Scars: present (Well healing incisions with no evidence of infection  No active drainage )  Sensation: normal  Pulse: present    Comments:  No active triggering  Can almost make a composite fist   Mild swelling of long and ring fingers            No new imaging  Procedure:  Sutures removed from left middle and ring fingers

## 2022-06-03 ENCOUNTER — OFFICE VISIT (OUTPATIENT)
Dept: OBGYN CLINIC | Facility: CLINIC | Age: 62
End: 2022-06-03

## 2022-06-03 VITALS
HEIGHT: 63 IN | WEIGHT: 197 LBS | BODY MASS INDEX: 34.91 KG/M2 | SYSTOLIC BLOOD PRESSURE: 120 MMHG | DIASTOLIC BLOOD PRESSURE: 72 MMHG

## 2022-06-03 DIAGNOSIS — Z47.89 AFTERCARE FOLLOWING SURGERY OF THE MUSCULOSKELETAL SYSTEM: ICD-10-CM

## 2022-06-03 DIAGNOSIS — M65.341 TRIGGER RING FINGER OF RIGHT HAND: ICD-10-CM

## 2022-06-03 DIAGNOSIS — M65.331 TRIGGER MIDDLE FINGER OF RIGHT HAND: Primary | ICD-10-CM

## 2022-06-03 PROBLEM — M65.332 TRIGGER MIDDLE FINGER OF LEFT HAND: Status: RESOLVED | Noted: 2022-04-12 | Resolved: 2022-06-03

## 2022-06-03 PROBLEM — M65.342 TRIGGER RING FINGER OF LEFT HAND: Status: RESOLVED | Noted: 2022-04-12 | Resolved: 2022-06-03

## 2022-06-03 PROCEDURE — 99024 POSTOP FOLLOW-UP VISIT: CPT | Performed by: ORTHOPAEDIC SURGERY

## 2022-06-03 RX ORDER — LIDOCAINE HYDROCHLORIDE 10 MG/ML
10 INJECTION, SOLUTION EPIDURAL; INFILTRATION; INTRACAUDAL; PERINEURAL ONCE
Status: CANCELLED | OUTPATIENT
Start: 2022-06-03 | End: 2022-06-03

## 2022-06-03 RX ORDER — BUPIVACAINE HYDROCHLORIDE 5 MG/ML
10 INJECTION, SOLUTION PERINEURAL ONCE
Status: CANCELLED | OUTPATIENT
Start: 2022-06-03 | End: 2022-06-03

## 2022-06-03 RX ORDER — CHLORHEXIDINE GLUCONATE 4 G/100ML
SOLUTION TOPICAL DAILY PRN
Status: CANCELLED | OUTPATIENT
Start: 2022-06-03

## 2022-06-03 NOTE — PROGRESS NOTES
Assessment:     1  Trigger middle finger of right hand    2  Trigger ring finger of right hand    3  Aftercare following surgery of the musculoskeletal system        Plan:     Problem List Items Addressed This Visit        Musculoskeletal and Integument    Trigger middle finger of right hand - Primary    Relevant Orders    Case request operating room: RELEASE TRIGGER FINGER, RIGHT MIDDLE AND RING FINGER (Completed)    Trigger ring finger of right hand    Relevant Orders    Case request operating room: RELEASE TRIGGER FINGER, RIGHT MIDDLE AND RING FINGER (Completed)       Other    Aftercare following surgery of the musculoskeletal system        Status post left long and ring trigger finger release April 29, 2022 doing very well, triggering resolved  Continue to work on scar massage with vitamin E on incisions  Patient wishes to schedule RIGHT long and ring trigger finger releases  She continues with active trigger and locking on daily basis  All risks reviewed with patient  Patient will follow up post operatively as scheduled  All patient's questions were answered to her satisfaction  This note is created using dictation transcription  It may contain typographical errors, grammatical errors, improperly dictated words, background noise and other errors  Discussed with patient surgical risks and complications including but not limited to infection, persistent pain, nerve and vessel injury, complications associated with anesthesia, DVT, exposure to COVID virus, etc   Patient understands the risks and complication and consented to the surgery  Subjective:     Patient ID: Konstantin Martinez is a 63 y o  female  Chief Complaint: This is a 82-year-old white female who is status post left long and ring trigger finger release April 29, 2022  She is doing very well  Triggering has resolved    She continues with active triggering and locking in right long and ring fingers and would like to discuss trigger finger releases today  She has had cortisone injection with no relief of symptoms  Denies any numbness or tingling in right hand       Allergy:  No Known Allergies  Medications:  all current active meds have been reviewed  Past Medical History:  Past Medical History:   Diagnosis Date    Abnormal mammogram     RESOLVED [id number]    Arthritis     Depression     GERD (gastroesophageal reflux disease)     diet managed, occasional    Hallux rigidus     LAST ASSESSED [id number] L grreat toe    Heart murmur From birth    Hyperlipidemia     Lyme disease     LAST ASSESSED [id number]    Murmur, cardiac     functional    PONV (postoperative nausea and vomiting)     Trigger finger     ,r hand ring finger and middle; L same fingers    Trigger middle finger of left hand 4/12/2022    Trigger ring finger of left hand 4/12/2022    Wears glasses      Past Surgical History:  Past Surgical History:   Procedure Laterality Date    COLONOSCOPY      LAST ASSESSED [id number]    DILATION AND CURETTAGE OF UTERUS      ENDOMETRIAL ABLATION      ENDOMETRIAL CRYOABLATION      WITH ULTRASOUND GUIDE     IR EPIDURAL STEROID INJECTION      KNEE ARTHROSCOPY      b/l meniscus    MD FUSION BIG TOE,MT-P JT Left 1/18/2019    Procedure: GREAT TOE FUSION WITH PLATE;  Surgeon: Noris Wallace DPM;  Location: [facility];  Service: Podiatry    MD INCISE FINGER TENDON SHEATH Left 4/29/2022    Procedure: RELEASE TRIGGER FINGER, LEFT MIDDLE  [address];  Surgeon: Yaneth Campbell MD;  Location: [facility];  Service: Orthopedics    TONSILLECTOMY AND ADENOIDECTOMY       Family History:  Family History   Problem Relation Age of Onset    Heart disease Mother     Colon polyps Mother     Stroke Mother     COPD Mother     Hypertension Father     Kidney failure Father     Hyperlipidemia Father     Coronary artery disease Family     Hypertension Family     Lung cancer Sister 61    No Known Problems Daughter     Breast cancer Maternal Grandmother 36    Breast cancer Paternal Grandmother [de-identified]    Breast cancer Maternal Aunt     No Known Problems Maternal Aunt     No Known Problems Maternal Aunt     No Known Problems Maternal Aunt     No Known Problems Paternal Aunt     No Known Problems Sister     No Known Problems Maternal Grandfather     Arthritis Paternal Grandfather     No Known Problems Son     Cancer Sister      Social History:  Social History     Substance and Sexual Activity   Alcohol Use Yes    Alcohol/week: 0 0 standard drinks    Comment: Very seldom     Social History     Substance and Sexual Activity   Drug Use No     Social History     Tobacco Use   Smoking Status Never Smoker   Smokeless Tobacco Never Used     Review of Systems   Constitutional: Negative for chills and fever  HENT: Negative for ear pain and sore throat  Eyes: Negative for pain and visual disturbance  Respiratory: Negative for cough and shortness of breath  Cardiovascular: Negative for chest pain and palpitations  Gastrointestinal: Negative for abdominal pain and vomiting  Genitourinary: Negative for dysuria and hematuria  Musculoskeletal: Positive for arthralgias (Right middle and ring fingers)  Negative for back pain  Skin: Negative for color change and rash  Neurological: Negative for seizures and syncope  Psychiatric/Behavioral: Negative  All other systems reviewed and are negative  Objective:  BP Readings from Last 1 Encounters:   06/03/22 120/72      Wt Readings from Last 1 Encounters:   06/03/22 89 4 kg (197 lb)      BMI:   Estimated body mass index is 34 9 kg/m² as calculated from the following:    Height as of this encounter: 5' 3" (1 6 m)  Weight as of this encounter: 89 4 kg (197 lb)  BSA:   Estimated body surface area is 1 92 meters squared as calculated from the following:    Height as of this encounter: 5' 3" (1 6 m)  Weight as of this encounter: 89 4 kg (197 lb)  Physical Exam  Vitals and nursing note reviewed     Constitutional: Appearance: Normal appearance  She is well-developed  HENT:      Head: Normocephalic and atraumatic  Right Ear: External ear normal       Left Ear: External ear normal    Eyes:      Extraocular Movements: Extraocular movements intact  Conjunctiva/sclera: Conjunctivae normal       Pupils: Pupils are equal, round, and reactive to light  Cardiovascular:      Rate and Rhythm: Regular rhythm  Heart sounds: Normal heart sounds  No murmur heard  No gallop  Pulmonary:      Effort: Pulmonary effort is normal       Breath sounds: Normal breath sounds  Abdominal:      Palpations: Abdomen is soft  Musculoskeletal:         General: Tenderness (right hand arthralgia ) present  Cervical back: Normal range of motion and neck supple  Skin:     General: Skin is warm and dry  Neurological:      Mental Status: She is alert and oriented to person, place, and time  Deep Tendon Reflexes: Reflexes are normal and symmetric  Psychiatric:         Mood and Affect: Mood normal          Behavior: Behavior normal        Right Hand Exam     Tenderness   Right hand tenderness location: Middle and ring finger A1 pulley  Range of Motion   The patient has normal right wrist ROM  Muscle Strength   Wrist extension: 5/5   Wrist flexion: 5/5   : 5/5     Tests   Phalens Sign: negative  Tinel's sign (median nerve): negative  Finkelstein's test: negative    Other   Erythema: absent  Scars: absent  Sensation: normal  Pulse: present    Comments:  Right long and ring trigger fingers with palpable nodules and pain along A-1 pulleys with active triggering and locking  Sensation intact throughout hand       Left Hand Exam     Tenderness   The patient is experiencing no tenderness  Range of Motion   The patient has normal left wrist ROM  Muscle Strength   The patient has normal left wrist strength      Tests   Phalens Sign: negative  Tinel's sign (median nerve): negative  Finkelstein's test: negative    Other   Erythema: absent  Scars: present (well healed incisions )  Sensation: normal  Pulse: present            no new images to review      Scribe Attestation    I,:  Shilpa Jones am acting as a scribe while in the presence of the attending physician :       I,:  Abhinav Rowan MD personally performed the services described in this documentation    as scribed in my presence :

## 2022-06-03 NOTE — H&P (VIEW-ONLY)
Assessment:     1  Trigger middle finger of right hand    2  Trigger ring finger of right hand    3  Aftercare following surgery of the musculoskeletal system        Plan:     Problem List Items Addressed This Visit        Musculoskeletal and Integument    Trigger middle finger of right hand - Primary    Relevant Orders    Case request operating room: RELEASE TRIGGER FINGER, RIGHT MIDDLE AND RING FINGER (Completed)    Trigger ring finger of right hand    Relevant Orders    Case request operating room: RELEASE TRIGGER FINGER, RIGHT MIDDLE AND RING FINGER (Completed)       Other    Aftercare following surgery of the musculoskeletal system        Status post left long and ring trigger finger release April 29, 2022 doing very well, triggering resolved  Continue to work on scar massage with vitamin E on incisions  Patient wishes to schedule RIGHT long and ring trigger finger releases  She continues with active trigger and locking on daily basis  All risks reviewed with patient  Patient will follow up post operatively as scheduled  All patient's questions were answered to her satisfaction  This note is created using dictation transcription  It may contain typographical errors, grammatical errors, improperly dictated words, background noise and other errors  Discussed with patient surgical risks and complications including but not limited to infection, persistent pain, nerve and vessel injury, complications associated with anesthesia, DVT, exposure to COVID virus, etc   Patient understands the risks and complication and consented to the surgery  Subjective:     Patient ID: Caridad Manzanares is a 63 y o  female  Chief Complaint: This is a 72-year-old white female who is status post left long and ring trigger finger release April 29, 2022  She is doing very well  Triggering has resolved    She continues with active triggering and locking in right long and ring fingers and would like to discuss trigger finger releases today  She has had cortisone injection with no relief of symptoms  Denies any numbness or tingling in right hand       Allergy:  No Known Allergies  Medications:  all current active meds have been reviewed  Past Medical History:  Past Medical History:   Diagnosis Date    Abnormal mammogram     RESOLVED [id number]    Arthritis     Depression     GERD (gastroesophageal reflux disease)     diet managed, occasional    Hallux rigidus     LAST ASSESSED [id number] L grreat toe    Heart murmur From birth    Hyperlipidemia     Lyme disease     LAST ASSESSED [id number]    Murmur, cardiac     functional    PONV (postoperative nausea and vomiting)     Trigger finger     ,r hand ring finger and middle; L same fingers    Trigger middle finger of left hand 4/12/2022    Trigger ring finger of left hand 4/12/2022    Wears glasses      Past Surgical History:  Past Surgical History:   Procedure Laterality Date    COLONOSCOPY      LAST ASSESSED [id number]    DILATION AND CURETTAGE OF UTERUS      ENDOMETRIAL ABLATION      ENDOMETRIAL CRYOABLATION      WITH ULTRASOUND GUIDE     IR EPIDURAL STEROID INJECTION      KNEE ARTHROSCOPY      b/l meniscus    CT FUSION BIG TOE,MT-P JT Left 1/18/2019    Procedure: GREAT TOE FUSION WITH PLATE;  Surgeon: Niki Shin DPM;  Location: [facility];  Service: Podiatry    CT INCISE FINGER TENDON SHEATH Left 4/29/2022    Procedure: RELEASE TRIGGER FINGER, LEFT MIDDLE  [address];  Surgeon: Jocelyne Montilla MD;  Location: [facility];  Service: Orthopedics    TONSILLECTOMY AND ADENOIDECTOMY       Family History:  Family History   Problem Relation Age of Onset    Heart disease Mother     Colon polyps Mother     Stroke Mother     COPD Mother     Hypertension Father     Kidney failure Father     Hyperlipidemia Father     Coronary artery disease Family     Hypertension Family     Lung cancer Sister 61    No Known Problems Daughter     Breast cancer Maternal Grandmother 36    Breast cancer Paternal Grandmother [de-identified]    Breast cancer Maternal Aunt     No Known Problems Maternal Aunt     No Known Problems Maternal Aunt     No Known Problems Maternal Aunt     No Known Problems Paternal Aunt     No Known Problems Sister     No Known Problems Maternal Grandfather     Arthritis Paternal Grandfather     No Known Problems Son     Cancer Sister      Social History:  Social History     Substance and Sexual Activity   Alcohol Use Yes    Alcohol/week: 0 0 standard drinks    Comment: Very seldom     Social History     Substance and Sexual Activity   Drug Use No     Social History     Tobacco Use   Smoking Status Never Smoker   Smokeless Tobacco Never Used     Review of Systems   Constitutional: Negative for chills and fever  HENT: Negative for ear pain and sore throat  Eyes: Negative for pain and visual disturbance  Respiratory: Negative for cough and shortness of breath  Cardiovascular: Negative for chest pain and palpitations  Gastrointestinal: Negative for abdominal pain and vomiting  Genitourinary: Negative for dysuria and hematuria  Musculoskeletal: Positive for arthralgias (Right middle and ring fingers)  Negative for back pain  Skin: Negative for color change and rash  Neurological: Negative for seizures and syncope  Psychiatric/Behavioral: Negative  All other systems reviewed and are negative  Objective:  BP Readings from Last 1 Encounters:   06/03/22 120/72      Wt Readings from Last 1 Encounters:   06/03/22 89 4 kg (197 lb)      BMI:   Estimated body mass index is 34 9 kg/m² as calculated from the following:    Height as of this encounter: 5' 3" (1 6 m)  Weight as of this encounter: 89 4 kg (197 lb)  BSA:   Estimated body surface area is 1 92 meters squared as calculated from the following:    Height as of this encounter: 5' 3" (1 6 m)  Weight as of this encounter: 89 4 kg (197 lb)  Physical Exam  Vitals and nursing note reviewed     Constitutional: Appearance: Normal appearance  She is well-developed  HENT:      Head: Normocephalic and atraumatic  Right Ear: External ear normal       Left Ear: External ear normal    Eyes:      Extraocular Movements: Extraocular movements intact  Conjunctiva/sclera: Conjunctivae normal       Pupils: Pupils are equal, round, and reactive to light  Cardiovascular:      Rate and Rhythm: Regular rhythm  Heart sounds: Normal heart sounds  No murmur heard  No gallop  Pulmonary:      Effort: Pulmonary effort is normal       Breath sounds: Normal breath sounds  Abdominal:      Palpations: Abdomen is soft  Musculoskeletal:         General: Tenderness (right hand arthralgia ) present  Cervical back: Normal range of motion and neck supple  Skin:     General: Skin is warm and dry  Neurological:      Mental Status: She is alert and oriented to person, place, and time  Deep Tendon Reflexes: Reflexes are normal and symmetric  Psychiatric:         Mood and Affect: Mood normal          Behavior: Behavior normal        Right Hand Exam     Tenderness   Right hand tenderness location: Middle and ring finger A1 pulley  Range of Motion   The patient has normal right wrist ROM  Muscle Strength   Wrist extension: 5/5   Wrist flexion: 5/5   : 5/5     Tests   Phalens Sign: negative  Tinel's sign (median nerve): negative  Finkelstein's test: negative    Other   Erythema: absent  Scars: absent  Sensation: normal  Pulse: present    Comments:  Right long and ring trigger fingers with palpable nodules and pain along A-1 pulleys with active triggering and locking  Sensation intact throughout hand       Left Hand Exam     Tenderness   The patient is experiencing no tenderness  Range of Motion   The patient has normal left wrist ROM  Muscle Strength   The patient has normal left wrist strength      Tests   Phalens Sign: negative  Tinel's sign (median nerve): negative  Finkelstein's test: negative    Other   Erythema: absent  Scars: present (well healed incisions )  Sensation: normal  Pulse: present            no new images to review      Scribe Attestation    I,:  Kee aBrros am acting as a scribe while in the presence of the attending physician :       I,:  Ran Bonilla MD personally performed the services described in this documentation    as scribed in my presence :

## 2022-06-14 ENCOUNTER — OFFICE VISIT (OUTPATIENT)
Dept: URGENT CARE | Facility: CLINIC | Age: 62
End: 2022-06-14
Payer: COMMERCIAL

## 2022-06-14 VITALS
BODY MASS INDEX: 34.55 KG/M2 | SYSTOLIC BLOOD PRESSURE: 122 MMHG | HEART RATE: 88 BPM | HEIGHT: 63 IN | TEMPERATURE: 99.8 F | WEIGHT: 195 LBS | OXYGEN SATURATION: 96 % | DIASTOLIC BLOOD PRESSURE: 84 MMHG | RESPIRATION RATE: 16 BRPM

## 2022-06-14 DIAGNOSIS — J40 BRONCHITIS: Primary | ICD-10-CM

## 2022-06-14 DIAGNOSIS — R07.89 CHEST TIGHTNESS: ICD-10-CM

## 2022-06-14 PROCEDURE — 99213 OFFICE O/P EST LOW 20 MIN: CPT | Performed by: PHYSICIAN ASSISTANT

## 2022-06-14 RX ORDER — PREDNISONE 20 MG/1
40 TABLET ORAL DAILY
Qty: 6 TABLET | Refills: 0 | Status: SHIPPED | OUTPATIENT
Start: 2022-06-14 | End: 2022-06-17

## 2022-06-14 RX ORDER — AZITHROMYCIN 250 MG/1
TABLET, FILM COATED ORAL
Qty: 6 TABLET | Refills: 0 | Status: SHIPPED | OUTPATIENT
Start: 2022-06-14 | End: 2022-06-18

## 2022-06-14 RX ORDER — BROMPHENIRAMINE MALEATE, PSEUDOEPHEDRINE HYDROCHLORIDE, AND DEXTROMETHORPHAN HYDROBROMIDE 2; 30; 10 MG/5ML; MG/5ML; MG/5ML
2.5 SYRUP ORAL 3 TIMES DAILY PRN
Qty: 120 ML | Refills: 0 | Status: SHIPPED | OUTPATIENT
Start: 2022-06-14 | End: 2022-08-02

## 2022-06-14 NOTE — PATIENT INSTRUCTIONS
Acute Bronchitis   AMBULATORY CARE:   Acute bronchitis  is swelling and irritation in your lungs  It is usually caused by a virus and most often happens in the winter  Bronchitis may also be caused by bacteria or by a chemical irritant, such as smoke  Common symptoms include the following:   Cough that lasts up to 3 weeks, stuffy nose    Hoarseness, sore throat    A fever and chills    Feeling more tired than usual, and body aches    Wheezing or pain when you breathe or cough    Seek care immediately if:   You cough up blood  Your lips or fingernails turn blue  You feel like you are not getting enough air when you breathe  Call your doctor if:   Your symptoms do not go away or get worse, even after treatment  Your cough does not get better within 4 weeks  You have questions or concerns about your condition or care  Medicines: You may  need any of the following:  Cough suppressants  decrease your urge to cough  Decongestants  help loosen mucus in your lungs and make it easier to cough up  This can help you breathe easier  Inhalers  may be given  Your healthcare provider may give you one or more inhalers to help you breathe easier and cough less  An inhaler gives your medicine to open your airways  Ask your healthcare provider to show you how to use your inhaler correctly  Antibiotics  may be given for up to 5 days if your bronchitis is caused by bacteria  Acetaminophen  decreases pain and fever  It is available without a doctor's order  Ask how much to take and how often to take it  Follow directions  Read the labels of all other medicines you are using to see if they also contain acetaminophen, or ask your doctor or pharmacist  Acetaminophen can cause liver damage if not taken correctly  Do not use more than 4 grams (4,000 milligrams) total of acetaminophen in one day  NSAIDs  help decrease swelling and pain or fever   This medicine is available with or without a doctor's order  NSAIDs can cause stomach bleeding or kidney problems in certain people  If you take blood thinner medicine, always ask your healthcare provider if NSAIDs are safe for you  Always read the medicine label and follow directions  Take your medicine as directed  Contact your healthcare provider if you think your medicine is not helping or if you have side effects  Tell him of her if you are allergic to any medicine  Keep a list of the medicines, vitamins, and herbs you take  Include the amounts, and when and why you take them  Bring the list or the pill bottles to follow-up visits  Carry your medicine list with you in case of an emergency  Self-care:   Drink liquids as directed  You may need to drink more liquids than usual to stay hydrated  Ask how much liquid to drink each day and which liquids are best for you  Use a cool mist humidifier  to increase air moisture in your home  This may make it easier for you to breathe and help decrease your cough  Get more rest   Rest helps your body to heal  Slowly start to do more each day  Rest when you feel it is needed  Avoid irritants in the air  Avoid chemicals, fumes, and dust  Wear a face mask if you must work around dust or fumes  Stay inside on days when air pollution levels are high  If you have allergies, stay inside when pollen counts are high  Do not use aerosol products, such as spray-on deodorant, bug spray, and hair spray  Do not smoke or be around others who are smoking  Nicotine and other chemicals in cigarettes and cigars can cause lung damage  Ask your healthcare provider for information if you currently smoke and need help to quit  E-cigarettes or smokeless tobacco still contain nicotine  Talk to your healthcare provider before you use these products  Prevent acute bronchitis:       Ask about vaccines you may need  Get a flu vaccine each year as soon as recommended, usually in September or October   Ask your healthcare provider if you should also get a pneumonia or COVID-19 vaccine  Your healthcare provider can tell you if you should also get other vaccines, and when to get them  Prevent the spread of germs  You can decrease your risk for acute bronchitis and other illnesses by doing the following:    Wash your hands often with soap and water  Carry germ-killing hand lotion or gel with you  You can use the lotion or gel to clean your hands when soap and water are not available  Do not touch your eyes, nose, or mouth unless you have washed your hands first     Always cover your mouth when you cough to prevent the spread of germs  It is best to cough into a tissue or your shirt sleeve instead of into your hand  Ask those around you to cover their mouths when they cough  Try to avoid people who have a cold or the flu  If you are sick, stay away from others as much as possible  Follow up with your doctor as directed:  Write down questions you have so you will remember to ask them during your follow-up visits  © Copyright [employer] 2022 Information is for End User's use only and may not be sold, redistributed or otherwise used for commercial purposes  All illustrations and images included in CareNotes® are the copyrighted property of A [employer]  or Shawn Damon   The above information is an  only  It is not intended as medical advice for individual conditions or treatments  Talk to your doctor, nurse or pharmacist before following any medical regimen to see if it is safe and effective for you

## 2022-06-14 NOTE — PROGRESS NOTES
[facility]        NAME: Kailee Koenig is a 63 y o  female  : 1960    MRN: [id number]  DATE: 2022  TIME: 6:20 AM    Assessment and Plan   Bronchitis [J40]  1  Bronchitis  azithromycin (ZITHROMAX) 250 mg tablet    predniSONE 20 mg tablet    brompheniramine-pseudoephedrine-DM 30-2-10 MG/5ML syrup   2  Chest tightness  predniSONE 20 mg tablet         Patient Instructions       Follow up with PCP in 3-5 days  Proceed to  ER if symptoms worsen  Chief Complaint     Chief Complaint   Patient presents with    Cough     Pt reports cough, productive at times for approx one week  History of Present Illness       63year old Female presents the clinic with cough that started 1 week ago  Patient notes the cough is productive at times  Patient denies fevers, chills, nausea, vomiting, diarrhea  Patient is concerned because she is having surgery in 1 week to her hand and does not want to have this surgery canceled due to illness  Review of Systems   Review of Systems   Constitutional: Negative for chills and fever  HENT: Negative for congestion, ear pain, rhinorrhea and sore throat  Respiratory: Positive for cough and chest tightness  Negative for wheezing  Skin: Negative for rash  Neurological: Positive for headaches  Negative for dizziness and light-headedness  All other systems reviewed and are negative          Current Medications       Current Outpatient Medications:     atorvastatin (LIPITOR) 40 mg tablet, Take 1 tablet (40 mg total) by mouth daily at bedtime, Disp: 90 tablet, Rfl: 1    brompheniramine-pseudoephedrine-DM 30-2-10 MG/5ML syrup, Take 2 5 mL by mouth 3 (three) times a day as needed for congestion or cough, Disp: 120 mL, Rfl: 0    Calcium Carbonate-Vitamin D (CALCIUM 600+D PO), Take 1 tablet by mouth daily, Disp: , Rfl:     multivitamin (THERAGRAN) TABS, Take 1 tablet by mouth daily at bedtime  , Disp: , Rfl:     sulfaSALAzine (AZULFIDINE) 500 mg tablet, Take 1 tablet (500 mg total) by mouth 2 (two) times a day, Disp: 60 tablet, Rfl: 5    acetaminophen-codeine (TYLENOL #3) 300-30 mg per tablet, Take 1 tablet by mouth every 4 (four) hours as needed for moderate pain for up to 10 days Continue current therapy  , Disp: 10 tablet, Rfl: 0    meloxicam (Mobic) 15 mg tablet, Take 1 tablet (15 mg total) by mouth daily (Patient taking differently: Take 15 mg by mouth daily at bedtime  ), Disp: 30 tablet, Rfl: 0    Probiotic Product (Probiotic Daily) CAPS, Take by mouth (Patient not taking: Reported on 6/14/2022), Disp: , Rfl:     Current Allergies     Allergies as of 06/14/2022    (No Known Allergies)            The following portions of the patient's history were reviewed and updated as appropriate: allergies, current medications, past family history, past medical history, past social history, past surgical history and problem list      Past Medical History:   Diagnosis Date    Abnormal mammogram     RESOLVED [id number]    Arthritis     Depression     GERD (gastroesophageal reflux disease)     diet managed, occasional    Hallux rigidus     LAST ASSESSED [id number] L grreat toe    Heart murmur From birth    Hyperlipidemia     Lyme disease     LAST ASSESSED [id number]    Murmur, cardiac     functional    PONV (postoperative nausea and vomiting)     Trigger finger     ,r hand ring finger and middle; L same fingers    Trigger middle finger of left hand 4/12/2022    Trigger ring finger of left hand 4/12/2022    Wears glasses        Past Surgical History:   Procedure Laterality Date    COLONOSCOPY      LAST ASSESSED [id number]    DILATION AND CURETTAGE OF UTERUS      ENDOMETRIAL ABLATION      ENDOMETRIAL CRYOABLATION      WITH ULTRASOUND GUIDE     IR EPIDURAL STEROID INJECTION      KNEE ARTHROSCOPY      b/l meniscus    [place] FUSION BIG TOE,MT-P JT Left 1/18/2019    Procedure: GREAT TOE FUSION WITH PLATE;  Surgeon: Dora Null DPM;  Location: [facility]; Service: Podiatry    ME INCISE FINGER TENDON SHEATH Left 4/29/2022    Procedure: RELEASE TRIGGER FINGER, LEFT MIDDLE AND RING;  Surgeon: Ryan Solano MD;  Location:  MAIN OR;  Service: Orthopedics    ME INCISE FINGER TENDON SHEATH Right 6/22/2022    Procedure: RELEASE TRIGGER FINGER, RIGHT MIDDLE AND RING FINGER;  Surgeon: Ryan Solano MD;  Location:  MAIN OR;  Service: Orthopedics    TONSILLECTOMY AND ADENOIDECTOMY         Family History   Problem Relation Age of Onset    Heart disease Mother     Colon polyps Mother     Stroke Mother     COPD Mother     Hypertension Father     Kidney failure Father     Hyperlipidemia Father     Coronary artery disease Family     Hypertension Family     Lung cancer Sister 61    No Known Problems Daughter     Breast cancer Maternal Grandmother 36    Breast cancer Paternal Grandmother [address] [place] [address]    Breast cancer Maternal Aunt     No Known Problems Maternal Aunt     No Known Problems Maternal Aunt     No Known Problems Maternal Aunt     No Known Problems Paternal Aunt     No Known Problems Sister     No Known Problems Maternal Grandfather     Arthritis Paternal Grandfather     No Known Problems Son     Cancer Sister          Medications have been verified  Objective   /84   Pulse 88   Temp 99 8 °F (37 7 °C)   Resp 16   Ht 5' 3" (1 6 m)   Wt 88 5 kg (195 lb)   LMP  (LMP Unknown)   SpO2 96%   BMI 34 54 kg/m²   No LMP recorded (lmp unknown)  Patient is postmenopausal        Physical Exam     Physical Exam  Vitals and nursing note reviewed  Constitutional:       General: She is not in acute distress  Appearance: She is well-developed  She is not diaphoretic  HENT:      Head: Normocephalic and atraumatic  Mouth/Throat:      Pharynx: Uvula midline  Posterior oropharyngeal erythema (PND) present  Cardiovascular:      Rate and Rhythm: Normal rate and regular rhythm  Heart sounds: Normal heart sounds     Pulmonary:      Effort: Pulmonary effort is normal       Breath sounds: Normal breath sounds  Musculoskeletal:         General: Normal range of motion  Neurological:      Mental Status: She is alert and oriented to person, place, and time

## 2022-06-22 ENCOUNTER — HOSPITAL ENCOUNTER (OUTPATIENT)
Facility: HOSPITAL | Age: 62
Setting detail: OUTPATIENT SURGERY
Discharge: HOME | End: 2022-06-22
Attending: ORTHOPAEDIC SURGERY | Admitting: ORTHOPAEDIC SURGERY
Payer: COMMERCIAL

## 2022-06-22 VITALS
SYSTOLIC BLOOD PRESSURE: 135 MMHG | OXYGEN SATURATION: 96 % | TEMPERATURE: 98 F | DIASTOLIC BLOOD PRESSURE: 64 MMHG | HEART RATE: 80 BPM | RESPIRATION RATE: 12 BRPM

## 2022-06-22 DIAGNOSIS — M65.341 TRIGGER RING FINGER OF RIGHT HAND: Primary | ICD-10-CM

## 2022-06-22 DIAGNOSIS — M65.331 TRIGGER MIDDLE FINGER OF RIGHT HAND: ICD-10-CM

## 2022-06-22 PROCEDURE — 26055 INCISE FINGER TENDON SHEATH: CPT | Performed by: ORTHOPAEDIC SURGERY

## 2022-06-22 RX ORDER — ACETAMINOPHEN 325 MG/1
650 TABLET ORAL EVERY 6 HOURS PRN
Status: DISCONTINUED | OUTPATIENT
Start: 2022-06-22 | End: 2022-06-22 | Stop reason: HOSPADM

## 2022-06-22 RX ORDER — ACETAMINOPHEN AND CODEINE PHOSPHATE 300; 30 MG/1; MG/1
1 TABLET ORAL EVERY 4 HOURS PRN
Qty: 10 TABLET | Refills: 0 | Status: SHIPPED | OUTPATIENT
Start: 2022-06-22 | End: 2022-07-02

## 2022-06-22 RX ORDER — CHLORHEXIDINE GLUCONATE 4 G/100ML
SOLUTION TOPICAL DAILY PRN
Status: DISCONTINUED | OUTPATIENT
Start: 2022-06-22 | End: 2022-06-22 | Stop reason: HOSPADM

## 2022-06-22 RX ORDER — LIDOCAINE HYDROCHLORIDE 10 MG/ML
10 INJECTION, SOLUTION EPIDURAL; INFILTRATION; INTRACAUDAL; PERINEURAL ONCE
Status: COMPLETED | OUTPATIENT
Start: 2022-06-22 | End: 2022-06-22

## 2022-06-22 RX ORDER — BUPIVACAINE HYDROCHLORIDE 5 MG/ML
10 INJECTION, SOLUTION EPIDURAL; INTRACAUDAL ONCE
Status: COMPLETED | OUTPATIENT
Start: 2022-06-22 | End: 2022-06-22

## 2022-06-22 RX ADMIN — BUPIVACAINE HYDROCHLORIDE 2.5 ML: 5 INJECTION, SOLUTION EPIDURAL; INTRACAUDAL at 07:55

## 2022-06-22 RX ADMIN — LIDOCAINE HYDROCHLORIDE 2.5 ML: 10 INJECTION, SOLUTION EPIDURAL; INFILTRATION; INTRACAUDAL at 07:55

## 2022-06-22 NOTE — OP NOTE
OPERATIVE REPORT  PATIENT NAME: Richy Braun    :  1960  MRN: [id number]  Pt Location:  OR ROOM 01    SURGERY DATE: 2022    Surgeon(s) and Role:     * Rosalia Akins MD - Primary    Preop Diagnosis:  Trigger middle finger of right hand [M65 331]  Trigger ring finger of right hand [M65 341]    Post-Op Diagnosis Codes:     * Trigger middle finger of right hand [M65 331]     * Trigger ring finger of right hand [M65 341]    Procedure(s) (LRB):  RELEASE TRIGGER FINGER, RIGHT MIDDLE AND RING FINGER (Right)    Specimen(s):  * No specimens in log *    Estimated Blood Loss:   Minimal    Drains:  * No LDAs found *    Anesthesia Type:   Local    Operative Indications:  Trigger middle finger of right hand [M65 331]  Trigger ring finger of right hand [M65 341]    Indication:  Richy Braun is a 63y o  years old female with right middle and ring trigger fingers who fail conservative treatments  Patient elected surgical procedure  She understands the risks and complications with the surgery and consented to the surgery  Procedure and Technique:  Patient was brought into the OR  The right middle and ring finger at the A1 pulley was anesthetized with 2 5 mL 50/50 Marcaine 0 5% and Lidocaine 1% mixture at each finger  The right hand was prepped and draped in a sterile fashion  A timeout is called and identified the right middle and ring fingers as operative site  The incision site was tested with pickups  Patient appeared to have adequate anesthesia  An Eschmar was used to exsanguinate the right hand and used as a tourniquet  A 1 cm horizontal incision was made and dissection was carried down to the A1 pulley of middle finger  Once the A1 pulley was identified, a longitudinal incision was made along the flexor tendon  Care was made sure the tendon was not injured  Similar procedure was done to the ring finger A1 pulley  Once the A1 pulley was released, the patient was instructed to flex the fingers fully   There were no locking sensation of the fingers  The wound was then irrigated with saline  The incision was closed with 4-0 nylon  Sterile bulky dressing was applied  Patient tolerated the procedure well and transferred to the recovery room      There was no qualified resident available to assist     Complications:   None    Patient Disposition:  PACU       SIGNATURE: Rachel Bonds MD  DATE: June 22, 2022  TIME: 8:34 AM

## 2022-06-22 NOTE — INTERVAL H&P NOTE
H&P reviewed  After examining the patient I find no changes in the patients condition since the H&P had been written      Vitals:    06/22/22 0740   BP: 135/64   Pulse: 82   Resp: 18   Temp: (!) 97 °F (36 1 °C)   SpO2: 96%

## 2022-06-29 ENCOUNTER — OFFICE VISIT (OUTPATIENT)
Dept: OBGYN CLINIC | Facility: CLINIC | Age: 62
End: 2022-06-29

## 2022-06-29 VITALS
WEIGHT: 195 LBS | HEIGHT: 63 IN | BODY MASS INDEX: 34.55 KG/M2 | DIASTOLIC BLOOD PRESSURE: 80 MMHG | SYSTOLIC BLOOD PRESSURE: 128 MMHG

## 2022-06-29 DIAGNOSIS — Z47.89 AFTERCARE FOLLOWING SURGERY OF THE MUSCULOSKELETAL SYSTEM: Primary | ICD-10-CM

## 2022-06-29 PROCEDURE — 99024 POSTOP FOLLOW-UP VISIT: CPT | Performed by: ORTHOPAEDIC SURGERY

## 2022-06-29 NOTE — ASSESSMENT & PLAN NOTE
Patient is doing very well status post right long and ring trigger finger releases  Sutures removed  Advised patient to avoid soaking the hand the next 1-2 weeks until incisions are completely healed  Continue to work on active and passive range of motion  No heavy lifting or gripping  Follow-up in 4 weeks for re-evaluation  All questions were answered to patient's satisfaction

## 2022-06-29 NOTE — PROGRESS NOTES
Assessment:     1  Aftercare following surgery of the musculoskeletal system        Plan:     Problem List Items Addressed This Visit        Other    Aftercare following surgery of the musculoskeletal system - Primary     Patient is doing very well status post right long and ring trigger finger releases  Sutures removed  Advised patient to avoid soaking the hand the next 1-2 weeks until incisions are completely healed  Continue to work on active and passive range of motion  No heavy lifting or gripping  Follow-up in 4 weeks for re-evaluation  All questions were answered to patient's satisfaction  Subjective:     Patient ID: Zachary Real is a 63 y o  female  Chief Complaint: This is a 79-year-old white female who is status post right long and ring trigger finger releases on June 22, 2022  She denies any fevers or chills  She states triggering has resolved  Pain is well controlled        Allergy:  No Known Allergies  Medications:  all current active meds have been reviewed  Past Medical History:  Past Medical History:   Diagnosis Date    Abnormal mammogram     RESOLVED [id number]    Arthritis     Depression     GERD (gastroesophageal reflux disease)     diet managed, occasional    Hallux rigidus     LAST ASSESSED [id number] L grreat toe    Heart murmur From birth    Hyperlipidemia     Lyme disease     LAST ASSESSED [id number]    Murmur, cardiac     functional    PONV (postoperative nausea and vomiting)     Trigger finger     ,r hand ring finger and middle; L same fingers    Trigger middle finger of left hand 4/12/2022    Trigger ring finger of left hand 4/12/2022    Wears glasses      Past Surgical History:  Past Surgical History:   Procedure Laterality Date    COLONOSCOPY      LAST ASSESSED [id number]    DILATION AND CURETTAGE OF UTERUS      ENDOMETRIAL ABLATION      ENDOMETRIAL CRYOABLATION      WITH ULTRASOUND GUIDE     IR EPIDURAL STEROID INJECTION      KNEE ARTHROSCOPY b/l meniscus    [place] FUSION BIG TOE,MT-P JT Left 1/18/2019    Procedure: GREAT TOE FUSION WITH PLATE;  Surgeon: Kelly Saldaña DPM;  Location[facility];  Service: Podiatry    LA INCISE FINGER TENDON SHEATH Left 4/29/2022    Procedure: RELEASE TRIGGER FINGER, LEFT MIDDLE AND RING;  Surgeon: Manny Albrecht MD;  Location: [facility];  Service: Orthopedics    LA INCISE FINGER TENDON SHEATH Right 6/22/2022    Procedure: RELEASE TRIGGER FINGER, RIGHT MIDDLE AND RING FINGER;  Surgeon: Manny Albrecht MD;  Location: [facility];  Service: Orthopedics    TONSILLECTOMY AND ADENOIDECTOMY       Family History:  Family History   Problem Relation Age of Onset    Heart disease Mother     Colon polyps Mother     Stroke Mother     COPD Mother     Hypertension Father     Kidney failure Father     Hyperlipidemia Father     Coronary artery disease Family     Hypertension Family     Lung cancer Sister 61    No Known Problems Daughter     Breast cancer Maternal Grandmother 36    Breast cancer Paternal Grandmother [de-identified]    Breast cancer Maternal Aunt     No Known Problems Maternal Aunt     No Known Problems Maternal Aunt     No Known Problems Maternal Aunt     No Known Problems Paternal Aunt     No Known Problems Sister     No Known Problems Maternal Grandfather     Arthritis Paternal Grandfather     No Known Problems Son     Cancer Sister      Social History:  Social History     Substance and Sexual Activity   Alcohol Use Yes    Alcohol/week: 0 0 standard drinks    Comment: Very seldom     Social History     Substance and Sexual Activity   Drug Use No     Social History     Tobacco Use   Smoking Status Never Smoker   Smokeless Tobacco Never Used     Review of Systems   Constitutional: Negative  HENT: Negative  Eyes: Negative  Respiratory: Negative  Cardiovascular: Negative  Gastrointestinal: Negative  Endocrine: Negative  Genitourinary: Negative  Musculoskeletal: Positive for joint swelling  Negative for arthralgias  Skin: Negative  Allergic/Immunologic: Negative  Neurological: Negative  Hematological: Negative  Psychiatric/Behavioral: Negative  Objective:  BP Readings from Last 1 Encounters:   06/29/22 128/80      Wt Readings from Last 1 Encounters:   06/29/22 88 5 kg (195 lb)      BMI:   Estimated body mass index is 34 54 kg/m² as calculated from the following:    Height as of this encounter: 5' 3" (1 6 m)  Weight as of this encounter: 88 5 kg (195 lb)  BSA:   Estimated body surface area is 1 91 meters squared as calculated from the following:    Height as of this encounter: 5' 3" (1 6 m)  Weight as of this encounter: 88 5 kg (195 lb)  Physical Exam  Constitutional:       General: She is not in acute distress  Appearance: She is well-developed  HENT:      Head: Normocephalic  Eyes:      Conjunctiva/sclera: Conjunctivae normal       Pupils: Pupils are equal, round, and reactive to light  Pulmonary:      Effort: Pulmonary effort is normal  No respiratory distress  Skin:     General: Skin is warm and dry  Neurological:      Mental Status: She is alert and oriented to person, place, and time  Psychiatric:         Behavior: Behavior normal        Right Hand Exam     Range of Motion   Hand   MP Middle: abnormal   MP Ring: abnormal   PIP Middle: abnormal   PIP Ring: abnormal   DIP Middle: abnormal   DIP Ring: abnormal     Other   Erythema: absent  Scars: present (Well-healing incisions with no evidence of infection  No active drainage )  Sensation: normal  Pulse: present    Comments:  Mild swelling of long and ring fingers  No active triggering            No new imaging  Procedure:  Sutures removed from right hand

## 2022-07-25 DIAGNOSIS — E78.5 HYPERLIPIDEMIA, UNSPECIFIED HYPERLIPIDEMIA TYPE: ICD-10-CM

## 2022-07-25 RX ORDER — ATORVASTATIN CALCIUM 40 MG/1
TABLET, FILM COATED ORAL
Qty: 90 TABLET | Refills: 1 | Status: SHIPPED | OUTPATIENT
Start: 2022-07-25

## 2022-07-28 ENCOUNTER — OFFICE VISIT (OUTPATIENT)
Dept: OBGYN CLINIC | Facility: CLINIC | Age: 62
End: 2022-07-28

## 2022-07-28 VITALS
WEIGHT: 195 LBS | SYSTOLIC BLOOD PRESSURE: 122 MMHG | HEIGHT: 63 IN | DIASTOLIC BLOOD PRESSURE: 80 MMHG | BODY MASS INDEX: 34.55 KG/M2

## 2022-07-28 DIAGNOSIS — Z47.89 AFTERCARE FOLLOWING SURGERY OF THE MUSCULOSKELETAL SYSTEM: Primary | ICD-10-CM

## 2022-07-28 PROCEDURE — 99024 POSTOP FOLLOW-UP VISIT: CPT | Performed by: ORTHOPAEDIC SURGERY

## 2022-07-28 NOTE — ASSESSMENT & PLAN NOTE
Patient is doing very well status post right long and ring trigger finger releases  She is doing excellent  She has no restrictions  Advised patient to massage the scars with vitamin E cream to soften up the scar tissue  Follow-up as needed if any problems arise  All questions were answered to patient's satisfaction

## 2022-08-02 ENCOUNTER — OFFICE VISIT (OUTPATIENT)
Dept: RHEUMATOLOGY | Facility: CLINIC | Age: 62
End: 2022-08-02
Payer: COMMERCIAL

## 2022-08-02 VITALS
BODY MASS INDEX: 34.55 KG/M2 | SYSTOLIC BLOOD PRESSURE: 118 MMHG | WEIGHT: 195 LBS | HEIGHT: 63 IN | DIASTOLIC BLOOD PRESSURE: 76 MMHG

## 2022-08-02 DIAGNOSIS — M47.819 SPONDYLOARTHRITIS: Primary | ICD-10-CM

## 2022-08-02 DIAGNOSIS — M19.049 HAND ARTHRITIS: ICD-10-CM

## 2022-08-02 PROCEDURE — 99215 OFFICE O/P EST HI 40 MIN: CPT | Performed by: INTERNAL MEDICINE

## 2022-08-02 RX ORDER — SULFASALAZINE 500 MG/1
1000 TABLET ORAL 2 TIMES DAILY
Qty: 360 TABLET | Refills: 2 | Status: SHIPPED | OUTPATIENT
Start: 2022-08-02 | End: 2022-10-26

## 2022-08-02 RX ORDER — MELOXICAM 15 MG/1
15 TABLET ORAL DAILY
Qty: 30 TABLET | Refills: 1 | Status: SHIPPED | OUTPATIENT
Start: 2022-08-02 | End: 2022-10-26

## 2022-08-02 NOTE — PROGRESS NOTES
Assessment and Plan:   Spondyloarthritis--increase SSA to 1000mg BID  Meloxicam PRN joint pain or arthritis flare  Topical NSAIDs PRN joint pain  Topical analgesics PRN joint pain  Anti-inflammatory diet/exercise  F/u most recent labs from [facility]   Repeat labs in 3 mos  and f/u in 4 mos  or sooner if necessary  Rheumatic Disease Summary  As above    Here for f/u visit  Still with joint pain and morning stiffness  Also with LBP and stiffness  Taking SSA 500mg BID with improvement in her symptoms  S/p trigger finger release b/l 3rd/4th digit flexor tendons  Most recent labs unavailable for review  Xray of SI joints reviewed and no evidence of sacroiliitis  The following portions of the patient's history were reviewed and updated as appropriate: allergies, current medications, past family history, past medical history, past social history, past surgical history and problem list     Review of Systems:   Review of Systems   Constitutional: Negative for fatigue  HENT: Negative for mouth sores  Eyes: Negative for pain  Respiratory: Negative for shortness of breath  Cardiovascular: Negative for leg swelling  Musculoskeletal: Positive for arthralgias and back pain  Negative for joint swelling  Skin: Negative for rash  Neurological: Negative for weakness  Hematological: Negative for adenopathy  Psychiatric/Behavioral: Negative for sleep disturbance         Home Medications:    Current Outpatient Medications:     meloxicam (Mobic) 15 mg tablet, Take 1 tablet (15 mg total) by mouth daily, Disp: 30 tablet, Rfl: 1    sulfaSALAzine (AZULFIDINE) 500 mg tablet, Take 2 tablets (1,000 mg total) by mouth 2 (two) times a day, Disp: 360 tablet, Rfl: 2    atorvastatin (LIPITOR) 40 mg tablet, TAKE 1 TABLET BY MOUTH DAILY AT BEDTIME, Disp: 90 tablet, Rfl: 1    Calcium Carbonate-Vitamin D (CALCIUM 600+D PO), Take 1 tablet by mouth daily, Disp: , Rfl:     multivitamin (THERAGRAN) TABS, Take 1 tablet by mouth daily at bedtime  , Disp: , Rfl:     Probiotic Product (Probiotic Daily) CAPS, Take by mouth (Patient not taking: Reported on 6/14/2022), Disp: , Rfl:     Objective:    Vitals:    08/02/22 1013 08/02/22 1017   BP:  118/76   Weight: 88 5 kg (195 lb)    Height: 5' 3" (1 6 m)        Physical Exam  Musculoskeletal:      Right hand: Swelling and deformity present  Comments: +2nd/3rd Heberden's nodes  Right 3rd/4th flexor healing surgical scars Volar surface of hand         Reviewed labs and imaging  Imaging:   No results found  Labs:   Office Visit on 03/28/2022   Component Date Value Ref Range Status    Sedimentation Rate 03/28/2022 4  0 - 40 mm/hr Final    Rheumatoid Factor (RF) 03/28/2022 <10 0  <14 0 IU/mL Final    C-Reactive Protein, Quant 03/28/2022 <1  0 - 10 mg/L Final    Antinuclear Antibodies, IFA 03/28/2022 Negative   Final    Comment:                                      Negative   <1:80                                       Borderline  1:80                                       Positive   >1:80  ICAP nomenclature: AC-0  For more information about Hep-2 cell patterns use  ANApatterns  org, the official website for the International  Consensus on Antinuclear Antibody (PETE) Patterns (ICAP)       Office Visit on 09/01/2021   Component Date Value Ref Range Status    White Blood Cell Count 10/22/2021 4 3  3 4 - 10 8 x10E3/uL Final    Red Blood Cell Count 10/22/2021 4 79  3 77 - 5 28 x10E6/uL Final    Hemoglobin 10/22/2021 15 0  11 1 - 15 9 g/dL Final    HCT 10/22/2021 43 7  34 0 - 46 6 % Final    MCV 10/22/2021 91  79 - 97 fL Final    MCH 10/22/2021 31 3  26 6 - 33 0 pg Final    MCHC 10/22/2021 34 3  31 5 - 35 7 g/dL Final    RDW 10/22/2021 12 2  11 7 - 15 4 % Final    Platelet Count 33/62/3301 176  150 - 450 x10E3/uL Final    Glucose, Random 10/22/2021 117 (A) 65 - 99 mg/dL Final    BUN 10/22/2021 23  6 - 24 mg/dL Final    Creatinine 10/22/2021 1 02 (A) 0 57 - 1 00 mg/dL Final    eGFR Non  10/22/2021 60  >59 mL/min/1 73 Final    eGFR  10/22/2021 70  >59 mL/min/1 73 Final    Comment: **In accordance with recommendations from the NKF-ASN Task force,**    [facility] is in the process of updating its eGFR calculation to the    2021 CKD-EPI creatinine equation that estimates kidney function    without a race variable   SL AMB BUN/CREATININE RATIO 10/22/2021 23  9 - 23 Final    Sodium 10/22/2021 141  134 - 144 mmol/L Final    Potassium 10/22/2021 4 5  3 5 - 5 2 mmol/L Final    Chloride 10/22/2021 106  96 - 106 mmol/L Final    CO2 10/22/2021 22  20 - 29 mmol/L Final    CALCIUM 10/22/2021 9 9  8 7 - 10 2 mg/dL Final    Protein, Total 10/22/2021 6 8  6 0 - 8 5 g/dL Final    Albumin 10/22/2021 4 6  3 8 - 4 9 g/dL Final    Globulin, Total 10/22/2021 2 2  1 5 - 4 5 g/dL Final    Albumin/Globulin Ratio 10/22/2021 2 1  1 2 - 2 2 Final    TOTAL BILIRUBIN 10/22/2021 0 5  0 0 - 1 2 mg/dL Final    Alk Phos Isoenzymes 10/22/2021 57  44 - 121 IU/L Final                  **Please note reference interval change**    AST 10/22/2021 22  0 - 40 IU/L Final    ALT 10/22/2021 26  0 - 32 IU/L Final    Cholesterol, Total 10/22/2021 182  100 - 199 mg/dL Final    Triglycerides 10/22/2021 97  0 - 149 mg/dL Final    HDL 10/22/2021 53  >39 mg/dL Final    VLDL Cholesterol Calculated 10/22/2021 18  5 - 40 mg/dL Final    LDL Calculated 10/22/2021 111 (A) 0 - 99 mg/dL Final    T  Chol/HDL Ratio 10/22/2021 3 4  0 0 - 4 4 ratio Final    Comment:                                   T  Chol/HDL Ratio                                              Men  Women                                1/2 Avg  Risk  3 4    3 3                                    Avg Risk  5 0    4 4                                 2X Avg  Risk  9 6    7 1                                 3X Avg  Risk 23 4   11 0      Specific Gravity 10/22/2021 1 019  1 005 - 1 030 Final    Ph 10/22/2021 7 0  5 0 - 7 5 Final  Color UA 10/22/2021 Yellow  Yellow Final    Urine Appearance 10/22/2021 Clear  Clear Final    Leukocyte Esterase 10/22/2021 1+ (A) Negative Final    Protein 10/22/2021 Negative  Negative/Trace Final    Glucose, 24 HR Urine 10/22/2021 Negative  Negative Final    Ketone, Urine 10/22/2021 Negative  Negative Final    Blood, Urine 10/22/2021 Negative  Negative Final    Bilirubin, Urine 10/22/2021 Negative  Negative Final    Urobilinogen Urine 10/22/2021 0 2  0 2 - 1 0 mg/dL Final    SL AMB NITRITES URINE, QUAL  10/22/2021 Negative  Negative Final    Microscopic Examination 10/22/2021 See below:   Final    Microscopic was indicated and was performed      SL AMB WBC, URINE 10/22/2021 0-5  0 - 5 /hpf Final    RBC, Urine 10/22/2021 None seen  0 - 2 /hpf Final    Epithelial Cells (non renal) 10/22/2021 0-10  0 - 10 /hpf Final    Casts 10/22/2021 None seen  None seen /lpf Final    Bacteria, Urine 10/22/2021 None seen  None seen/Few Final

## 2022-08-02 NOTE — PATIENT INSTRUCTIONS
Increase the sulfasalazine to 2 tabs twice a day  Take the meloxicam only as needed for joint pain or an arthritis flare  Continue using the Voltaren gel up to 4 times per day as needed

## 2022-08-03 ENCOUNTER — HOSPITAL ENCOUNTER (OUTPATIENT)
Dept: MAMMOGRAPHY | Facility: CLINIC | Age: 62
Discharge: HOME | End: 2022-08-03
Payer: COMMERCIAL

## 2022-08-03 VITALS — WEIGHT: 195 LBS | BODY MASS INDEX: 34.55 KG/M2 | HEIGHT: 63 IN

## 2022-08-03 DIAGNOSIS — Z12.31 ENCOUNTER FOR SCREENING MAMMOGRAM FOR MALIGNANT NEOPLASM OF BREAST: ICD-10-CM

## 2022-08-03 PROCEDURE — 77067 SCR MAMMO BI INCL CAD: CPT

## 2022-08-03 PROCEDURE — 77063 BREAST TOMOSYNTHESIS BI: CPT

## 2022-09-06 ENCOUNTER — OFFICE VISIT (OUTPATIENT)
Dept: FAMILY MEDICINE CLINIC | Facility: CLINIC | Age: 62
End: 2022-09-06
Payer: COMMERCIAL

## 2022-09-06 VITALS
BODY MASS INDEX: 34.2 KG/M2 | HEIGHT: 63 IN | OXYGEN SATURATION: 97 % | HEART RATE: 90 BPM | SYSTOLIC BLOOD PRESSURE: 128 MMHG | WEIGHT: 193 LBS | RESPIRATION RATE: 14 BRPM | TEMPERATURE: 97.9 F | DIASTOLIC BLOOD PRESSURE: 78 MMHG

## 2022-09-06 DIAGNOSIS — Z12.11 SCREENING FOR COLON CANCER: ICD-10-CM

## 2022-09-06 DIAGNOSIS — M20.21 HALLUX RIGIDUS OF RIGHT FOOT: ICD-10-CM

## 2022-09-06 DIAGNOSIS — Z00.00 ENCOUNTER FOR WELL ADULT EXAM WITHOUT ABNORMAL FINDINGS: Primary | ICD-10-CM

## 2022-09-06 PROCEDURE — 3725F SCREEN DEPRESSION PERFORMED: CPT | Performed by: FAMILY MEDICINE

## 2022-09-06 PROCEDURE — 99396 PREV VISIT EST AGE 40-64: CPT | Performed by: FAMILY MEDICINE

## 2022-09-06 NOTE — PROGRESS NOTES
Assessment/Plan:     Diagnoses and all orders for this visit:    Encounter for well adult exam without abnormal findings    Hallux rigidus of right foot  -     Ambulatory Referral to Podiatry; Future    Screening for colon cancer  -     Ambulatory referral for colonoscopy; Future      for Podiatry  Patient her for colonoscopy   She will follow-up with her Rheumatology another specialist  She follow-up with me in 1 year sooner if needed      Subjective:     Chief Complaint   Patient presents with    Physical Exam     Wants referral to podiatry  Had MRI last year and it was on there that there were cysts on her kidneys so she is wondering if she should be following up with anything        Patient ID: Vivian Mcmanus is a 63 y o  female  Patient presents today for yearly physical   She is overall doing well she needs referral for Podiatry as she has a foot issue  She is also due for colonoscopy coming up soon   She sees Rheumatology in things are stable   She has no other acute complaints today      The following portions of the patient's history were reviewed and updated as appropriate: allergies, current medications, past family history, past medical history, past social history, past surgical history and problem list     Review of Systems   Constitutional: Negative  HENT: Negative  Eyes: Negative  Respiratory: Negative  Cardiovascular: Negative  Gastrointestinal: Negative  Endocrine: Negative  Genitourinary: Negative  Musculoskeletal: Negative  Skin: Negative  Allergic/Immunologic: Negative  Neurological: Negative  Hematological: Negative  Psychiatric/Behavioral: Negative  All other systems reviewed and are negative          Objective:    Vitals:    09/06/22 1251   BP: 128/78   BP Location: Left arm   Patient Position: Sitting   Cuff Size: Large   Pulse: 90   Resp: 14   Temp: 97 9 °F (36 6 °C)   SpO2: 97%   Weight: 87 5 kg (193 lb)   Height: 5' 3" (1 6 m) Physical Exam  Vitals and nursing note reviewed  Constitutional:       Appearance: She is well-developed  HENT:      Head: Normocephalic and atraumatic  Right Ear: External ear normal       Left Ear: External ear normal    Eyes:      Conjunctiva/sclera: Conjunctivae normal       Pupils: Pupils are equal, round, and reactive to light  Cardiovascular:      Rate and Rhythm: Normal rate and regular rhythm  Heart sounds: Normal heart sounds  Pulmonary:      Effort: Pulmonary effort is normal       Breath sounds: Normal breath sounds  Abdominal:      General: Bowel sounds are normal       Palpations: Abdomen is soft  Musculoskeletal:         General: Normal range of motion  Cervical back: Normal range of motion  Skin:     General: Skin is warm and dry  Neurological:      Mental Status: She is alert and oriented to person, place, and time  Deep Tendon Reflexes: Reflexes are normal and symmetric  Psychiatric:         Behavior: Behavior normal          Thought Content: Thought content normal          Judgment: Judgment normal          BMI Counseling: Body mass index is 34 19 kg/m²  The BMI is above normal  Nutrition recommendations include reducing portion sizes, decreasing overall calorie intake, 3-5 servings of fruits/vegetables daily, reducing fast food intake, consuming healthier snacks, decreasing soda and/or juice intake, moderation in carbohydrate intake, increasing intake of lean protein, reducing intake of saturated fat and trans fat and reducing intake of cholesterol  Exercise recommendations include exercising 3-5 times per week

## 2022-10-06 ENCOUNTER — TELEPHONE (OUTPATIENT)
Dept: FAMILY MEDICINE CLINIC | Facility: CLINIC | Age: 62
End: 2022-10-06

## 2022-10-06 ENCOUNTER — OFFICE VISIT (OUTPATIENT)
Dept: PODIATRY | Facility: CLINIC | Age: 62
End: 2022-10-06
Payer: COMMERCIAL

## 2022-10-06 VITALS
WEIGHT: 193 LBS | BODY MASS INDEX: 34.2 KG/M2 | HEART RATE: 76 BPM | HEIGHT: 63 IN | DIASTOLIC BLOOD PRESSURE: 79 MMHG | SYSTOLIC BLOOD PRESSURE: 139 MMHG

## 2022-10-06 DIAGNOSIS — Z01.818 PREOP TESTING: Primary | ICD-10-CM

## 2022-10-06 DIAGNOSIS — M20.21 HALLUX RIGIDUS OF RIGHT FOOT: ICD-10-CM

## 2022-10-06 PROCEDURE — 99204 OFFICE O/P NEW MOD 45 MIN: CPT | Performed by: PODIATRIST

## 2022-10-06 RX ORDER — CHLORHEXIDINE GLUCONATE 0.12 MG/ML
15 RINSE ORAL ONCE
OUTPATIENT
Start: 2022-10-06 | End: 2022-10-06

## 2022-10-06 NOTE — PROGRESS NOTES
This patient was seen on 10/6/22  My role is Foot , Ankle, and Wound Specialist    SUBJECTIVE    Chief Complaint:  Stiff painful toe     Patient ID: Dex Quarles is a 63 y o  female  Luisana Baker is here for management of her stiff, painful Right hallux  In the past I treated her for a similar problem on the Left with fusion and she states she's very happy with that toe  The following portions of the patient's history were reviewed and updated as appropriate: allergies, current medications, past family history, past medical history, past social history, past surgical history and problem list     Review of Systems   Constitutional: Negative  Respiratory: Negative  Musculoskeletal: Positive for arthralgias, gait problem and joint swelling  OBJECTIVE      /79   Pulse 76   Ht 5' 3" (1 6 m) Comment: verbal  Wt 87 5 kg (193 lb)   LMP  (LMP Unknown)   BMI 34 19 kg/m²     Foot/Ankle Musculoskeletal Exam    General      Neurological: alert      General additional comments:  I note muscle function of the anterior, posterior, evertor and invertor muscle groups of the lower leg are intact and normal  I note ROM of the ankle joint, subtalar joint, Choparts and Lisfranc's joint complexes are WNL without crepitus nor restrictions bilaterally  I note the Left first metatarsophalangeal joint is clinically fused without pain  I note the Right first metatarsophalangeal joint has restricted, painful motion and some crepitus  Physical Exam  Vitals and nursing note reviewed  Constitutional:       General: She is not in acute distress  Appearance: Normal appearance  She is not ill-appearing, toxic-appearing or diaphoretic  HENT:      Head: Normocephalic and atraumatic  Pulmonary:      Effort: Pulmonary effort is normal       Breath sounds: Normal breath sounds     Musculoskeletal:      Comments:  I note muscle function of the anterior, posterior, evertor and invertor muscle groups of the lower leg are intact and normal  I note ROM of the ankle joint, subtalar joint, Choparts and Lisfranc's joint complexes are WNL without crepitus nor restrictions bilaterally  I note the Left first metatarsophalangeal joint is clinically fused without pain  I note the Right first metatarsophalangeal joint has restricted, painful motion and some crepitus  Skin:     General: Skin is warm  Capillary Refill: Capillary refill takes less than 2 seconds  Neurological:      General: No focal deficit present  Mental Status: She is alert  ASSESSMENT     Diagnoses and all orders for this visit:    Hallux rigidus of right foot  -     Ambulatory Referral to Podiatry  -     X-ray foot right 3+ views; Future         Problem List Items Addressed This Visit        Musculoskeletal and Integument    Hallux rigidus - Primary    Relevant Orders    X-ray foot right 3+ views              PLAN    I ordered xrays Right foot; my wet read reveals first MTPJ joint narrowing, sclerosis and dorsal osteophytes  I was very clear at the beginning of the discussion about alternatives to this surgery including benign neglect, bracing, and second surgical opinions  I spent time to discuss with the patient the surgical procedure(s) as fusion Right first metatarsophalangeal joint with plate and screws, pre-op testing, and post-op course (strict NWB for 4 weeks followed by an additional period of protected WB in [place] for 4-6 weeks)required to properly heal the surgery  I discussed risks as infection, scar, swelling, chronic pain, painful or prominent hardware, possible need to remove hardware, poor healing of incision or bone that could require more surgery, incomplete correction of deformities or recurrence of deformities, change in shape of foot, toe, or walking and function, numbness, neuritis/RSD, blood clots in the leg or lung, and even death from anesthesia complications   No guarantees were given and the possibility of recurrent deformity or incomplete correction were discussed before patient signed the consent form  We also discussed the need for anticoagulation  The surgery, history and physical and PATS will be scheduled

## 2022-10-06 NOTE — TELEPHONE ENCOUNTER
Patient needs a pre op clearance for her right big toe fushion is it okay that I put her on your schedule for 10/26/2022 for 15mins because she has to have it done before 11/18/2022

## 2022-10-20 ENCOUNTER — OFFICE VISIT (OUTPATIENT)
Dept: LAB | Facility: CLINIC | Age: 62
End: 2022-10-20
Payer: COMMERCIAL

## 2022-10-20 DIAGNOSIS — M20.21 HALLUX RIGIDUS OF RIGHT FOOT: ICD-10-CM

## 2022-10-20 DIAGNOSIS — Z01.818 PREOP TESTING: ICD-10-CM

## 2022-10-20 LAB
ATRIAL RATE: 76 BPM
ATRIAL RATE: 78 BPM
P AXIS: 32 DEGREES
P AXIS: 33 DEGREES
PR INTERVAL: 140 MS
PR INTERVAL: 146 MS
QRS AXIS: 32 DEGREES
QRS AXIS: 33 DEGREES
QRSD INTERVAL: 84 MS
QRSD INTERVAL: 90 MS
QT INTERVAL: 392 MS
QT INTERVAL: 400 MS
QTC INTERVAL: 446 MS
QTC INTERVAL: 450 MS
T WAVE AXIS: 33 DEGREES
T WAVE AXIS: 38 DEGREES
VENTRICULAR RATE: 76 BPM
VENTRICULAR RATE: 78 BPM

## 2022-10-20 PROCEDURE — 93005 ELECTROCARDIOGRAM TRACING: CPT

## 2022-10-20 PROCEDURE — 93010 ELECTROCARDIOGRAM REPORT: CPT | Performed by: INTERNAL MEDICINE

## 2022-10-26 ENCOUNTER — OFFICE VISIT (OUTPATIENT)
Dept: FAMILY MEDICINE CLINIC | Facility: CLINIC | Age: 62
End: 2022-10-26
Payer: COMMERCIAL

## 2022-10-26 VITALS
OXYGEN SATURATION: 97 % | HEART RATE: 91 BPM | BODY MASS INDEX: 34.66 KG/M2 | SYSTOLIC BLOOD PRESSURE: 130 MMHG | HEIGHT: 63 IN | DIASTOLIC BLOOD PRESSURE: 78 MMHG | RESPIRATION RATE: 14 BRPM | WEIGHT: 195.6 LBS | TEMPERATURE: 98.1 F

## 2022-10-26 DIAGNOSIS — M20.21 HALLUX RIGIDUS OF RIGHT FOOT: Primary | ICD-10-CM

## 2022-10-26 DIAGNOSIS — Z23 IMMUNIZATION DUE: ICD-10-CM

## 2022-10-26 PROCEDURE — 99213 OFFICE O/P EST LOW 20 MIN: CPT | Performed by: FAMILY MEDICINE

## 2022-10-26 PROCEDURE — 90471 IMMUNIZATION ADMIN: CPT

## 2022-10-26 PROCEDURE — 90682 RIV4 VACC RECOMBINANT DNA IM: CPT

## 2022-10-26 NOTE — PROGRESS NOTES
Assessment/Plan:     Diagnoses and all orders for this visit:    Hallux rigidus of right foot    Immunization due  -     influenza vaccine, quadrivalent, recombinant, PF, 0 5 mL, for patients 18 yr+ (FLUBLOK)       patient is medically cleared for her surgery    Flu shot given today   Her preop testing was reviewed and is normal   She can follow up as scheduled      Subjective:     Chief Complaint   Patient presents with   • Pre-op Exam     Right big toe fushion on 11/18/22 with Dr Yoav Lewis at [address]        Patient ID: Angelina Rivero is a 63 y o  female  Patient presents today for preop clearance   Patient is having foot surgery   She has no acute complaints today we did review her lab work which was stable   Her EKG also was unchanged from prior      The following portions of the patient's history were reviewed and updated as appropriate: allergies, current medications, past family history, past medical history, past social history, past surgical history and problem list     Review of Systems   Constitutional: Negative  HENT: Negative  Eyes: Negative  Respiratory: Negative  Cardiovascular: Negative  Gastrointestinal: Negative  Endocrine: Negative  Genitourinary: Negative  Musculoskeletal: Negative  Skin: Negative  Allergic/Immunologic: Negative  Neurological: Negative  Hematological: Negative  Psychiatric/Behavioral: Negative  All other systems reviewed and are negative  Objective:    Vitals:    10/26/22 1316   BP: 130/78   BP Location: Left arm   Patient Position: Sitting   Cuff Size: Large   Pulse: 91   Resp: 14   Temp: 98 1 °F (36 7 °C)   SpO2: 97%   Weight: 88 7 kg (195 lb 9 6 oz)   Height: 5' 3" (1 6 m)          Physical Exam  Vitals and nursing note reviewed  Constitutional:       Appearance: She is well-developed  HENT:      Head: Normocephalic and atraumatic        Right Ear: External ear normal       Left Ear: External ear normal    Eyes: Conjunctiva/sclera: Conjunctivae normal       Pupils: Pupils are equal, round, and reactive to light  Cardiovascular:      Rate and Rhythm: Normal rate and regular rhythm  Heart sounds: Normal heart sounds  Pulmonary:      Effort: Pulmonary effort is normal       Breath sounds: Normal breath sounds  Abdominal:      General: Bowel sounds are normal       Palpations: Abdomen is soft  Musculoskeletal:         General: Normal range of motion  Cervical back: Normal range of motion  Skin:     General: Skin is warm and dry  Neurological:      Mental Status: She is alert and oriented to person, place, and time  Deep Tendon Reflexes: Reflexes are normal and symmetric  Psychiatric:         Behavior: Behavior normal          Thought Content:  Thought content normal          Judgment: Judgment normal

## 2022-11-11 ENCOUNTER — ANESTHESIA EVENT (OUTPATIENT)
Dept: PERIOP | Facility: HOSPITAL | Age: 62
End: 2022-11-11

## 2022-11-14 ENCOUNTER — TELEPHONE (OUTPATIENT)
Dept: OBGYN CLINIC | Facility: CLINIC | Age: 62
End: 2022-11-14

## 2022-11-14 NOTE — TELEPHONE ENCOUNTER
Patient ws called and told to hold her sulfasalazine prior to her surgery, and resume one week afterwards

## 2022-11-14 NOTE — TELEPHONE ENCOUNTER
Caller: Patient     Doctor: Souleymane Damon    Reason for call: Patient having surgery with [facility] this coming Friday 11/18/2022 , they are wanting to know if it is ok for the patient to take her sulfasalazine prior to her surgery or if she needs to hold it  If needs to be held when should she?      She is having a fusion on right foot     Call back#: [phone] or [phone]

## 2022-11-14 NOTE — PRE-PROCEDURE INSTRUCTIONS
Pre-Surgery Instructions:   Medication Instructions   • atorvastatin (LIPITOR) 40 mg tablet Take night before surgery   • Calcium Carbonate-Vitamin D (CALCIUM 600+D PO) Stop taking   • meloxicam (Mobic) 15 mg tablet Stop taking 3 days prior to surgery  • multivitamin (THERAGRAN) TABS Stop taking   • sulfaSALAzine (AZULFIDINE) 500 mg tablet Instructions provided by MD   Have you had / have a sore throat? No  Have you had / have a cough less than 1 week? No  Have you had / have a fever greater than 100 0 - 100  4? No  Are you experiencing any shortness of breath? No    Review with patient via phone medications and showering instructions  Instructed to avoid all ASA and OTC Vit/Supp  prior to surgery and to avoid NSAIDs 3 days prior to surgery per anesthesia instructions  Tylenol ok to take prn  [facility] call with surgery schedule time, nothing eat or drink after midnight  Verbalized understanding

## 2022-11-18 ENCOUNTER — APPOINTMENT (OUTPATIENT)
Dept: RADIOLOGY | Facility: HOSPITAL | Age: 62
End: 2022-11-18

## 2022-11-18 ENCOUNTER — HOSPITAL ENCOUNTER (OUTPATIENT)
Facility: HOSPITAL | Age: 62
Setting detail: OUTPATIENT SURGERY
Discharge: HOME | End: 2022-11-18
Attending: PODIATRIST | Admitting: PODIATRIST

## 2022-11-18 ENCOUNTER — ANESTHESIA (OUTPATIENT)
Dept: PERIOP | Facility: HOSPITAL | Age: 62
End: 2022-11-18

## 2022-11-18 VITALS
OXYGEN SATURATION: 98 % | WEIGHT: 190 LBS | DIASTOLIC BLOOD PRESSURE: 65 MMHG | RESPIRATION RATE: 22 BRPM | HEART RATE: 68 BPM | TEMPERATURE: 98 F | HEIGHT: 63 IN | BODY MASS INDEX: 33.66 KG/M2 | SYSTOLIC BLOOD PRESSURE: 139 MMHG

## 2022-11-18 DIAGNOSIS — G89.18 ACUTE POST-OPERATIVE PAIN: Primary | ICD-10-CM

## 2022-11-18 DEVICE — CP LAG SCREW (T8)
Type: IMPLANTABLE DEVICE | Site: FOOT | Status: FUNCTIONAL
Brand: ANCHORAGE

## 2022-11-18 DEVICE — LOCKING SCREW, FULLY THREADED,T8
Type: IMPLANTABLE DEVICE | Site: FOOT | Status: FUNCTIONAL
Brand: VARIAX

## 2022-11-18 DEVICE — POLYAXIAL LOCKING PLATE -  MTP CROSS-PLATE, RIGHT (T8)
Type: IMPLANTABLE DEVICE | Site: FOOT | Status: FUNCTIONAL
Brand: ANCHORAGE

## 2022-11-18 RX ORDER — SODIUM CHLORIDE, SODIUM LACTATE, POTASSIUM CHLORIDE, CALCIUM CHLORIDE 600; 310; 30; 20 MG/100ML; MG/100ML; MG/100ML; MG/100ML
INJECTION, SOLUTION INTRAVENOUS CONTINUOUS PRN
Status: DISCONTINUED | OUTPATIENT
Start: 2022-11-18 | End: 2022-11-18

## 2022-11-18 RX ORDER — METOCLOPRAMIDE HYDROCHLORIDE 5 MG/ML
10 INJECTION INTRAMUSCULAR; INTRAVENOUS ONCE AS NEEDED
Status: DISCONTINUED | OUTPATIENT
Start: 2022-11-18 | End: 2022-11-18 | Stop reason: HOSPADM

## 2022-11-18 RX ORDER — OXYCODONE HYDROCHLORIDE 5 MG/1
5 TABLET ORAL EVERY 4 HOURS PRN
Status: DISCONTINUED | OUTPATIENT
Start: 2022-11-18 | End: 2022-11-18 | Stop reason: HOSPADM

## 2022-11-18 RX ORDER — PROPOFOL 10 MG/ML
INJECTION, EMULSION INTRAVENOUS AS NEEDED
Status: DISCONTINUED | OUTPATIENT
Start: 2022-11-18 | End: 2022-11-18

## 2022-11-18 RX ORDER — ONDANSETRON 2 MG/ML
INJECTION INTRAMUSCULAR; INTRAVENOUS AS NEEDED
Status: DISCONTINUED | OUTPATIENT
Start: 2022-11-18 | End: 2022-11-18

## 2022-11-18 RX ORDER — PROPOFOL 10 MG/ML
INJECTION, EMULSION INTRAVENOUS CONTINUOUS PRN
Status: DISCONTINUED | OUTPATIENT
Start: 2022-11-18 | End: 2022-11-18

## 2022-11-18 RX ORDER — CEFAZOLIN SODIUM 2 G/50ML
2000 SOLUTION INTRAVENOUS ONCE
Status: COMPLETED | OUTPATIENT
Start: 2022-11-18 | End: 2022-11-18

## 2022-11-18 RX ORDER — FENTANYL CITRATE 50 UG/ML
INJECTION, SOLUTION INTRAMUSCULAR; INTRAVENOUS AS NEEDED
Status: DISCONTINUED | OUTPATIENT
Start: 2022-11-18 | End: 2022-11-18

## 2022-11-18 RX ORDER — OXYCODONE HYDROCHLORIDE AND ACETAMINOPHEN 5; 325 MG/1; MG/1
1 TABLET ORAL EVERY 6 HOURS PRN
Qty: 24 TABLET | Refills: 0 | Status: SHIPPED | OUTPATIENT
Start: 2022-11-18 | End: 2022-11-28

## 2022-11-18 RX ORDER — FENTANYL CITRATE/PF 50 MCG/ML
25 SYRINGE (ML) INJECTION
Status: DISCONTINUED | OUTPATIENT
Start: 2022-11-18 | End: 2022-11-18 | Stop reason: HOSPADM

## 2022-11-18 RX ORDER — CHLORHEXIDINE GLUCONATE 0.12 MG/ML
15 RINSE ORAL ONCE
Status: DISCONTINUED | OUTPATIENT
Start: 2022-11-18 | End: 2022-11-18 | Stop reason: HOSPADM

## 2022-11-18 RX ORDER — ACETAMINOPHEN 325 MG/1
650 TABLET ORAL EVERY 4 HOURS PRN
Status: DISCONTINUED | OUTPATIENT
Start: 2022-11-18 | End: 2022-11-18 | Stop reason: HOSPADM

## 2022-11-18 RX ORDER — ONDANSETRON 2 MG/ML
4 INJECTION INTRAMUSCULAR; INTRAVENOUS EVERY 6 HOURS PRN
Status: DISCONTINUED | OUTPATIENT
Start: 2022-11-18 | End: 2022-11-18 | Stop reason: HOSPADM

## 2022-11-18 RX ORDER — BUPIVACAINE HYDROCHLORIDE 2.5 MG/ML
INJECTION, SOLUTION EPIDURAL; INFILTRATION; INTRACAUDAL AS NEEDED
Status: DISCONTINUED | OUTPATIENT
Start: 2022-11-18 | End: 2022-11-18 | Stop reason: HOSPADM

## 2022-11-18 RX ORDER — DEXAMETHASONE SODIUM PHOSPHATE 10 MG/ML
INJECTION, SOLUTION INTRAMUSCULAR; INTRAVENOUS AS NEEDED
Status: DISCONTINUED | OUTPATIENT
Start: 2022-11-18 | End: 2022-11-18

## 2022-11-18 RX ORDER — MIDAZOLAM HYDROCHLORIDE 2 MG/2ML
INJECTION, SOLUTION INTRAMUSCULAR; INTRAVENOUS AS NEEDED
Status: DISCONTINUED | OUTPATIENT
Start: 2022-11-18 | End: 2022-11-18

## 2022-11-18 RX ORDER — PROMETHAZINE HYDROCHLORIDE 25 MG/ML
25 INJECTION, SOLUTION INTRAMUSCULAR; INTRAVENOUS ONCE AS NEEDED
Status: DISCONTINUED | OUTPATIENT
Start: 2022-11-18 | End: 2022-11-18 | Stop reason: HOSPADM

## 2022-11-18 RX ORDER — HYDROMORPHONE HCL/PF 1 MG/ML
0.5 SYRINGE (ML) INJECTION
Status: DISCONTINUED | OUTPATIENT
Start: 2022-11-18 | End: 2022-11-18 | Stop reason: HOSPADM

## 2022-11-18 RX ADMIN — FENTANYL CITRATE 25 MCG: 50 INJECTION, SOLUTION INTRAMUSCULAR; INTRAVENOUS at 12:53

## 2022-11-18 RX ADMIN — Medication 20 MG: at 13:45

## 2022-11-18 RX ADMIN — FENTANYL CITRATE 75 MCG: 50 INJECTION, SOLUTION INTRAMUSCULAR; INTRAVENOUS at 14:33

## 2022-11-18 RX ADMIN — PROPOFOL 60 MG: 10 INJECTION, EMULSION INTRAVENOUS at 12:51

## 2022-11-18 RX ADMIN — PROPOFOL 100 MCG/KG/MIN: 10 INJECTION, EMULSION INTRAVENOUS at 12:51

## 2022-11-18 RX ADMIN — SODIUM CHLORIDE, SODIUM LACTATE, POTASSIUM CHLORIDE, AND CALCIUM CHLORIDE: .6; .31; .03; .02 INJECTION, SOLUTION INTRAVENOUS at 12:48

## 2022-11-18 RX ADMIN — Medication 30 MG: at 12:58

## 2022-11-18 RX ADMIN — DEXAMETHASONE SODIUM PHOSPHATE 10 MG: 10 INJECTION, SOLUTION INTRAMUSCULAR; INTRAVENOUS at 13:03

## 2022-11-18 RX ADMIN — ONDANSETRON 4 MG: 2 INJECTION INTRAMUSCULAR; INTRAVENOUS at 13:03

## 2022-11-18 RX ADMIN — MIDAZOLAM 2 MG: 1 INJECTION INTRAMUSCULAR; INTRAVENOUS at 12:48

## 2022-11-18 RX ADMIN — CEFAZOLIN SODIUM 2000 MG: 2 SOLUTION INTRAVENOUS at 13:07

## 2022-11-18 NOTE — DISCHARGE INSTRUCTIONS
Dr Cristina Goncalves, DPM  Post-op surgery Instructions    Pain / Swelling  There is expected to be some discomfort, swelling and bruising of the foot  You might see some blood on the bandage  This is not a cause for alarm  However, if there is active or persistent bleeding (blood running out of the bandage while at rest) - call the office at once (or) go to a [facility] ER and ask them to page the podiatry residents  Apply an ice bag to the top of your ankle for 30 minutes for each waking hour, for the first 72 hours  This should be discontinued when sleeping  This will also work through your cast if you have one  Ice must not leak and wet the dressings  Also, using the ice inappropriately can cause permanent nerve damage  Your foot should be elevated as much as possible for the first 72 hours  The foot should be above heart level  If your foot is below heart level, throbbing and pain will increase  When sleeping, elevation can be accomplished by putting a small hard suitcase between the box spring and mattress at the foot of the bed  Walking and standing will increase pain, throbbing and bleeding  Persistent pain despite elevation and your pain meds can many times be relieved by removing the tight brown compression layer (called the ACE wrap) that is over the white gauze dressing  If you are elevating and taking your pain meds and pain is still severe, remove this brown stretchy layer but leave the gauze intact  Wait 30 minutes  If the pain subsides, reapply the ACE so it’s not so tight  If pain doesn’t get better, call your doctor  Dressings / Casts  Do not remove your surgical dressings - they will be changed at your doctor appointment  Do not allow surgical dressings to get wet  Sponge baths should be used until the sutures are removed  Do not try to keep the foot dry using a garbage bag and tape - this rarely works  If you get your dressings or cast wet - call your doctor immediately    If your cast or dressings feel tight - elevate your foot for 30 minutes  If this doesn’t help and you feel tingling or see toe discoloration - call your doctor or go to a [facility] ER and ask them to page the podiatry residents  Do not put things in your cast such as powder, coat hangers to scratch, etc  This can cause skin damage and infections  Infection  If you have a fever at or above 100 degrees, chills, sweats, or see red streaks rising above the dressing or smell odor / see pus (creamy white drainage), call your doctor immediately or go to a [facility] ER and ask them to page the podiatry residents  Constipation  If you have severe constipation after surgery, this can be due to the pain medication  Notify your doctor and special medication will be prescribed to deal with this  Blood Clots  If you had surgery and are in a cast, have an external fixation device, or are non-weightbearing using crutches, a knee scooter, a wheelchair, a walker, or an iWalk device - you need to be on a blood thinner  Your doctor will prescribe one of the regimens below  If you run out of the blood thinner checked off below before you are walking normally on your foot and out of your cast - notify your doctor immediately so you can get a refill  Not doing so can lead to blood clots and serious complications including death  Aspirin 325mg daily    Numbness  It is normal for your foot to be numb until about dinner time  If you’ve had a popliteal block procedure, you might be numb until the following day  When you start to feel pins and needles in the foot - this means the block is wearing off  That is the appropriate time to take your pain medication  Pain Medication  Do not supplement your pain medication with over the counter drugs, old leftover pain medications, or extra Tylenol  You must discuss any additional medications with your doctor prior to taking them for pain     Driving  No driving is allowed without discussion with the doctor  Ambulation  Nonweightbearing to surgical foot  If given a flat, stiff shoe / darco wedge shoe, Do not walk at all without it  Use a device (cane, walker, crutches) to take some weight off of the foot when walking  If instructed not to put weight on the surgical foot, use the following:  Knee scooter     Putting weight on the foot will lead to complications

## 2022-11-18 NOTE — ANESTHESIA PREPROCEDURE EVALUATION
Procedure:  ARTHRODESIS / FUSION FOOT (Right: Foot)    Relevant Problems   CARDIO   (+) Hyperlipidemia      GI/HEPATIC   (+) GERD without esophagitis      NEURO/PSYCH   (+) Other depressive disorder        Physical Exam    Airway    Mallampati score: II  TM Distance: >3 FB  Neck ROM: full     Dental       Cardiovascular      Pulmonary      Other Findings        Anesthesia Plan  ASA Score- 2     Anesthesia Type- IV sedation with anesthesia with ASA Monitors  Additional Monitors:   Airway Plan:           Plan Factors-Exercise tolerance (METS): >4 METS  Chart reviewed  Patient is not a current smoker  Induction- intravenous  Postoperative Plan-     Informed Consent- Anesthetic plan and risks discussed with patient and spouse  I personally reviewed this patient with the CRNA  Discussed and agreed on the Anesthesia Plan with the CRNA  Liane Harkins

## 2022-11-18 NOTE — OP NOTE
OPERATIVE REPORT - Podiatry  PATIENT NAME: Dex Quarles    :  1960  MRN: [id number]  Pt Location:  OR ROOM 01    SURGERY DATE: 2022    Surgeon(s) and Role:     * Taylor Vásquez DPM - Primary     * Jaron Barrera DPM - Assisting    Pre-op Diagnosis:  Hallux rigidus of right foot [M20 21]    Post-Op Diagnosis Codes:     * Hallux rigidus of right foot [M20 21]    Procedure(s) (LRB):  ARTHRODESIS / FUSION FOOT (Right)    Specimen(s):  * No specimens in log *    Estimated Blood Loss:   Minimal    Drains:  * No LDAs found *    Anesthesia Type:   IV Sedation with Anesthesia with 20 ml of 1% Lidocaine and 0 5% Bupivacaine in a 1:1 mixture    Hemostasis:  -18 in pneumatic ankle tourniquet inflated to 250 mmHg for 81 minutes  -manual compression  -atraumatic technique  -electrocautery    Materials:  Implant Name Type Inv  Item Serial No   Lot No  LRB No  Used Action   PLATE LCK POLYAXIAL MTP RT - [id number]  PLATE LCK POLYAXIAL MTP RT  [employer]  Right 1 Implanted   SCREW LCK 2 7 X 16MM T8 FLLY THRD - [id number]  SCREW LCK 2 7 X 16MM T8 FLLY THRD  [employer]  Right 2 Implanted   SCREW LAG 3 6 X 24MM - [id number]  SCREW LAG 3 6 X 24MM  [employer]  Right 1 Implanted   SCREW LCK 2 7 X 14MM T8 FLLY THRD - [id number]  SCREW LCK 2 7 X 14MM T8 FLLY THRD  [employer]  Right 2 Implanted     -2-0 fiber loop  -3-0 Vicryl  -4-0 Vicryl  -4-0 nylon      Operative Findings:  Consistent with diagnosis    Complications:   None    Procedure and Technique:     Under mild sedation, the patient was brought into the operating room and placed on the operating room table in the supine position  IV sedation was achieved by anesthesia team and a universal timeout was performed where all parties are in agreement of correct patient, correct procedure and correct site  A pneumatic tourniquet was then placed over the patient's right lower extremity with ample padding   A Crenshaw block was performed consisting of 20 ml of 1% Lidocaine and 0 5% Bupivacaine in a 1:1 mixture  The foot was then prepped and draped in the usual aseptic manner  An esmarch bandage was used to exsangunate the foot and the pneumatic tourniquet was then inflated to 250mmHg  Attention was then directed to dorsomedial aspect of the Right foot where an approximately 6cm dorsolinear incision was made  This incision was deepened to subcutaneous tissue with a sterile 15 blade  All vital neurovascular structures were identified and then retracted, all bleeders were identified and cauterized  Attention was directed to the 1st interspace via the original skin incision  The lateral structures attaching to the 1st metatarsal head and proximal phalanx base were freed with a scissor and a lateral capsulotomy was performed freeing up the lateral fibular assess void ligament from the metatarsal head  Noted increase in joint movement as well as more medial reduction of the hallux at this time  The EHL was then identified and retracted laterally  A capsular incision was then made and all capsular and periosteal structures were reflected off of the 1st metatarsal head and the base of the proximal phalanx, all soft tissue attachments were reflected off the 1st metatarsal head and base of the proximal phalanx  Upon visualization of the MTPJ, it was noted that there was cartilage destruction to the 1st metatarsal head and proximal phalanx base and prominent osteophyte formation circumferentially around the joint  A K-wire was then inserted into the central aspect of the 1st metatarsal head  The provided conical reamer was then utilized to remove the articular cartilage and subchondral bone from the 1st metatarsal head  A K-wire was then removed and was placed in the central aspect of the proximal phalanx  The provided conical Reamer was then utilized to remove the articular cartilage and subchondral bone from the proximal phalanx base  The K-wire was then removed    2 0 mm drill bit was then utilized for subchondral drilling to both the 1st metatarsal head and the base of the proximal phalanx  A K-wire for temporary fixation was then placed between the medial aspect of the proximal phalanx base and the 1st metatarsal head in order to hold reduction  Adequate reduction and position of the joint was visualized and assured using C-arm  The plating template was then placed over top the 1st MTPJ and secured in place of the K-wire  Proper position was confirmed under fluoroscopy was noted to be excellent  The K-wire remained intact while the plate was removed  Per manufacture's guidelines, the dorsal aspect of the 1st metatarsal at the location of the lag screw was then countersunk  The [place] plate was then placed over the 1st MTPJ and secured in place with 2 bb tacks  Proximal screw holes were then drilled, measured, and appropriately sized screws were placed as listed above in implants  The compression screw was then drilled, measured, and appropriately sized screw was placed in order for compression  The distal holes were then drilled, measured, and screws were placed as listed above and implants  The temporary K-wire was then removed and proper plate position was confirmed under fluoroscopy and was noted to be excellent  Prior to closure, the extensor hallucis longus tendon was noted to be transected  The proximal and distal ends were identified, and the tendon was repaired utilizing 2-0 fiber loop  Tendon integrity was checked and was noted to be excellent  The incision site was then irrigated with a bulb syringe of normal sterile saline  Periosteal/capsular closure was achieved with 3-0 Vicryl  Subcutaneous closure was achieved with 4-0 Vicryl  Skin was reapproximated with 4-0 nylon in horizontal mattress technique    The foot was then cleansed and dried incision site was dressed with Adaptic, 4 x 4 gauze, ABD, Monique, and secured with 4 in Ace bandage  The tourniquet was deflated at approximately 81 min and normal hyperemic response was noted to all digits  The patient tolerated the procedure and anesthesia well without immediate complications and transferred to PACU with vital signs stable  Dr Jeff Cuello was present during the entire procedure and participated in all grajeda aspects  Mckay Shirley DPM  DATE: November 18, 2022  TIME: 2:48 PM      Portions of the record may have been created with voice recognition software  Occasional wrong word or "sound a like" substitutions may have occurred due to the inherent limitations of voice recognition software  Read the chart carefully and recognize, using context, where substitutions have occurred

## 2022-11-18 NOTE — ANESTHESIA POSTPROCEDURE EVALUATION
Post-Op Assessment Note    CV Status:  Stable  Pain Score: 0    Pain management: adequate     Mental Status:  Alert and awake   Hydration Status:  Euvolemic   PONV Controlled:  Controlled   Airway Patency:  Patent      Post Op Vitals Reviewed: Yes      Staff: Anesthesiologist, CRNA         No notable events documented      BP   127/86   Temp   97 6   Pulse  67   Resp   18   SpO2   96

## 2022-11-18 NOTE — DISCHARGE SUMMARY
Discharge Summary Outpatient Procedure Podiatry -   Liseth Leal 63 y o  female MRN: [id number]  Unit/Bed#: OR POOL Encounter: [id number]    Admission Date: 11/18/2022     Admitting Diagnosis: Hallux rigidus of right foot [M20 21]    Discharge Diagnosis: same    Procedures Performed: ARTHRODESIS / FUSION FOOT: 16817 (CPT®)    Complications: none    Condition at Discharge: stable    Discharge instructions/Information to patient and family:   See after visit summary for information provided to patient and family  Provisions for Follow-Up Care/Important appointments:  See after visit summary for information related to follow-up care and any pertinent home health orders  Discharge Medications:  See after visit summary for reconciled discharge medications provided to patient and family

## 2022-11-23 ENCOUNTER — OFFICE VISIT (OUTPATIENT)
Dept: PODIATRY | Facility: CLINIC | Age: 62
End: 2022-11-23

## 2022-11-23 VITALS
HEART RATE: 72 BPM | DIASTOLIC BLOOD PRESSURE: 70 MMHG | HEIGHT: 63 IN | SYSTOLIC BLOOD PRESSURE: 133 MMHG | BODY MASS INDEX: 33.66 KG/M2

## 2022-11-23 DIAGNOSIS — Z09 POSTOP CHECK: Primary | ICD-10-CM

## 2022-11-23 NOTE — PROGRESS NOTES
This patient was seen on 11/23/22  Chief Complaint:  Lanre Livingston is here for a post-op visit  Subjective:      Patient ID: Lanre Livingston is a 63 y o  female  Christianne Begin is here for a post-op check  She is NWB and taking her aspirin 325mg daily  She denies post-op pain  The following portions of the patient's history were reviewed and updated as appropriate: allergies, current medications, past family history, past medical history, past social history, past surgical history and problem list     Review of Systems   Constitutional: Negative  Respiratory: Negative  Objective:    Lanre Livingston appears stable, non-toxic and alert  /70   Pulse 72   Ht 5' 3" (1 6 m)   LMP  (LMP Unknown)   BMI 33 66 kg/m²     Foot/Ankle Musculoskeletal Exam    General    Neurological: alert       Physical Exam  Vitals and nursing note reviewed  Constitutional:       General: She is not in acute distress  Appearance: Normal appearance  She is not ill-appearing, toxic-appearing or diaphoretic  Pulmonary:      Effort: Pulmonary effort is normal       Breath sounds: Normal breath sounds  Neurological:      Mental Status: She is alert  The incision is well-coapted without dehiscence, signs of infection, active drainage, necrosis  Calf is soft and non-tender  Neurovascular status is intact to the foot  Normal post-op edema and bruising is noted  Diagnoses and all orders for this visit:    Postop check         Problem List Items Addressed This Visit        Other    Postop check - Primary       Assessment:  Healing post-op site  Plan:  Sutures left intact  Patient instructed to remain NWB, and to take her aspirin daily and keep the dressings clean, dry and intact

## 2022-12-06 ENCOUNTER — OFFICE VISIT (OUTPATIENT)
Dept: RHEUMATOLOGY | Facility: CLINIC | Age: 62
End: 2022-12-06

## 2022-12-06 VITALS — HEIGHT: 63 IN | BODY MASS INDEX: 33.66 KG/M2 | SYSTOLIC BLOOD PRESSURE: 150 MMHG | DIASTOLIC BLOOD PRESSURE: 90 MMHG

## 2022-12-06 DIAGNOSIS — M19.049 HAND ARTHRITIS: Primary | ICD-10-CM

## 2022-12-06 NOTE — PROGRESS NOTES
Assessment and Plan:   Spondyloarthritis with peripheral arthritis--continue SSA 1000mg BID  Repeat CBC, CMP in 8 weeks  Continue to f/u with podiatrist  Topical NSAIDs/analgesics PRN joint pain  Anti-inflammatory diet/exercise  F/u in 6 mos  or sooner if necessary      Rheumatic Disease Summary  As above    Here for f/u visit  Overall doing well  S/p joint fusion right foot 11/18/22  Scheduled for podiatric f/u this week  Intermittent DIP pain with weather changes  No side effects on SSA  No other complaints  All [facility] from [facility] reviewed  Mild increased ALT  The following portions of the patient's history were reviewed and updated as appropriate: allergies, current medications, past family history, past medical history, past social history, past surgical history and problem list     Review of Systems:   Review of Systems   Constitutional: Negative for fatigue  HENT: Negative for mouth sores  Eyes: Negative for pain  Respiratory: Negative for shortness of breath  Cardiovascular: Negative for leg swelling  Musculoskeletal: Positive for arthralgias  Negative for joint swelling  Skin: Negative for rash  Neurological: Negative for weakness  Hematological: Negative for adenopathy  Psychiatric/Behavioral: Negative for sleep disturbance         Home Medications:    Current Outpatient Medications:   •  atorvastatin (LIPITOR) 40 mg tablet, TAKE 1 TABLET BY MOUTH DAILY AT BEDTIME, Disp: 90 tablet, Rfl: 1  •  Calcium Carbonate-Vitamin D (CALCIUM 600+D PO), Take 1 tablet by mouth daily, Disp: , Rfl:   •  meloxicam (Mobic) 15 mg tablet, Take 1 tablet (15 mg total) by mouth daily (Patient taking differently: Take 15 mg by mouth daily as needed), Disp: 30 tablet, Rfl: 1  •  multivitamin (THERAGRAN) TABS, Take 1 tablet by mouth daily at bedtime  , Disp: , Rfl:   •  sulfaSALAzine (AZULFIDINE) 500 mg tablet, Take 2 tablets (1,000 mg total) by mouth 2 (two) times a day, Disp: 360 tablet, Rfl: 2    Objective:    Vitals:    12/06/22 1038   BP: 150/90   Height: 5' 3" (1 6 m)       Physical Exam  Constitutional:       General: She is not in acute distress  HENT:      Head: Normocephalic and atraumatic  Eyes:      Conjunctiva/sclera: Conjunctivae normal    Cardiovascular:      Rate and Rhythm: Normal rate and regular rhythm  Heart sounds: S1 normal and S2 normal      No friction rub  Pulmonary:      Effort: Pulmonary effort is normal  No respiratory distress  Breath sounds: Normal breath sounds  No wheezing, rhonchi or rales  Musculoskeletal:      Right hand: Deformity and tenderness present  Left hand: Deformity and tenderness present  Cervical back: Neck supple  Skin:     Coloration: Skin is not pale  Neurological:      Mental Status: She is alert  Mental status is at baseline  Psychiatric:         Mood and Affect: Mood normal          Behavior: Behavior normal          Reviewed labs and imaging  Imaging:   XR foot 3+ vw right    Result Date: 11/24/2022  Narrative: RIGHT FOOT INDICATION:   Postoperative imaging  COMPARISON:  11/18/2022 VIEWS:  XR FOOT 3+ VW RIGHT FINDINGS: There is no acute fracture or dislocation  Status post 1st MTP arthrodesis with expected postoperative alignment and no hardware complication  Prominent plantar calcaneal spur noted  No lytic or blastic osseous lesion  Soft tissues are unremarkable  Impression: Status post 1st MTP arthrodesis with expected postoperative alignment and no hardware complication  Workstation performed: [id number]     XR foot 3+ vw right    Result Date: 11/24/2022  Narrative: C-arm - 1st MTP arthrodesis INDICATION: right foot fusion   Procedure guidance  COMPARISON:  10/6/2022 IMAGES:  3 FLUOROSCOPY TIME:   20 5 seconds TECHNIQUE: FINDINGS: Fluoroscopy was provided for procedure guidance  Impression: Fluoroscopy provided for procedure guidance  Please see separate procedure note for details    Workstation performed: [id number]       Labs:   Office Visit on 10/20/2022   Component Date Value Ref Range Status   • Ventricular Rate 10/20/2022 78  BPM Final   • Atrial Rate 10/20/2022 78  BPM Final   • AK Interval 10/20/2022 140  ms Final   • QRSD Interval 10/20/2022 84  ms Final   • QT Interval 10/20/2022 392  ms Final   • QTC Interval 10/20/2022 446  ms Final   • P Axis 10/20/2022 33  degrees Final   • QRS Axis 10/20/2022 33  degrees Final   • T Wave Axis 10/20/2022 38  degrees Final   • Ventricular Rate 10/20/2022 76  BPM Final   • Atrial Rate 10/20/2022 76  BPM Final   • AK Interval 10/20/2022 146  ms Final   • QRSD Interval 10/20/2022 90  ms Final   • QT Interval 10/20/2022 400  ms Final   • QTC Interval 10/20/2022 450  ms Final   • P Axis 10/20/2022 32  degrees Final   • QRS Axis 10/20/2022 32  degrees Final   • T Wave Axis 10/20/2022 33  degrees Final   Office Visit on 03/28/2022   Component Date Value Ref Range Status   • Sedimentation Rate 03/28/2022 4  0 - 40 mm/hr Final   • Rheumatoid Factor (RF) 03/28/2022 <10 0  <14 0 IU/mL Final   • C-Reactive Protein, Quant 03/28/2022 <1  0 - 10 mg/L Final   • Antinuclear Antibodies, IFA 03/28/2022 Negative   Final    Comment:                                      Negative   <1:80                                       Borderline  1:80                                       Positive   >1:80  ICAP nomenclature: AC-0  For more information about Hep-2 cell patterns use  ANApatterns  org, the official website for the International  Consensus on Antinuclear Antibody (PETE) Patterns (ICAP)

## 2022-12-06 NOTE — PATIENT INSTRUCTIONS
Continue the sulfasalazine 2 tabs twice a day  Please have your blood work (non-fasting) repeated in 8 weeks

## 2022-12-07 ENCOUNTER — OFFICE VISIT (OUTPATIENT)
Dept: PODIATRY | Facility: CLINIC | Age: 62
End: 2022-12-07

## 2022-12-07 VITALS
DIASTOLIC BLOOD PRESSURE: 81 MMHG | SYSTOLIC BLOOD PRESSURE: 134 MMHG | BODY MASS INDEX: 33.66 KG/M2 | HEART RATE: 71 BPM | HEIGHT: 63 IN

## 2022-12-07 DIAGNOSIS — Z09 POSTOP CHECK: Primary | ICD-10-CM

## 2022-12-08 NOTE — PROGRESS NOTES
This patient was seen on 12/7/22  Chief Complaint:  Joselito Terry is here for a post-op visit  Subjective:      Patient ID: Joselito Terry is a 63 y o  female  Lou Lawsonle is here for a post-op check  She is NWB and taking her aspirin 325mg daily  She denies post-op pain  She's about 3 weeks post-first MTPJ fusion  The following portions of the patient's history were reviewed and updated as appropriate: allergies, current medications, past family history, past medical history, past social history, past surgical history and problem list     Review of Systems   Constitutional: Negative  Respiratory: Negative  Objective:    Joselito Terry appears stable, non-toxic and alert  /81   Pulse 71   Ht 5' 3" (1 6 m)   LMP  (LMP Unknown)   BMI 33 66 kg/m²     Foot/Ankle Musculoskeletal Exam    General    Neurological: alert       Physical Exam  Vitals and nursing note reviewed  Constitutional:       General: She is not in acute distress  Appearance: Normal appearance  She is not ill-appearing, toxic-appearing or diaphoretic  Pulmonary:      Effort: Pulmonary effort is normal       Breath sounds: Normal breath sounds  Neurological:      Mental Status: She is alert  The incision is well-coapted without dehiscence, signs of infection, active drainage, necrosis  Calf is soft and non-tender  Neurovascular status is intact to the foot  Normal post-op edema and bruising is noted  Diagnoses and all orders for this visit:    Postop check         Problem List Items Addressed This Visit        Other    Postop check - Primary       Assessment:  Healing post-op site  Plan:  Sutures removed  Patient may now wash foot  I did recommend strict NWB until she is 4 weeks post-op  When beginning WB she must be in a camboot

## 2022-12-21 ENCOUNTER — OFFICE VISIT (OUTPATIENT)
Dept: PODIATRY | Facility: CLINIC | Age: 62
End: 2022-12-21

## 2022-12-21 VITALS
SYSTOLIC BLOOD PRESSURE: 142 MMHG | HEIGHT: 63 IN | DIASTOLIC BLOOD PRESSURE: 87 MMHG | HEART RATE: 64 BPM | WEIGHT: 191.4 LBS | BODY MASS INDEX: 33.91 KG/M2

## 2022-12-21 DIAGNOSIS — M20.21 HALLUX RIGIDUS OF RIGHT FOOT: Primary | ICD-10-CM

## 2022-12-21 DIAGNOSIS — Z09 POSTOP CHECK: ICD-10-CM

## 2022-12-21 NOTE — PROGRESS NOTES
This patient was seen on 12/21/22  Chief Complaint:  Patrizia Camara is here for a post-op visit  Subjective:      Patient ID: Patrizia Camara is a 63 y o  female  Hassel Rounds is here for a post-op check  She is FWB in [facility] and taking her aspirin 325mg daily  She denies post-op pain  She's about 4+ weeks post-first MTPJ fusion  The following portions of the patient's history were reviewed and updated as appropriate: allergies, current medications, past family history, past medical history, past social history, past surgical history and problem list     Review of Systems   Constitutional: Negative  Respiratory: Negative  Objective:    Patrizia Camara appears stable, non-toxic and alert  /87   Pulse 64   Ht 5' 3" (1 6 m)   Wt 86 8 kg (191 lb 6 4 oz)   LMP  (LMP Unknown)   BMI 33 90 kg/m²     Foot/Ankle Musculoskeletal Exam    General    Neurological: alert       Physical Exam  Vitals and nursing note reviewed  Constitutional:       General: She is not in acute distress  Appearance: Normal appearance  She is not ill-appearing, toxic-appearing or diaphoretic  Pulmonary:      Effort: Pulmonary effort is normal       Breath sounds: Normal breath sounds  Neurological:      Mental Status: She is alert  The incision is well-healed without dehiscence, signs of infection, active drainage, necrosis  Calf is soft and non-tender  Neurovascular status is intact to the foot  Diagnoses and all orders for this visit:    Hallux rigidus of right foot  -     X-ray foot right 3+ views; Future    Postop check         Problem List Items Addressed This Visit        Musculoskeletal and Integument    Hallux rigidus - Primary    Relevant Orders    X-ray foot right 3+ views       Other    Postop check       Assessment:  Healing post-op site  Plan:  Xrays ordered and checked; good consolidation at the fusion site noted with intact hardware   She may begin transition to sneakers as tolerated in two weeks  I will do a final check on her in 6 weeks

## 2023-01-19 DIAGNOSIS — E78.5 HYPERLIPIDEMIA, UNSPECIFIED HYPERLIPIDEMIA TYPE: ICD-10-CM

## 2023-01-19 RX ORDER — ATORVASTATIN CALCIUM 40 MG/1
40 TABLET, FILM COATED ORAL
Qty: 90 TABLET | Refills: 1 | Status: SHIPPED | OUTPATIENT
Start: 2023-01-19

## 2023-02-01 ENCOUNTER — TRANSCRIBE ORDERS (OUTPATIENT)
Dept: PAIN MEDICINE | Facility: CLINIC | Age: 63
End: 2023-02-01

## 2023-02-01 ENCOUNTER — OFFICE VISIT (OUTPATIENT)
Dept: PODIATRY | Facility: CLINIC | Age: 63
End: 2023-02-01

## 2023-02-01 VITALS — BODY MASS INDEX: 33.31 KG/M2 | WEIGHT: 188 LBS | HEIGHT: 63 IN

## 2023-02-01 DIAGNOSIS — Z09 POSTOP CHECK: Primary | ICD-10-CM

## 2023-02-01 NOTE — PROGRESS NOTES
This patient was seen on 2/1/23  Chief Complaint:  Slim Wolfe is here for a post-op visit  Subjective:      Patient ID: Slim Wolfe is a 63 y o  female  Alexandre Rivera is here for a post-op check  She is FWB in sneaker without post-op pain  She's about 8+ weeks post-first MTPJ fusion  The following portions of the patient's history were reviewed and updated as appropriate: allergies, current medications, past family history, past medical history, past social history, past surgical history and problem list     Review of Systems   Constitutional: Negative  Respiratory: Negative  Objective:    Slim Wolfe appears stable, non-toxic and alert  Ht 5' 3" (1 6 m)   Wt 85 3 kg (188 lb)   LMP  (LMP Unknown)   BMI 33 30 kg/m²     Foot/Ankle Musculoskeletal Exam    General    Neurological: alert       Physical Exam  Vitals and nursing note reviewed  Constitutional:       General: She is not in acute distress  Appearance: Normal appearance  She is not ill-appearing, toxic-appearing or diaphoretic  Pulmonary:      Effort: Pulmonary effort is normal       Breath sounds: Normal breath sounds  Neurological:      Mental Status: She is alert  The incision is well-healed without dehiscence, signs of infection, active drainage, necrosis  No edema  The hallux is well-positioned  No pain on palpation  Diagnoses and all orders for this visit:    Postop check         Problem List Items Addressed This Visit        Other    Postop check - Primary       Assessment:  Healing post-op site  Plan:  No further restrictions given  She may return PRN

## 2023-03-03 ENCOUNTER — OFFICE VISIT (OUTPATIENT)
Dept: GASTROENTEROLOGY | Facility: CLINIC | Age: 63
End: 2023-03-03

## 2023-03-03 VITALS
TEMPERATURE: 98.8 F | DIASTOLIC BLOOD PRESSURE: 62 MMHG | SYSTOLIC BLOOD PRESSURE: 120 MMHG | WEIGHT: 188 LBS | BODY MASS INDEX: 33.31 KG/M2 | HEIGHT: 63 IN

## 2023-03-03 DIAGNOSIS — Z12.11 SCREENING FOR COLON CANCER: ICD-10-CM

## 2023-03-03 DIAGNOSIS — Z86.010 HISTORY OF COLON POLYPS: Primary | ICD-10-CM

## 2023-03-03 DIAGNOSIS — K21.9 GERD WITHOUT ESOPHAGITIS: ICD-10-CM

## 2023-03-03 PROBLEM — Z86.0100 HISTORY OF COLON POLYPS: Status: ACTIVE | Noted: 2023-03-03

## 2023-03-03 RX ORDER — POLYETHYLENE GLYCOL 3350 17 G/17G
POWDER, FOR SOLUTION ORAL
Qty: 238 G | Refills: 0 | Status: SHIPPED | OUTPATIENT
Start: 2023-03-03

## 2023-03-03 NOTE — PATIENT INSTRUCTIONS
Scheduled date of colonoscopy (as of today):04 27 23  Physician performing colonoscopy:DR SIBLEY  Location of colonoscopy:[place]  Bowel prep reviewed with patient:DULCOLAX/MIRALAX  Instructions reviewed with patient by:MONO  Clearances:  N/A

## 2023-03-03 NOTE — PROGRESS NOTES
[facility] - Outpatient Consultation  Jared Hale 63 y o  female MRN: [id number]  Encounter: [id number]          ASSESSMENT AND PLAN:      1  Screening for colon cancer  - Ambulatory referral for colonoscopy  - Colonoscopy; Future  - polyethylene glycol (GLYCOLAX) 17 GM/SCOOP powder; At 5pm take 5mgx2 dulcolax  At 6pm 32oz miralax in 64oz gatorade  Drink 8oz glass every 5 mins until 32oz finished  Drink remaining as rec  Dispense: 238 g; Refill: 0  - bisacodyl (DULCOLAX) 5 mg EC tablet; Take 1 tablet (5 mg total) by mouth once for 1 dose  Dispense: 2 tablet; Refill: 0    2  GERD without esophagitis symptoms are well controlled with dietary modifications and   3  History of colon polyps    - Colonoscopy; Future  -Antireflux measures  -Continue dietary modification  -Colonoscopy will be planned    ______________________________________________________________________    HPI:    She is a 60-year-old female with history of colon polyps last colonoscopy in 2019 presents here for evaluation as she is due for colonoscopy now  She has no active cardiac or respiratory issues at this time  She is doing well overall  She does have history of acid reflux disease but symptoms are well controlled with dietary modification  There is no alarm symptoms  REVIEW OF SYSTEMS:    CONSTITUTIONAL: Denies any fever, chills, rigors, and weight loss  HEENT: No earache or tinnitus  Denies hearing loss or visual disturbances  CARDIOVASCULAR: No chest pain or palpitations  RESPIRATORY: Denies any cough, hemoptysis, shortness of breath or dyspnea on exertion  GASTROINTESTINAL: As noted in the History of Present Illness  GENITOURINARY: No problems with urination  Denies any hematuria or dysuria  NEUROLOGIC: No dizziness or vertigo, denies headaches  MUSCULOSKELETAL: Denies any muscle or joint pain  SKIN: Denies skin rashes or itching     ENDOCRINE: Denies excessive thirst  Denies intolerance to heat or cold  PSYCHOSOCIAL: Denies depression or anxiety  Denies any recent memory loss         Historical Information   Past Medical History:   Diagnosis Date   • Abnormal mammogram     RESOLVED 15AUG2017   • Arthritis    • Depression    • GERD (gastroesophageal reflux disease)     diet managed, occasional   • Hallux rigidus     LAST ASSESSED [id number] L grreat toe   • Heart murmur From birth   • Hyperlipidemia    • Lyme disease     LAST ASSESSED [id number]   • Murmur, cardiac     functional   • PONV (postoperative nausea and vomiting)    • Trigger finger     ,r hand ring finger and middle; L same fingers   • Trigger middle finger of left hand 4/12/2022   • Trigger ring finger of left hand 4/12/2022   • Wears glasses      Past Surgical History:   Procedure Laterality Date   • COLONOSCOPY      LAST ASSESSED [id number]   • DILATION AND CURETTAGE OF UTERUS     • ENDOMETRIAL ABLATION     • ENDOMETRIAL CRYOABLATION      WITH ULTRASOUND GUIDE    • IR EPIDURAL STEROID INJECTION     • KNEE ARTHROSCOPY      b/l meniscus   • [place] ARTHRODESIS GREAT TOE METATARSOPHALANGEAL JOINT Left 1/18/2019    Procedure: GREAT TOE FUSION WITH PLATE;  Surgeon: Connie Chavira DPM;  Location: [facility];  Service: Podiatry   • [place] ARTHRODESIS GREAT TOE METATARSOPHALANGEAL JOINT Right 11/18/2022    Procedure: Lory Nova / Barbara Kimble;  Surgeon: Connie Chavira DPM;  Location:  MAIN OR;  Service: Podiatry   • [place] TENDON SHEATH INCISION Left 4/29/2022    Procedure: RELEASE TRIGGER FINGER, LEFT MIDDLE  [address];  Surgeon: Sarah Mahoney MD;  Location:  MAIN OR;  Service: Orthopedics   • [place] TENDON SHEATH INCISION Right 6/22/2022    Procedure: RELEASE TRIGGER FINGER, RIGHT MIDDLE  [address];  Surgeon: Sarah Mahoney MD;  Location:  MAIN OR;  Service: Orthopedics   • TONSILLECTOMY AND ADENOIDECTOMY       Social History   Social History     Substance and Sexual Activity   Alcohol Use Yes    Comment: Very seldom maybe 5 times a year     Social History Substance and Sexual Activity   Drug Use Never     Social History     Tobacco Use   Smoking Status Never   Smokeless Tobacco Never     Family History   Problem Relation Age of Onset   • Heart disease Mother    • Colon polyps Mother    • Stroke Mother    • COPD Mother    • Hypertension Father    • Kidney failure Father    • Hyperlipidemia Father    • Coronary artery disease Family    • Hypertension Family    • Lung cancer Sister 61   • No Known Problems Daughter    • Breast cancer Maternal Grandmother 36   • Breast cancer Paternal Grandmother [de-identified]   • Breast cancer Maternal Aunt    • No Known Problems Maternal Aunt    • No Known Problems Maternal Aunt    • No Known Problems Maternal Aunt    • No Known Problems Paternal Aunt    • No Known Problems Sister    • No Known Problems Maternal Grandfather    • Arthritis Paternal Grandfather    • No Known Problems Son    • Cancer Sister        Meds/Allergies       Current Outpatient Medications:   •  bisacodyl (DULCOLAX) 5 mg EC tablet  •  polyethylene glycol (GLYCOLAX) 17 GM/SCOOP powder  •  atorvastatin (LIPITOR) 40 mg tablet  •  Calcium Carbonate-Vitamin D (CALCIUM 600+D PO)  •  meloxicam (Mobic) 15 mg tablet  •  multivitamin (THERAGRAN) TABS  •  sulfaSALAzine (AZULFIDINE) 500 mg tablet    No Known Allergies        Objective     Blood pressure 120/62, temperature 98 8 °F (37 1 °C), temperature source Tympanic, height 5' 3" (1 6 m), weight 85 3 kg (188 lb), not currently breastfeeding  Body mass index is 33 3 kg/m²  PHYSICAL EXAM:      General Appearance:   Alert, cooperative, no distress   HEENT:   Normocephalic, atraumatic, anicteric      Neck:  Supple, symmetrical, trachea midline   Lungs:   Clear to auscultation bilaterally; no rales, rhonchi or wheezing; respirations unlabored    Heart[de-identified]   Regular rate and rhythm; no murmur, rub, or gallop     Abdomen:   Soft, non-tender, non-distended; normal bowel sounds; no masses, no organomegaly    Genitalia:   Deferred  Rectal:   Deferred    Extremities:  No cyanosis, clubbing or edema    Pulses:  2+ and symmetric    Skin:  No jaundice, rashes, or lesions    Lymph nodes:  No palpable cervical lymphadenopathy        Lab Results:   No visits with results within 1 Day(s) from this visit  Latest known visit with results is:   Office Visit on 10/20/2022   Component Date Value   • Ventricular Rate 10/20/2022 78    • Atrial Rate 10/20/2022 78    • AR Interval 10/20/2022 140    • QRSD Interval 10/20/2022 84    • QT Interval 10/20/2022 392    • QTC Interval 10/20/2022 446    • P Axis 10/20/2022 33    • QRS Axis 10/20/2022 33    • T Wave Axis 10/20/2022 38    • Ventricular Rate 10/20/2022 76    • Atrial Rate 10/20/2022 76    • AR Interval 10/20/2022 146    • QRSD Interval 10/20/2022 90    • QT Interval 10/20/2022 400    • QTC Interval 10/20/2022 450    • P Axis 10/20/2022 32    • QRS Axis 10/20/2022 32    • T Wave Axis 10/20/2022 33          Radiology Results:   No results found  Answers for HPI/ROS submitted by the patient on 2/24/2023  Progression since onset: resolved  Pain - numeric: 0/10  Radiates to: does not radiate  anorexia: No  arthralgias: Yes  belching: No  constipation: No  diarrhea: No  dysuria: No  fever: No  flatus: No  frequency: No  headaches: No  hematochezia: No  hematuria: No  melena: No  myalgias: No  nausea:  No  weight loss: No  vomiting: No

## 2023-04-24 ENCOUNTER — TELEPHONE (OUTPATIENT)
Dept: OBGYN CLINIC | Facility: HOSPITAL | Age: 63
End: 2023-04-24

## 2023-04-24 NOTE — TELEPHONE ENCOUNTER
Caller: Woodrow Campbell    Doctor: Louretta Prader    Reason for call: had questions about lab work    Call back#: [phone]

## 2023-04-27 ENCOUNTER — ANESTHESIA (OUTPATIENT)
Dept: GASTROENTEROLOGY | Facility: MEDICAL CENTER | Age: 63
End: 2023-04-27

## 2023-04-27 ENCOUNTER — HOSPITAL ENCOUNTER (OUTPATIENT)
Dept: GASTROENTEROLOGY | Facility: MEDICAL CENTER | Age: 63
Setting detail: OUTPATIENT SURGERY
Discharge: HOME | End: 2023-04-27
Attending: INTERNAL MEDICINE | Admitting: INTERNAL MEDICINE

## 2023-04-27 ENCOUNTER — ANESTHESIA EVENT (OUTPATIENT)
Dept: GASTROENTEROLOGY | Facility: MEDICAL CENTER | Age: 63
End: 2023-04-27

## 2023-04-27 VITALS
OXYGEN SATURATION: 97 % | RESPIRATION RATE: 16 BRPM | HEIGHT: 63 IN | DIASTOLIC BLOOD PRESSURE: 58 MMHG | TEMPERATURE: 97.6 F | SYSTOLIC BLOOD PRESSURE: 102 MMHG | HEART RATE: 64 BPM | WEIGHT: 183 LBS | BODY MASS INDEX: 32.43 KG/M2

## 2023-04-27 DIAGNOSIS — Z86.010 HISTORY OF COLON POLYPS: ICD-10-CM

## 2023-04-27 DIAGNOSIS — Z12.11 SCREENING FOR COLON CANCER: ICD-10-CM

## 2023-04-27 RX ORDER — PROPOFOL 10 MG/ML
INJECTION, EMULSION INTRAVENOUS CONTINUOUS PRN
Status: DISCONTINUED | OUTPATIENT
Start: 2023-04-27 | End: 2023-04-27

## 2023-04-27 RX ORDER — PROPOFOL 10 MG/ML
INJECTION, EMULSION INTRAVENOUS AS NEEDED
Status: DISCONTINUED | OUTPATIENT
Start: 2023-04-27 | End: 2023-04-27

## 2023-04-27 RX ORDER — SODIUM CHLORIDE 9 MG/ML
125 INJECTION, SOLUTION INTRAVENOUS CONTINUOUS
Status: DISCONTINUED | OUTPATIENT
Start: 2023-04-27 | End: 2023-05-01 | Stop reason: HOSPADM

## 2023-04-27 RX ORDER — LIDOCAINE HYDROCHLORIDE 20 MG/ML
INJECTION, SOLUTION EPIDURAL; INFILTRATION; INTRACAUDAL; PERINEURAL AS NEEDED
Status: DISCONTINUED | OUTPATIENT
Start: 2023-04-27 | End: 2023-04-27

## 2023-04-27 RX ADMIN — PROPOFOL 130 MG: 10 INJECTION, EMULSION INTRAVENOUS at 10:58

## 2023-04-27 RX ADMIN — LIDOCAINE HYDROCHLORIDE 60 MG: 20 INJECTION, SOLUTION EPIDURAL; INFILTRATION; INTRACAUDAL at 10:58

## 2023-04-27 RX ADMIN — SODIUM CHLORIDE 125 ML/HR: 0.9 INJECTION, SOLUTION INTRAVENOUS at 10:53

## 2023-04-27 RX ADMIN — PROPOFOL 130 MCG/KG/MIN: 10 INJECTION, EMULSION INTRAVENOUS at 10:58

## 2023-04-27 RX ADMIN — PROPOFOL 20 MG: 10 INJECTION, EMULSION INTRAVENOUS at 11:11

## 2023-04-27 RX ADMIN — PROPOFOL 20 MG: 10 INJECTION, EMULSION INTRAVENOUS at 11:13

## 2023-04-27 RX ADMIN — PROPOFOL 40 MG: 10 INJECTION, EMULSION INTRAVENOUS at 11:14

## 2023-04-27 RX ADMIN — PROPOFOL 30 MG: 10 INJECTION, EMULSION INTRAVENOUS at 11:08

## 2023-04-27 NOTE — ANESTHESIA POSTPROCEDURE EVALUATION
"Post-Op Assessment Note    CV Status:  Stable    Pain management: adequate     Mental Status:  Alert and awake   Hydration Status:  Euvolemic   PONV Controlled:  Controlled   Airway Patency:  Patent      Post Op Vitals Reviewed: Yes      Staff: Anesthesiologist         No notable events documented      BP      Temp      Pulse     Resp      SpO2      /58   Pulse 64   Temp 97 6 °F (36 4 °C) (Tympanic)   Resp 16   Ht 5' 3\" (1 6 m)   Wt 83 kg (183 lb)   LMP  (LMP Unknown)   SpO2 97%   BMI 32 42 kg/m²     "

## 2023-04-27 NOTE — H&P
History and Physical - [facility]  Franco Veliz 66 y o  female MRN: [id number]                  HPI: Franco Veliz is a 66y o  year old female who presents for colonoscopy for screening for colorectal cancer evaluation  REVIEW OF SYSTEMS: Per the HPI, and otherwise unremarkable      Historical Information   Past Medical History:   Diagnosis Date   • Abnormal mammogram     RESOLVED 15AUG2017   • Arthritis    • Colon polyp    • Depression    • GERD (gastroesophageal reflux disease)     diet managed, occasional   • Hallux rigidus     LAST ASSESSED [id number] L grreat toe   • Heart murmur From birth   • Hyperlipidemia    • Lyme disease     LAST ASSESSED [id number]   • Murmur, cardiac     functional   • Trigger finger     ,r hand ring finger and middle; L same fingers   • Trigger middle finger of left hand 04/12/2022   • Trigger ring finger of left hand 04/12/2022   • Wears glasses      Past Surgical History:   Procedure Laterality Date   • COLONOSCOPY      LAST ASSESSED [id number]   • DILATION AND CURETTAGE OF UTERUS     • ENDOMETRIAL ABLATION     • ENDOMETRIAL CRYOABLATION      WITH ULTRASOUND GUIDE    • IR EPIDURAL STEROID INJECTION     • KNEE ARTHROSCOPY      b/l meniscus   • [place] ARTHRODESIS GREAT TOE METATARSOPHALANGEAL JOINT Left 1/18/2019    Procedure: GREAT TOE FUSION WITH PLATE;  Surgeon: Qing Soria DPM;  Location: [facility];  Service: Podiatry   • [place] ARTHRODESIS GREAT TOE METATARSOPHALANGEAL JOINT Right 11/18/2022    Procedure: Kelley Huffman / Haley Torres;  Surgeon: Qing Soria DPM;  Location:  MAIN OR;  Service: Podiatry   • [place] TENDON SHEATH INCISION Left 4/29/2022    Procedure: RELEASE TRIGGER FINGER, LEFT MIDDLE  [address];  Surgeon: Isabella Hills MD;  Location: [facility];  Service: Orthopedics   • [place] TENDON SHEATH INCISION Right 6/22/2022    Procedure: RELEASE TRIGGER FINGER, RIGHT MIDDLE  [address];  Surgeon: Isabella Hills MD;  Location: [facility];  Service: Orthopedics "  • TONSILLECTOMY AND ADENOIDECTOMY       Social History   Social History     Substance and Sexual Activity   Alcohol Use Yes    Comment: Very seldom maybe 5 times a year     Social History     Substance and Sexual Activity   Drug Use Never     Social History     Tobacco Use   Smoking Status Never   Smokeless Tobacco Never     Family History   Problem Relation Age of Onset   • Heart disease Mother    • Colon polyps Mother    • Stroke Mother    • COPD Mother    • Hypertension Father    • Kidney failure Father    • Hyperlipidemia Father    • Coronary artery disease Family    • Hypertension Family    • Lung cancer Sister 61   • No Known Problems Daughter    • Breast cancer Maternal Grandmother 36   • Breast cancer Paternal Grandmother [de-identified]   • Breast cancer Maternal Aunt    • No Known Problems Maternal Aunt    • No Known Problems Maternal Aunt    • No Known Problems Maternal Aunt    • No Known Problems Paternal Aunt    • No Known Problems Sister    • No Known Problems Maternal Grandfather    • Arthritis Paternal Grandfather    • No Known Problems Son    • Cancer Sister        Meds/Allergies       Current Outpatient Medications:   •  sulfaSALAzine (AZULFIDINE) 500 mg tablet  •  atorvastatin (LIPITOR) 40 mg tablet  •  bisacodyl (DULCOLAX) 5 mg EC tablet  •  Calcium Carbonate-Vitamin D (CALCIUM 600+D PO)  •  meloxicam (Mobic) 15 mg tablet  •  multivitamin (THERAGRAN) TABS  •  polyethylene glycol (GLYCOLAX) 17 GM/SCOOP powder    No Known Allergies    Objective     /65   Pulse 65   Temp 97 6 °F (36 4 °C) (Tympanic)   Resp 16   Ht 5' 3\" (1 6 m)   Wt 83 kg (183 lb)   LMP  (LMP Unknown)   SpO2 96%   BMI 32 42 kg/m²       PHYSICAL EXAM    Gen: NAD  Head: NCAT  CV: RRR  CHEST: Clear  ABD: soft, NT/ND  EXT: no edema      ASSESSMENT/PLAN:  This is a 66y o  year old female here for colonoscopy evaluation, and she is stable and optimized for her procedure        "

## 2023-04-27 NOTE — ANESTHESIA PREPROCEDURE EVALUATION
Procedure:  COLONOSCOPY    Relevant Problems   CARDIO   (+) Hyperlipidemia   (+) Murmur, cardiac      GI/HEPATIC   (+) GERD without esophagitis      NEURO/PSYCH   (+) History of colon polyps   (+) Other depressive disorder        Physical Exam    Airway    Mallampati score: II  TM Distance: >3 FB  Neck ROM: full     Dental   No notable dental hx     Cardiovascular  Rhythm: regular, Rate: normal, Cardiovascular exam normal    Pulmonary  Pulmonary exam normal Breath sounds clear to auscultation,     Other Findings        Anesthesia Plan  ASA Score- 2     Anesthesia Type- IV sedation with anesthesia with ASA Monitors  Additional Monitors:   Airway Plan:           Plan Factors-    Chart reviewed  Patient summary reviewed  Induction-     Postoperative Plan-     Informed Consent- Anesthetic plan and risks discussed with patient

## 2023-05-02 PROBLEM — Z12.11 SCREENING FOR COLON CANCER: Status: RESOLVED | Noted: 2023-03-03 | Resolved: 2023-05-02

## 2023-07-05 DIAGNOSIS — E78.5 HYPERLIPIDEMIA, UNSPECIFIED HYPERLIPIDEMIA TYPE: ICD-10-CM

## 2023-07-05 RX ORDER — ATORVASTATIN CALCIUM 40 MG/1
40 TABLET, FILM COATED ORAL
Qty: 90 TABLET | Refills: 1 | Status: SHIPPED | OUTPATIENT
Start: 2023-07-05

## 2023-07-10 ENCOUNTER — OFFICE VISIT (OUTPATIENT)
Dept: RHEUMATOLOGY | Facility: CLINIC | Age: 63
End: 2023-07-10
Payer: COMMERCIAL

## 2023-07-10 VITALS
DIASTOLIC BLOOD PRESSURE: 86 MMHG | WEIGHT: 186.8 LBS | HEART RATE: 69 BPM | SYSTOLIC BLOOD PRESSURE: 154 MMHG | HEIGHT: 63 IN | BODY MASS INDEX: 33.1 KG/M2

## 2023-07-10 DIAGNOSIS — M19.049 HAND ARTHRITIS: ICD-10-CM

## 2023-07-10 DIAGNOSIS — M47.819 PERIPHERAL SPONDYLOARTHRITIS: Primary | ICD-10-CM

## 2023-07-10 DIAGNOSIS — D72.819 LEUKOPENIA, UNSPECIFIED TYPE: ICD-10-CM

## 2023-07-10 DIAGNOSIS — Z79.899 HIGH RISK MEDICATION USE: ICD-10-CM

## 2023-07-10 DIAGNOSIS — M47.819 SPONDYLOARTHRITIS: ICD-10-CM

## 2023-07-10 PROCEDURE — 99214 OFFICE O/P EST MOD 30 MIN: CPT | Performed by: PHYSICIAN ASSISTANT

## 2023-07-10 RX ORDER — MELOXICAM 15 MG/1
15 TABLET ORAL DAILY
Qty: 90 TABLET | Refills: 1 | Status: SHIPPED | OUTPATIENT
Start: 2023-07-10 | End: 2023-10-08

## 2023-07-10 RX ORDER — SULFASALAZINE 500 MG/1
1000 TABLET ORAL 2 TIMES DAILY
Qty: 360 TABLET | Refills: 1 | Status: SHIPPED | OUTPATIENT
Start: 2023-07-10 | End: 2023-10-08

## 2023-07-10 NOTE — PROGRESS NOTES
Assessment and Plan:   Ms. Roberto Delarosa is a 57-year-old female who presents for rheumatology follow-up of spondyloarthritis with peripheral arthritis. The patient has responded well to sulfasalazine 1000 mg twice daily. Most recent lab results from [address] [place] [address] drawn 5/1/2023 reveal persistently low WBC (~2.9 since at least 12/2022), but otherwise unremarkable in terms of remainder of CMC, CMP, and inflammation markers. Since baseline WBC was ~4.3 prior to starting SSZ, the leukopenia could be medication-induced. We discussed that we will continue to monitor WBC closely with every 3-month high risk medication monitoring labs since she is asymptomatic, and if worsens, will discontinue SSZ and change agents. Continue Meloxicam 15mg daily PRN. Follow-up with Dr. Tolu Coronado in 6 months and continue high risk medication monitoring labs every 3 months. Plan:  Diagnoses and all orders for this visit:    Peripheral spondyloarthritis  -     CBC and differential; Standing  -     Comprehensive metabolic panel; Standing  -     C-reactive protein; Standing  -     Sedimentation rate, automated; Standing  -     CBC and differential  -     Comprehensive metabolic panel  -     C-reactive protein  -     Sedimentation rate, automated    Spondyloarthritis  -     CBC and differential; Standing  -     Comprehensive metabolic panel; Standing  -     CBC and differential  -     Comprehensive metabolic panel    High risk medication use  -     CBC and differential; Standing  -     Comprehensive metabolic panel; Standing  -     C-reactive protein; Standing  -     Sedimentation rate, automated; Standing  -     CBC and differential  -     Comprehensive metabolic panel  -     C-reactive protein  -     Sedimentation rate, automated    Hand arthritis  -     meloxicam (Mobic) 15 mg tablet; Take 1 tablet (15 mg total) by mouth daily  -     sulfaSALAzine (AZULFIDINE) 500 mg tablet;  Take 2 tablets (1,000 mg total) by mouth 2 (two) times a day    Leukopenia, unspecified type        I have personally reviewed prior notes, recent laboratory results, and pertinent films in PACS. Activities as tolerated. Exercise: try to maintain a low impact exercise regimen as much as possible. Walk for 30 minutes a day for at least 3 days a week. Continue other medications as prescribed by PCP and other specialists. RTC in 6 months with Dr. Todd Lai    Rheumatic Disease Summary:  1. Spondyloarthritis with peripheral arthritis  -Initial visit 3/29/22:  Here for initial eval.  She has had mult. trigger fingers. She has joint pain, swelling and stiffness. +inflammatory spinal pain. No psoriasis or inflammatory eye disease. Likely spondyloarthritis with peripheral arthritis--check labs. Xrays of hands/SI joints. Continue PT. Standing NSAIDs x 4 weeks. Begin SSA 500mg BID.  -Visit 8/2/22: Still with joint pain and morning stiffness. Also with LBP and stiffness. Taking SSA 500mg BID with improvement in her symptoms. S/p trigger finger release b/l 3rd/4th digit flexor tendons. Most recent labs unavailable for review. Xray of SI joints reviewed and no evidence of sacroiliitis. Increase SSA to 1000 BID. Meloxicam PRN  -Visit 12/6/2022: S/p joint fusion right foot 11/18/22. Scheduled for podiatric f/u this week. Intermittent DIP pain with weather changes. No side effects on SSA. Mild increased ALT. Cont SSA 1000mg BID.  -Visit 7/10/2023: persistently low WBC ~2.9. Monitor closely. No IA on exam. Cont SSZ 1000mg BID and meloxicam 15mg daily PRN. 2.  Other comorbidities: Trigger finger, GERD, lumbar radiculopathy     HPI  Ms. India Higginbotham is a 42-year-old female who presents for rheumatology follow-up of spondyloarthritis with peripheral arthritis. The patient states that she has been doing well since the last visit, with only occasional right knee pain. No joint swelling, heat, tenderness. Morning stiffness lasts about 30 minutes at most.  No back pain or stiffness.   No recent or recurrent infections. The following portions of the patient's history were reviewed and updated as appropriate: allergies, current medications, past family history, past medical history, past social history, past surgical history and problem list.      Review of Systems  Constitutional: Negative for weight change, fevers, chills, night sweats, fatigue. ENT/Mouth: Negative for hearing changes, ear pain, nasal congestion, sinus pain, hoarseness, sore throat, rhinorrhea, swallowing difficulty. Eyes: Negative for pain, redness, discharge, vision changes. Cardiovascular: Negative for chest pain, SOB, palpitations. Respiratory: Negative for cough, sputum, wheezing, dyspnea. Gastrointestinal: Negative for nausea, vomiting, diarrhea, constipation, pain, heartburn. Genitourinary: Negative for dysuria, urinary frequency, hematuria. Musculoskeletal: As per HPI. Skin: Negative for skin rash, color changes. Neuro: Negative for weakness, numbness, tingling, loss of consciousness. Psych: Negative for anxiety, depression. Heme/Lymph: Negative for easy bruising, bleeding, lymphadenopathy.         Past Medical History:   Diagnosis Date   • Abnormal mammogram     RESOLVED [id number]   • Arthritis    • Colon polyp    • Depression    • GERD (gastroesophageal reflux disease)     diet managed, occasional   • Hallux rigidus     LAST ASSESSED [id number] L grreat toe   • Heart murmur From birth   • Hyperlipidemia    • Lyme disease     LAST ASSESSED [id number]   • Murmur, cardiac     functional   • Trigger finger     ,r hand ring finger and middle; L same fingers   • Trigger middle finger of left hand 04/12/2022   • Trigger ring finger of left hand 04/12/2022   • Wears glasses        Past Surgical History:   Procedure Laterality Date   • COLONOSCOPY      LAST ASSESSED [id number]   • DILATION AND CURETTAGE OF UTERUS     • ENDOMETRIAL ABLATION     • ENDOMETRIAL CRYOABLATION      WITH ULTRASOUND GUIDE    • IR EPIDURAL STEROID INJECTION     • KNEE ARTHROSCOPY      b/l meniscus   • [place] ARTHRODESIS GREAT TOE METATARSOPHALANGEAL JOINT Left 1/18/2019    Procedure: GREAT TOE FUSION WITH PLATE;  Surgeon: Mardee Buerger, DPM;  Location: [facility];  Service: Podiatry   • [place] ARTHRODESIS GREAT TOE METATARSOPHALANGEAL JOINT Right 11/18/2022    Procedure: Christopher Crews / Natali Duggan;  Surgeon: Mardee Buerger, DPM;  Location: [facility];  Service: Podiatry   • [place] TENDON SHEATH INCISION Left 4/29/2022    Procedure: RELEASE TRIGGER FINGER, LEFT MIDDLE  [facility];  Surgeon: Evangelista Castro MD;  Location: [facility];  Service: Orthopedics   • [place] TENDON SHEATH INCISION Right 6/22/2022    Procedure: RELEASE TRIGGER FINGER, RIGHT MIDDLE AND [id number] [place] [place];  Surgeon: Evangelista Castro MD;  Location:  MAIN OR;  Service: Orthopedics   • TONSILLECTOMY AND ADENOIDECTOMY         Social History     Socioeconomic History   • Marital status: /Civil Union     Spouse name: Not on file   • Number of children: 2   • Years of education: Not on file   • Highest education level: Not on file   Occupational History   • Occupation: Office work     Comment: Part-time   Tobacco Use   • Smoking status: Never   • Smokeless tobacco: Never   Vaping Use   • Vaping Use: Never used   Substance and Sexual Activity   • Alcohol use: Yes     Comment: Very seldom maybe 5 times a year   • Drug use: Never   • Sexual activity: Yes     Partners: Male     Birth control/protection: Male Sterilization   Other Topics Concern   • Not on file   Social History Narrative    EXERCISE REGULARLY      Social Determinants of Health     Financial Resource Strain: Low Risk  (8/31/2020)    Overall Financial Resource Strain (CARDIA)    • Difficulty of Paying Living Expenses: Not hard at all   Food Insecurity: No Food Insecurity (8/31/2020)    Hunger Vital Sign    • Worried About Running Out of Food in the Last Year: Never true    • Ran Out of Food in the Last Year: Never true   Transportation Needs: No Transportation Needs (8/31/2020)    PRAPARE - Transportation    • Lack of Transportation (Medical): No    • Lack of Transportation (Non-Medical): No   Physical Activity: Sufficiently Active (10/25/2021)    Exercise Vital Sign    • Days of Exercise per Week: 3 days    • Minutes of Exercise per Session: 60 min   Recent Concern: Physical Activity - Insufficiently Active (10/11/2021)    Exercise Vital Sign    • Days of Exercise per Week: 2 days    • Minutes of Exercise per Session: 60 min   Stress: Stress Concern Present (10/25/2021)    [facility]    • Feeling of Stress : Very much   Social Connections:  Moderately Integrated (8/31/2020)    Social Connection and Isolation Panel [NHANES]    • Frequency of Communication with Friends and Family: More than three times a week    • Frequency of Social Gatherings with Friends and Family: More than three times a week    • Attends [employer] Services: 1 to 4 times per year    • Active Member of Clubs or Organizations: No    • Attends Club or Organization Meetings: Never    • Marital Status:    Intimate Partner Violence: Not At Risk (10/26/2022)    Humiliation, Afraid, Rape, and Kick questionnaire    • Fear of Current or Ex-Partner: No    • Emotionally Abused: No    • Physically Abused: No    • Sexually Abused: No   Housing Stability: Not on file       Family History   Problem Relation Age of Onset   • Heart disease Mother    • Colon polyps Mother    • Stroke Mother    • COPD Mother    • Hypertension Father    • Kidney failure Father    • Hyperlipidemia Father    • Coronary artery disease Family    • Hypertension Family    • Lung cancer Sister 61   • No Known Problems Daughter    • Breast cancer Maternal Grandmother 36   • Breast cancer Paternal Grandmother 80   • Breast cancer Maternal Aunt    • No Known Problems Maternal Aunt    • No Known Problems Maternal Aunt    • No Known Problems Maternal Aunt    • No Known Problems Paternal Aunt    • No Known Problems Sister    • No Known Problems Maternal Grandfather    • Arthritis Paternal Grandfather    • No Known Problems Son    • Cancer Sister        No Known Allergies      Current Outpatient Medications:   •  atorvastatin (LIPITOR) 40 mg tablet, Take 1 tablet (40 mg total) by mouth daily at bedtime, Disp: 90 tablet, Rfl: 1  •  Calcium Carbonate-Vitamin D (CALCIUM 600+D PO), Take 1 tablet by mouth daily, Disp: , Rfl:   •  meloxicam (Mobic) 15 mg tablet, Take 1 tablet (15 mg total) by mouth daily, Disp: 90 tablet, Rfl: 1  •  multivitamin (THERAGRAN) TABS, Take 1 tablet by mouth daily at bedtime  , Disp: , Rfl:   •  sulfaSALAzine (AZULFIDINE) 500 mg tablet, Take 2 tablets (1,000 mg total) by mouth 2 (two) times a day, Disp: 360 tablet, Rfl: 1      Objective:    Vitals:    07/10/23 1237   BP: 154/86   BP Location: Left arm   Patient Position: Sitting   Cuff Size: Standard   Pulse: 69   Weight: 84.7 kg (186 lb 12.8 oz)   Height: 5' 3" (1.6 m)       Physical Exam  General: Well appearing, well nourished, in no acute distress. Oriented x 3, normal mood and affect. Ambulating without difficulty. Skin: Good turgor, no rash, unusual bruising or prominent lesions. Hair: Normal texture and distribution. Nails: Normal color, no deformities. HEENT:  Head: Normocephalic, atraumatic. Eyes: Conjunctiva clear, sclera non-icteric, EOM intact. Nose: No external lesions, mucosa non-inflamed. Mouth: Mucous membranes moist, no mucosal lesions. Neck: Supple  Extremities: No amputations or deformities, cyanosis, edema. Musculoskeletal:   + Heberden's nodes of multiple DIPs. + Crepitus bilateral knees R>L. Mild soft tissue swelling of medial aspect of right patella. No tenderness to palpation or swelling of joints bilaterally to include: shoulder, elbow, wrist, MCP I-V, PIP I-V, ankle, MTP I-V.   Normal active and passive ROM of neck and bilateral upper and lower extremities. Neurologic: Alert and oriented. No focal neurological deficits appreciated. Psychiatric: Normal mood and affect.        Anisha Kohli PA-C  Rheumatology

## 2023-08-09 ENCOUNTER — OFFICE VISIT (OUTPATIENT)
Dept: URGENT CARE | Facility: CLINIC | Age: 63
End: 2023-08-09
Payer: COMMERCIAL

## 2023-08-09 VITALS
OXYGEN SATURATION: 97 % | WEIGHT: 186 LBS | DIASTOLIC BLOOD PRESSURE: 78 MMHG | TEMPERATURE: 98.2 F | HEART RATE: 75 BPM | HEIGHT: 63 IN | RESPIRATION RATE: 16 BRPM | BODY MASS INDEX: 32.96 KG/M2 | SYSTOLIC BLOOD PRESSURE: 130 MMHG

## 2023-08-09 DIAGNOSIS — J01.10 ACUTE NON-RECURRENT FRONTAL SINUSITIS: Primary | ICD-10-CM

## 2023-08-09 PROCEDURE — 99213 OFFICE O/P EST LOW 20 MIN: CPT | Performed by: FAMILY MEDICINE

## 2023-08-09 RX ORDER — AZITHROMYCIN 250 MG/1
TABLET, FILM COATED ORAL
Qty: 6 TABLET | Refills: 0 | Status: SHIPPED | OUTPATIENT
Start: 2023-08-09 | End: 2023-08-13

## 2023-08-09 RX ORDER — METHYLPREDNISOLONE 4 MG/1
TABLET ORAL
Qty: 21 TABLET | Refills: 0 | Status: SHIPPED | OUTPATIENT
Start: 2023-08-09

## 2023-08-09 NOTE — PROGRESS NOTES
[facility]        NAME: Nellene Saint is a 66 y.o. female  : 1960    MRN: [id number]  DATE: 2023  TIME: 9:55 AM    Assessment and Plan   Acute non-recurrent frontal sinusitis [J01.10]  1. Acute non-recurrent frontal sinusitis  azithromycin (ZITHROMAX) 250 mg tablet    methylPREDNISolone 4 MG tablet therapy pack            Patient Instructions       Follow up with PCP in 3-5 days. Proceed to  ER if symptoms worsen. Chief Complaint     Chief Complaint   Patient presents with   • Cold Like Symptoms     Pt reports sinus congestion, pressure, post nasal drip and productive cough with onset of symptoms Friday with progression. Managing symptoms at home with Flonase. History of Present Illness       62-year-old female with 5-day history of increased nasal congestion, sinus pressure and headaches. Denies any fevers or chills. Review of Systems   Review of Systems   Constitutional: Negative. HENT: Positive for congestion, sinus pressure and sinus pain. Eyes: Negative. Respiratory: Negative. Cardiovascular: Negative. Gastrointestinal: Negative. Endocrine: Negative. Genitourinary: Negative. Musculoskeletal: Negative. Skin: Negative. Allergic/Immunologic: Negative. Neurological: Negative. Hematological: Negative. Psychiatric/Behavioral: Negative.           Current Medications       Current Outpatient Medications:   •  atorvastatin (LIPITOR) 40 mg tablet, Take 1 tablet (40 mg total) by mouth daily at bedtime, Disp: 90 tablet, Rfl: 1  •  azithromycin (ZITHROMAX) 250 mg tablet, Take 2 tablets today then 1 tablet daily x 4 days, Disp: 6 tablet, Rfl: 0  •  Calcium Carbonate-Vitamin D (CALCIUM 600+D PO), Take 1 tablet by mouth daily, Disp: , Rfl:   •  meloxicam (Mobic) 15 mg tablet, Take 1 tablet (15 mg total) by mouth daily, Disp: 90 tablet, Rfl: 1  •  methylPREDNISolone 4 MG tablet therapy pack, Use as directed on package, Disp: 21 tablet, Rfl: 0  • multivitamin (THERAGRAN) TABS, Take 1 tablet by mouth daily at bedtime  , Disp: , Rfl:   •  sulfaSALAzine (AZULFIDINE) 500 mg tablet, Take 2 tablets (1,000 mg total) by mouth 2 (two) times a day, Disp: 360 tablet, Rfl: 1    Current Allergies     Allergies as of 08/09/2023   • (No Known Allergies)            The following portions of the patient's history were reviewed and updated as appropriate: allergies, current medications, past family history, past medical history, past social history, past surgical history and problem list.     Past Medical History:   Diagnosis Date   • Abnormal mammogram     RESOLVED [id number]   • Arthritis    • Colon polyp    • Depression    • GERD (gastroesophageal reflux disease)     diet managed, occasional   • Hallux rigidus     LAST ASSESSED [id number] L grreat toe   • Heart murmur From birth   • Hyperlipidemia    • Lyme disease     LAST ASSESSED [id number]   • Murmur, cardiac     functional   • Trigger finger     ,r hand ring finger and middle; L same fingers   • Trigger middle finger of left hand 04/12/2022   • Trigger ring finger of left hand 04/12/2022   • Wears glasses        Past Surgical History:   Procedure Laterality Date   • COLONOSCOPY      LAST ASSESSED [id number]   • DILATION AND CURETTAGE OF UTERUS     • ENDOMETRIAL ABLATION     • ENDOMETRIAL CRYOABLATION      WITH ULTRASOUND GUIDE    • IR EPIDURAL STEROID INJECTION     • KNEE ARTHROSCOPY      b/l meniscus   • [place] ARTHRODESIS GREAT TOE METATARSOPHALANGEAL JOINT Left 1/18/2019    Procedure: GREAT TOE FUSION WITH PLATE;  Surgeon: Barbara Lau DPM;  Location[facility];  Service: Podiatry   • [place] ARTHRODESIS GREAT TOE METATARSOPHALANGEAL JOINT Right 11/18/2022    Procedure: Jeni Booty / Linda Gupta;  Surgeon: Barbara Lau DPM;  Location:  MAIN OR;  Service: Podiatry   • [place] TENDON SHEATH INCISION Left 4/29/2022    Procedure: RELEASE TRIGGER FINGER, LEFT MIDDLE  [facility];  Surgeon: Crystal Ho MD;  Location:  MAIN OR; Service: Orthopedics   • [place] TENDON SHEATH INCISION Right 6/22/2022    Procedure: RELEASE TRIGGER FINGER, RIGHT MIDDLE AND RING FINGER;  Surgeon: Evangelista Castro MD;  Location:  MAIN OR;  Service: Orthopedics   • TONSILLECTOMY AND ADENOIDECTOMY         Family History   Problem Relation Age of Onset   • Heart disease Mother    • Colon polyps Mother    • Stroke Mother    • COPD Mother    • Hypertension Father    • Kidney failure Father    • Hyperlipidemia Father    • Coronary artery disease Family    • Hypertension Family    • Lung cancer Sister 61   • No Known Problems Daughter    • Breast cancer Maternal Grandmother 36   • Breast cancer Paternal Grandmother 80   • Breast cancer Maternal Aunt    • No Known Problems Maternal Aunt    • No Known Problems Maternal Aunt    • No Known Problems Maternal Aunt    • No Known Problems Paternal Aunt    • No Known Problems Sister    • No Known Problems Maternal Grandfather    • Arthritis Paternal Grandfather    • No Known Problems Son    • Cancer Sister          Medications have been verified. Objective   /78 (BP Location: Left arm, Patient Position: Sitting)   Pulse 75   Temp 98.2 °F (36.8 °C)   Resp 16   Ht 5' 3" (1.6 m)   Wt 84.4 kg (186 lb)   LMP  (LMP Unknown)   SpO2 97%   BMI 32.95 kg/m²   No LMP recorded (lmp unknown). Patient is postmenopausal.       Physical Exam     Physical Exam  Vitals and nursing note reviewed. Constitutional:       Appearance: She is well-developed. HENT:      Head: Normocephalic. Nose: Nose normal.   Eyes:      Pupils: Pupils are equal, round, and reactive to light. Cardiovascular:      Rate and Rhythm: Normal rate and regular rhythm. Pulmonary:      Effort: Pulmonary effort is normal.   Musculoskeletal:         General: Normal range of motion. Skin:     General: Skin is warm and dry. Neurological:      Mental Status: She is alert and oriented to person, place, and time.

## 2023-08-17 ENCOUNTER — TELEPHONE (OUTPATIENT)
Age: 63
End: 2023-08-17

## 2023-08-17 NOTE — TELEPHONE ENCOUNTER
Caller:Patient    Doctor: Georgie Ogden    Reason for call: Pt calling to let dr know she had her bloodwork done at [facility] yesterday so they can view results    Call back#: [phone]

## 2023-08-23 ENCOUNTER — HOSPITAL ENCOUNTER (OUTPATIENT)
Dept: MAMMOGRAPHY | Facility: CLINIC | Age: 63
Discharge: HOME | End: 2023-08-23
Payer: COMMERCIAL

## 2023-08-23 VITALS — WEIGHT: 187 LBS | HEIGHT: 63 IN | BODY MASS INDEX: 33.13 KG/M2

## 2023-08-23 DIAGNOSIS — Z12.31 ENCOUNTER FOR SCREENING MAMMOGRAM FOR MALIGNANT NEOPLASM OF BREAST: ICD-10-CM

## 2023-08-23 PROCEDURE — 77063 BREAST TOMOSYNTHESIS BI: CPT

## 2023-08-23 PROCEDURE — 77067 SCR MAMMO BI INCL CAD: CPT

## 2023-09-11 ENCOUNTER — OFFICE VISIT (OUTPATIENT)
Dept: FAMILY MEDICINE CLINIC | Facility: CLINIC | Age: 63
End: 2023-09-11
Payer: COMMERCIAL

## 2023-09-11 ENCOUNTER — APPOINTMENT (OUTPATIENT)
Dept: RADIOLOGY | Facility: CLINIC | Age: 63
End: 2023-09-11
Payer: COMMERCIAL

## 2023-09-11 VITALS
BODY MASS INDEX: 33.88 KG/M2 | SYSTOLIC BLOOD PRESSURE: 138 MMHG | DIASTOLIC BLOOD PRESSURE: 84 MMHG | RESPIRATION RATE: 17 BRPM | HEART RATE: 71 BPM | OXYGEN SATURATION: 97 % | WEIGHT: 191.2 LBS | TEMPERATURE: 98.2 F | HEIGHT: 63 IN

## 2023-09-11 DIAGNOSIS — G89.29 CHRONIC PAIN OF RIGHT KNEE: ICD-10-CM

## 2023-09-11 DIAGNOSIS — M25.561 CHRONIC PAIN OF RIGHT KNEE: ICD-10-CM

## 2023-09-11 DIAGNOSIS — L40.9 PSORIASIS: ICD-10-CM

## 2023-09-11 DIAGNOSIS — Z00.00 ENCOUNTER FOR WELL ADULT EXAM WITHOUT ABNORMAL FINDINGS: Primary | ICD-10-CM

## 2023-09-11 DIAGNOSIS — Z79.899 HIGH RISK MEDICATION USE: ICD-10-CM

## 2023-09-11 DIAGNOSIS — R73.01 IMPAIRED FASTING GLUCOSE: ICD-10-CM

## 2023-09-11 DIAGNOSIS — E78.2 MIXED HYPERLIPIDEMIA: ICD-10-CM

## 2023-09-11 PROBLEM — Z09 POSTOP CHECK: Status: RESOLVED | Noted: 2022-11-23 | Resolved: 2023-09-11

## 2023-09-11 PROBLEM — Z47.89 AFTERCARE FOLLOWING SURGERY OF THE MUSCULOSKELETAL SYSTEM: Status: RESOLVED | Noted: 2022-05-06 | Resolved: 2023-09-11

## 2023-09-11 PROCEDURE — 73562 X-RAY EXAM OF KNEE 3: CPT

## 2023-09-11 PROCEDURE — 99396 PREV VISIT EST AGE 40-64: CPT | Performed by: FAMILY MEDICINE

## 2023-09-11 NOTE — PROGRESS NOTES
Assessment/Plan:     Diagnoses and all orders for this visit:    Encounter for well adult exam without abnormal findings    Chronic pain of right knee  -     XR knee 3 vw right non injury; Future    Psoriasis  -     Ambulatory Referral to Dermatology; Future    Mixed hyperlipidemia  -     Lipid panel; Future  -     Lipid panel    Impaired fasting glucose  -     Hemoglobin A1C; Future  -     Hemoglobin A1C    High risk medication use  -     UA (URINE) with reflex to Scope; Future  -     TSH, 3rd generation; Future  -     UA (URINE) with reflex to Scope  -     TSH, 3rd generation      Labs ordered  X-ray of her right knee ordered  Patient for to dermatology  She will continue her current medications  She can follow-up in 1 year or sooner if needed      Subjective:     Chief Complaint   Patient presents with   • Annual Exam     Pt c/o itchy area to left wrist, requests derm referral.      • Knee Pain     Left knee pain, wondering if arthritis, sees rheum tomorrow. Patient ID: Chikis Garcia is a 66 y.o. female. Patient was in today for yearly physical  She does have an itchy spot on her left arm that she would like looked at  She is also having left knee pain she does follow with rheumatology      The following portions of the patient's history were reviewed and updated as appropriate: allergies, current medications, past family history, past medical history, past social history, past surgical history and problem list.    Review of Systems   Constitutional: Negative. HENT: Negative. Eyes: Negative. Respiratory: Negative. Cardiovascular: Negative. Gastrointestinal: Negative. Endocrine: Negative. Genitourinary: Negative. Musculoskeletal: Negative. Skin: Negative. Allergic/Immunologic: Negative. Neurological: Negative. Hematological: Negative. Psychiatric/Behavioral: Negative. All other systems reviewed and are negative.         Objective:    Vitals:    09/11/23 1355 BP: 138/84   BP Location: Left arm   Patient Position: Sitting   Cuff Size: Standard   Pulse: 71   Resp: 17   Temp: 98.2 °F (36.8 °C)   TempSrc: Tympanic   SpO2: 97%   Weight: 86.7 kg (191 lb 3.2 oz)   Height: 5' 3.1" (1.603 m)          Physical Exam  Vitals and nursing note reviewed. Constitutional:       Appearance: She is well-developed. HENT:      Head: Normocephalic and atraumatic. Right Ear: External ear normal.      Left Ear: External ear normal.   Eyes:      Conjunctiva/sclera: Conjunctivae normal.      Pupils: Pupils are equal, round, and reactive to light. Cardiovascular:      Rate and Rhythm: Normal rate and regular rhythm. Heart sounds: Normal heart sounds. Pulmonary:      Effort: Pulmonary effort is normal.      Breath sounds: Normal breath sounds. Abdominal:      General: Bowel sounds are normal.      Palpations: Abdomen is soft. Musculoskeletal:         General: Normal range of motion. Cervical back: Normal range of motion. Skin:     General: Skin is warm and dry. Neurological:      Mental Status: She is alert and oriented to person, place, and time. Deep Tendon Reflexes: Reflexes are normal and symmetric. Psychiatric:         Behavior: Behavior normal.         Thought Content: Thought content normal.         Judgment: Judgment normal.         BMI Counseling: Body mass index is 33.76 kg/m². The BMI is above normal. Nutrition recommendations include reducing portion sizes, decreasing overall calorie intake, 3-5 servings of fruits/vegetables daily, reducing fast food intake, consuming healthier snacks, decreasing soda and/or juice intake, moderation in carbohydrate intake, increasing intake of lean protein, reducing intake of saturated fat and trans fat and reducing intake of cholesterol. Exercise recommendations include exercising 3-5 times per week.

## 2023-09-12 ENCOUNTER — OFFICE VISIT (OUTPATIENT)
Dept: RHEUMATOLOGY | Facility: CLINIC | Age: 63
End: 2023-09-12
Payer: COMMERCIAL

## 2023-09-12 ENCOUNTER — TELEPHONE (OUTPATIENT)
Dept: RHEUMATOLOGY | Facility: CLINIC | Age: 63
End: 2023-09-12

## 2023-09-12 VITALS
WEIGHT: 188.1 LBS | BODY MASS INDEX: 33.33 KG/M2 | DIASTOLIC BLOOD PRESSURE: 80 MMHG | HEIGHT: 63 IN | SYSTOLIC BLOOD PRESSURE: 124 MMHG

## 2023-09-12 DIAGNOSIS — Z79.899 HIGH RISK MEDICATION USE: ICD-10-CM

## 2023-09-12 DIAGNOSIS — D72.819 LEUKOPENIA, UNSPECIFIED TYPE: ICD-10-CM

## 2023-09-12 DIAGNOSIS — M47.819 SPONDYLOARTHRITIS: Primary | ICD-10-CM

## 2023-09-12 DIAGNOSIS — Z11.1 ENCOUNTER FOR SCREENING FOR RESPIRATORY TUBERCULOSIS: ICD-10-CM

## 2023-09-12 DIAGNOSIS — M47.819 PERIPHERAL SPONDYLOARTHRITIS: ICD-10-CM

## 2023-09-12 PROCEDURE — 99215 OFFICE O/P EST HI 40 MIN: CPT | Performed by: PHYSICIAN ASSISTANT

## 2023-09-12 NOTE — TELEPHONE ENCOUNTER
Please initiate PA for Humira 40 mg every 14-day injections. Diagnosis: Spondyloarthritis with peripheral arthritis, psoriasis    Tried: Sulfasalazine, meloxicam, naproxen, tylenol, prednisone  (cannot use MTX due to low white blood cell count)    Hep panel negative.   Updated TB test pending

## 2023-09-12 NOTE — PROGRESS NOTES
Assessment and Plan:   Ms. Angel Luis Flores is a 73-year-old female who presents for rheumatology follow-up of spondyloarthritis with peripheral arthritis. Although the patient's joint symptoms have been stable on sulfasalazine and meloxicam, she continues with persistent leukopenia, with most recent WBC at 2.7 on 8/16/2023. Other labs reviewed and pertinent findings include serum creatinine 1.02, EGFR 63, LFTs normal, sed rate 3, CRP less than 1. We discussed that the sulfasalazine is most likely contributing to the leukopenia, therefore would recommend changing agents. Due to EGFR of 63, I would also recommend discontinuation of meloxicam.  I would avoid the use of methotrexate due to the leukopenia and instead consider stepping up to anti-TNF such as Humira 40mg every 14-day injections. Benefits and risks of TNF inhibitors such as Humira discussed, and include but are not limited to leukopenia, reactivation of hepatitis B or TB, lymphoma, infusion or site reactions, exacerbation of heart failure, and increased risk of infections. We will obtain prior authorization. Hep panel negative from 3/2022. We will update QuantiFERON gold repeat CBC and BMP prior to initiation of the medication. Follow-up in 3 months. Plan:  Diagnoses and all orders for this visit:    Spondyloarthritis  -     CBC and differential  -     Basic metabolic panel    Peripheral spondyloarthritis  -     CBC and differential    High risk medication use  -     CBC and differential  -     Basic metabolic panel    Leukopenia, unspecified type  -     CBC and differential    Encounter for screening for respiratory tuberculosis  -     Quantiferon TB Gold Plus        I have personally reviewed prior notes, recent laboratory results, and pertinent films in PACS. Activities as tolerated. Exercise: try to maintain a low impact exercise regimen as much as possible. Walk for 30 minutes a day for at least 3 days a week.    Continue other medications as prescribed by PCP and other specialists. RTC in 3 mo    Rheumatic Disease Summary:  1. Spondyloarthritis with peripheral arthritis, psoriasis  -Initial visit 3/29/22: Galilea Sahu for initial eval.  She has had mult. trigger fingers.  She has joint pain, swelling and stiffness.  +inflammatory spinal pain.  No psoriasis or inflammatory eye disease.   Likely spondyloarthritis with peripheral arthritis--check labs. Xrays of hands/SI joints. Continue PT. Standing NSAIDs x 4 weeks. Begin SSA 500mg BID.  -Visit 8/2/22: Still with joint pain and morning stiffness.  Also with LBP and stiffness.  Taking SSA 500mg BID with improvement in her symptoms.  S/p trigger finger release b/l 3rd/4th digit flexor tendons.  Most recent labs unavailable for review.  Xray of SI joints reviewed and no evidence of sacroiliitis. Increase SSA to 1000 BID. Meloxicam PRN  -Visit 12/6/2022: S/p joint fusion right foot 11/18/22.  Scheduled for podiatric f/u this week.  Intermittent DIP pain with weather changes.  No side effects on SSA. Mild increased ALT. Cont SSA 1000mg BID.  -Visit 7/10/2023: persistently low WBC ~2.9. Monitor closely. No IA on exam. Cont SSZ 1000mg BID and meloxicam 15mg daily PRN.  -Visit 9/12/2023: D/c SSZ due to leukopenia (WBC 2.7). D/c meloxicam due to eGFR~60. Repeat CBC/BMP and get PA for Humira. 2.  Other comorbidities: Trigger finger, GERD, lumbar radiculopathy       HPI  Ms. Andi Cortes is a 81-year-old female who presents for rheumatology follow-up of spondyloarthritis with peripheral arthritis. Patient states that she had a sinus infection approximately 1 month ago around the time of lab draw. The patient states that she was recently traveling to meloxicam as needed which did help with her joint pain. She did notice an improvement in her low back pain after epidural injections. She does still experience low back pain and stiffness as well as peripheral joint pain occasionally.     The following portions of the patient's history were reviewed and updated as appropriate: allergies, current medications, past family history, past medical history, past social history, past surgical history and problem list.      Review of Systems  Constitutional: Negative for weight change, fevers, chills, night sweats, fatigue. ENT/Mouth: Negative for hearing changes, ear pain, nasal congestion, sinus pain, hoarseness, sore throat, rhinorrhea, swallowing difficulty. Eyes: Negative for pain, redness, discharge, vision changes. Cardiovascular: Negative for chest pain, SOB, palpitations. Respiratory: Negative for cough, sputum, wheezing, dyspnea. Gastrointestinal: Negative for nausea, vomiting, diarrhea, constipation, pain, heartburn. Genitourinary: Negative for dysuria, urinary frequency, hematuria. Musculoskeletal: As per HPI. Skin: Negative for skin rash, color changes. Neuro: Negative for weakness, numbness, tingling, loss of consciousness. Psych: Negative for anxiety, depression. Heme/Lymph: Negative for easy bruising, bleeding, lymphadenopathy.         Past Medical History:   Diagnosis Date   • Abnormal mammogram     RESOLVED [id number]   • Allergic     Seasonal   • Arthritis    • Colon polyp    • Depression    • GERD (gastroesophageal reflux disease)     diet managed, occasional   • Hallux rigidus     LAST ASSESSED [id number] L grreat toe   • Heart murmur From birth   • Hyperlipidemia    • Lyme disease     LAST ASSESSED [id number]   • Murmur, cardiac     functional   • Trigger finger     ,r hand ring finger and middle; L same fingers   • Trigger middle finger of left hand 04/12/2022   • Trigger ring finger of left hand 04/12/2022   • Wears glasses        Past Surgical History:   Procedure Laterality Date   • COLONOSCOPY      LAST ASSESSED [id number]   • DILATION AND CURETTAGE OF UTERUS     • ENDOMETRIAL ABLATION     • ENDOMETRIAL CRYOABLATION      WITH ULTRASOUND GUIDE    • IR EPIDURAL STEROID INJECTION     • KNEE ARTHROSCOPY      b/l meniscus   • GA ARTHRODESIS GREAT TOE METATARSOPHALANGEAL JOINT Left 1/18/2019    Procedure: GREAT TOE FUSION WITH PLATE;  Surgeon: Abdulkadir Gutiérrez DPM;  Location: [facility];  Service: Podiatry   • GA ARTHRODESIS GREAT TOE METATARSOPHALANGEAL JOINT Right 11/18/2022    Procedure: Efe Shine / Amie Cook;  Surgeon: Abdulkadir Gutiérrez DPM;  Location: [facility];  Service: Podiatry   • GA TENDON SHEATH INCISION Left 4/29/2022    Procedure: RELEASE TRIGGER FINGER, LEFT MIDDLE  [address];  Surgeon: Marielena Willoughby MD;  Location:  MAIN OR;  Service: Orthopedics   • GA TENDON SHEATH INCISION Right 6/22/2022    Procedure: RELEASE TRIGGER FINGER, RIGHT MIDDLE AND [address];  Surgeon: Marielena Willoughby MD;  Location:  MAIN OR;  Service: Orthopedics   • TONSILLECTOMY AND ADENOIDECTOMY         Social History     Socioeconomic History   • Marital status: /Civil Union     Spouse name: Not on file   • Number of children: 2   • Years of education: Not on file   • Highest education level: Not on file   Occupational History   • Occupation: Office work     Comment: Part-time   Tobacco Use   • Smoking status: Never   • Smokeless tobacco: Never   Vaping Use   • Vaping Use: Never used   Substance and Sexual Activity   • Alcohol use: Yes     Comment: Very occasionally   • Drug use: No   • Sexual activity: Yes     Partners: Male     Birth control/protection: Male Sterilization   Other Topics Concern   • Not on file   Social History Narrative    EXERCISE REGULARLY      Social Determinants of Health     Financial Resource Strain: Low Risk  (8/31/2020)    Overall Financial Resource Strain (CARDIA)    • Difficulty of Paying Living Expenses: Not hard at all   Food Insecurity: No Food Insecurity (8/31/2020)    Hunger Vital Sign    • Worried About Running Out of Food in the Last Year: Never true    • Ran Out of Food in the Last Year: Never true   Transportation Needs: No Transportation Needs (8/31/2020)    [facility]    • Lack of Transportation (Medical): No    • Lack of Transportation (Non-Medical): No   Physical Activity: Sufficiently Active (10/25/2021)    Exercise Vital Sign    • Days of Exercise per Week: 3 days    • Minutes of Exercise per Session: 60 min   Recent Concern: Physical Activity - Insufficiently Active (10/11/2021)    Exercise Vital Sign    • Days of Exercise per Week: 2 days    • Minutes of Exercise per Session: 60 min   Stress: Stress Concern Present (10/25/2021)    [facility]    • Feeling of Stress : Very much   Social Connections:  Moderately Integrated (8/31/2020)    Social Connection and Isolation Panel [NHANES]    • Frequency of Communication with Friends and Family: More than three times a week    • Frequency of Social Gatherings with Friends and Family: More than three times a week    • Attends Orthodox Services: 1 to 4 times per year    • Active Member of Clubs or Organizations: No    • Attends Club or Organization Meetings: Never    • Marital Status:    Intimate Partner Violence: Not At Risk (10/26/2022)    Humiliation, Afraid, Rape, and Kick questionnaire    • Fear of Current or Ex-Partner: No    • Emotionally Abused: No    • Physically Abused: No    • Sexually Abused: No   Housing Stability: Not on file       Family History   Problem Relation Age of Onset   • Heart disease Mother         Heart failure and valve replacement   • Colon polyps Mother    • Stroke Mother    • COPD Mother    • Hypertension Father    • Kidney failure Father    • Hyperlipidemia Father    • Coronary artery disease Family    • Hypertension Family    • Lung cancer Sister 61   • Asthma Daughter    • Breast cancer Maternal Grandmother 36   • Breast cancer Paternal Grandmother 80   • Arthritis Paternal Grandmother    • Breast cancer Maternal Aunt    • No Known Problems Maternal Aunt    • No Known Problems Maternal Aunt    • No Known Problems Maternal Aunt    • No Known Problems Paternal Aunt    • No Known Problems Sister    • No Known Problems Maternal Grandfather    • Arthritis Paternal Grandfather    • No Known Problems Son    • Cancer Sister         Lung cancer with the ALK gene       No Known Allergies      Current Outpatient Medications:   •  atorvastatin (LIPITOR) 40 mg tablet, Take 1 tablet (40 mg total) by mouth daily at bedtime, Disp: 90 tablet, Rfl: 1  •  Calcium Carbonate-Vitamin D (CALCIUM 600+D PO), Take 1 tablet by mouth daily, Disp: , Rfl:   •  multivitamin (THERAGRAN) TABS, Take 1 tablet by mouth daily at bedtime  , Disp: , Rfl:       Objective:    Vitals:    09/12/23 1437   BP: 124/80   Weight: 85.3 kg (188 lb 1.6 oz)   Height: 5' 3.1" (1.603 m)       Physical Exam  General: Well appearing, well nourished, in no acute distress. Oriented x 3, normal mood and affect. Ambulating without difficulty. Skin: Good turgor, no rash, unusual bruising or prominent lesions. Hair: Normal texture and distribution. Nails: Normal color, no deformities. HEENT:  Head: Normocephalic, atraumatic. Eyes: Conjunctiva clear, sclera non-icteric, EOM intact. Nose: No external lesions   Mouth: Mucous membranes moist  Neck: Supple  Musculoskeletal:   No asymmetry or deformities noted of bilateral upper and lower extremities. Neurologic: Alert and oriented. No focal neurological deficits appreciated. Psychiatric: Normal mood and affect.        Jaison Saavedra PA-C  Rheumatology

## 2023-09-12 NOTE — PATIENT INSTRUCTIONS
I will try for PA for Humira  Get TB test and repeat CBC and BMP   Stop sulfasalazine   Give us a call or [email or web address] message when you get the medicine and we can bring you in for nurse's visit for education

## 2023-09-14 DIAGNOSIS — M47.819 PERIPHERAL SPONDYLOARTHRITIS: Primary | ICD-10-CM

## 2023-09-14 DIAGNOSIS — M47.819 SPONDYLOARTHRITIS: ICD-10-CM

## 2023-09-14 DIAGNOSIS — Z79.899 HIGH RISK MEDICATION USE: ICD-10-CM

## 2023-09-14 LAB
APPEARANCE UR: CLEAR
BILIRUB UR QL STRIP: NEGATIVE
CHOLEST SERPL-MCNC: 179 MG/DL (ref 100–199)
CHOLEST/HDLC SERPL: 3.1 RATIO (ref 0–4.4)
COLOR UR: YELLOW
EST. AVERAGE GLUCOSE BLD GHB EST-MCNC: 88 MG/DL
GLUCOSE UR QL: NEGATIVE
HBA1C MFR BLD: 4.7 % (ref 4.8–5.6)
HDLC SERPL-MCNC: 58 MG/DL
HGB UR QL STRIP: NEGATIVE
KETONES UR QL STRIP: NEGATIVE
LDLC SERPL CALC-MCNC: 107 MG/DL (ref 0–99)
LEUKOCYTE ESTERASE UR QL STRIP: NEGATIVE
MICRO URNS: NORMAL
NITRITE UR QL STRIP: NEGATIVE
PH UR STRIP: 7 [PH] (ref 5–7.5)
PROT UR QL STRIP: NEGATIVE
SL AMB VLDL CHOLESTEROL CALC: 14 MG/DL (ref 5–40)
SP GR UR: 1.01 (ref 1–1.03)
TRIGL SERPL-MCNC: 77 MG/DL (ref 0–149)
TSH SERPL DL<=0.005 MIU/L-ACNC: 5.62 UIU/ML (ref 0.45–4.5)
UROBILINOGEN UR STRIP-ACNC: 0.2 MG/DL (ref 0.2–1)

## 2023-09-14 RX ORDER — ADALIMUMAB 40MG/0.4ML
40 KIT SUBCUTANEOUS
Qty: 2 EACH | Refills: 6 | Status: SHIPPED | OUTPATIENT
Start: 2023-09-14 | End: 2023-10-14

## 2023-09-15 ENCOUNTER — TELEPHONE (OUTPATIENT)
Dept: FAMILY MEDICINE CLINIC | Facility: CLINIC | Age: 63
End: 2023-09-15

## 2023-09-15 ENCOUNTER — TELEPHONE (OUTPATIENT)
Age: 63
End: 2023-09-15

## 2023-09-15 NOTE — TELEPHONE ENCOUNTER
Caller: Patient     Doctor: Magen Schreiber     Reason for call: Re: labs were done and all results are in except the TB test.   Re: medication change to Humira but needs prior auth. She would like to know the cost for her before the meds are ordered.      Call back#: [phone] or [phone] ok to leave message

## 2023-09-15 NOTE — TELEPHONE ENCOUNTER
Sondra Hill called and stated that she has a TSH record from 5 years ago, Aug 17, that [address] [place] [address] had ordered. Not sure if this number will help you or not. The TSH number was [phone]. She will be home until 12:30-1:00 if you could call her or if it is after that you can leave a message. Please advise. Number to reach her is [phone].     Thank You

## 2023-09-18 DIAGNOSIS — M17.11 ARTHRITIS OF RIGHT KNEE: Primary | ICD-10-CM

## 2023-09-22 ENCOUNTER — OFFICE VISIT (OUTPATIENT)
Dept: OBGYN CLINIC | Facility: CLINIC | Age: 63
End: 2023-09-22
Payer: COMMERCIAL

## 2023-09-22 VITALS
HEART RATE: 63 BPM | HEIGHT: 63 IN | DIASTOLIC BLOOD PRESSURE: 84 MMHG | BODY MASS INDEX: 33.13 KG/M2 | WEIGHT: 187 LBS | SYSTOLIC BLOOD PRESSURE: 163 MMHG

## 2023-09-22 DIAGNOSIS — M17.11 PRIMARY OSTEOARTHRITIS OF RIGHT KNEE: Primary | ICD-10-CM

## 2023-09-22 PROCEDURE — 20610 DRAIN/INJ JOINT/BURSA W/O US: CPT | Performed by: STUDENT IN AN ORGANIZED HEALTH CARE EDUCATION/TRAINING PROGRAM

## 2023-09-22 PROCEDURE — 99214 OFFICE O/P EST MOD 30 MIN: CPT | Performed by: STUDENT IN AN ORGANIZED HEALTH CARE EDUCATION/TRAINING PROGRAM

## 2023-09-22 RX ORDER — LIDOCAINE HYDROCHLORIDE 10 MG/ML
4 INJECTION, SOLUTION INFILTRATION; PERINEURAL
Status: COMPLETED | OUTPATIENT
Start: 2023-09-22 | End: 2023-09-22

## 2023-09-22 RX ORDER — TRIAMCINOLONE ACETONIDE 40 MG/ML
40 INJECTION, SUSPENSION INTRA-ARTICULAR; INTRAMUSCULAR
Status: COMPLETED | OUTPATIENT
Start: 2023-09-22 | End: 2023-09-22

## 2023-09-22 RX ADMIN — LIDOCAINE HYDROCHLORIDE 4 ML: 10 INJECTION, SOLUTION INFILTRATION; PERINEURAL at 09:30

## 2023-09-22 RX ADMIN — TRIAMCINOLONE ACETONIDE 40 MG: 40 INJECTION, SUSPENSION INTRA-ARTICULAR; INTRAMUSCULAR at 09:30

## 2023-09-22 NOTE — PROGRESS NOTES
Knee New Office Note    Assessment:     1. Primary osteoarthritis of right knee        Plan:  Findings today are consistent with osteoarthritis of the right knee. Imaging and prognosis was reviewed with the patient today. Discussed conservative treatment options in the form of cortisone steroid injection, VISCO injections, anti-inflammatories, low impact exercises and Voltaren gel vs surgical intervention (TKA). Patient wishes to proceed with conservative treatments. Cortisone steroid injection was administered today. Patient should avoid strenuous activities for the next 1-2 days. Patient should avoid vaccines for the next 2 weeks if possible. If patient is diabetic, should monitor glucose levels for the next 7 to 10 days. Can apply cold compress for soreness. If patient feels relief with the cortisone injections, procedure can be repeated every 3 months. If patient sees minimal/no relief with cortisone injection, treatment with VISCO injections can be further discussed. Patient would benefit from low impact exercises such as stationary biking, swimming and flat surface walking. Recommend patient start Humira which was prescribed from her rheumatologist, as it may help with overall knee pain. Follow up 3 months       Problem List Items Addressed This Visit    None  Visit Diagnoses     Primary osteoarthritis of right knee    -  Primary    Relevant Orders    Large joint arthrocentesis         Subjective:     Patient ID: John Perla is a 66 y.o. female. Chief Complaint:  HPI:  The patient presents with a chief complaint of right knee pain. The pain began several year(s) ago and is not associated with an acute injury. She notes it has worsened over the last 6 months. The patient describes the pain as aching and 8 out of 10 in intensity. It is constant in timing, and localizes the pain to the anterior medial joint line.  The pain is worse with walking, standing, ascending stairs, descending stairs, squatting, kneeling, twisting and the first couple of steps when going from sitting to standing and relieved with rest, medication: Tylenol used and beneficial, avoiding painful activities. She denies mechanical symptoms such as locking and catching. She denies instability of the knee. Patient has not had any treatment for the right knee. She has history of a medial menisectomy 20+ years ago. She notes she follows with a rheumatologist for peripheral spondyloarthritis and was previously taking sulfasalazine, but due recent lab work, white blood count trending low, she has been advised to discontinue and recommended starting Humira. She has not begun as she wanted to wait until after this appointment.     Allergy:  No Known Allergies  Medications:  all current active meds have been reviewed  Past Medical History:  Past Medical History:   Diagnosis Date   • Abnormal mammogram     RESOLVED [id number]   • Allergic     Seasonal   • Arthritis    • Colon polyp    • Depression    • GERD (gastroesophageal reflux disease)     diet managed, occasional   • Hallux rigidus     LAST ASSESSED [id number] L grreat toe   • Heart murmur From birth   • Hyperlipidemia    • Lyme disease     LAST ASSESSED [id number]   • Murmur, cardiac     functional   • Trigger finger     ,r hand ring finger and middle; L same fingers   • Trigger middle finger of left hand 04/12/2022   • Trigger ring finger of left hand 04/12/2022   • Wears glasses      Past Surgical History:  Past Surgical History:   Procedure Laterality Date   • COLONOSCOPY      LAST ASSESSED [id number]   • DILATION AND CURETTAGE OF UTERUS     • ENDOMETRIAL ABLATION     • ENDOMETRIAL CRYOABLATION      WITH ULTRASOUND GUIDE    • IR EPIDURAL STEROID INJECTION     • KNEE ARTHROSCOPY      b/l meniscus   • HI ARTHRODESIS GREAT TOE METATARSOPHALANGEAL JOINT Left 1/18/2019    Procedure: GREAT TOE FUSION WITH PLATE;  Surgeon: Bolivar Reyes DPM;  Location: [facility];  Service: Podiatry   • HI ARTHRODESIS GREAT TOE METATARSOPHALANGEAL JOINT Right 11/18/2022    Procedure: Olga Lidia Merritt / FUSION FOOT;  Surgeon: Rashad Thompson DPM;  Location:  MAIN OR;  Service: Podiatry   • NH TENDON SHEATH INCISION Left 4/29/2022    Procedure: RELEASE TRIGGER FINGER, LEFT MIDDLE  [place] [place];  Surgeon: Sarwat Shetty MD;  Location:  MAIN OR;  Service: Orthopedics   • NH TENDON SHEATH INCISION Right 6/22/2022    Procedure: RELEASE TRIGGER FINGER, RIGHT MIDDLE AND [address];  Surgeon: Sarwat Shetty MD;  Location:  MAIN OR;  Service: Orthopedics   • TONSILLECTOMY AND ADENOIDECTOMY       Family History:  Family History   Problem Relation Age of Onset   • Heart disease Mother         Heart failure and valve replacement   • Colon polyps Mother    • Stroke Mother    • COPD Mother    • Hypertension Father    • Kidney failure Father    • Hyperlipidemia Father    • Coronary artery disease Family    • Hypertension Family    • Lung cancer Sister 61   • Asthma Daughter    • Breast cancer Maternal Grandmother 36   • Breast cancer Paternal Grandmother 80   • Arthritis Paternal Grandmother    • Breast cancer Maternal Aunt    • No Known Problems Maternal Aunt    • No Known Problems Maternal Aunt    • No Known Problems Maternal Aunt    • No Known Problems Paternal Aunt    • No Known Problems Sister    • No Known Problems Maternal Grandfather    • Arthritis Paternal Grandfather    • No Known Problems Son    • Cancer Sister         Lung cancer with the ALK gene     Social History:  Social History     Substance and Sexual Activity   Alcohol Use Yes    Comment: Very occasionally     Social History     Substance and Sexual Activity   Drug Use No     Social History     Tobacco Use   Smoking Status Never   Smokeless Tobacco Never           ROS:  General: Per HPI  Skin: Negative, except if noted below  HEENT: Negative  Respiratory: Negative  Cardiovascular: Negative  Gastrointestinal: Negative  Urinary: Negative  Vascular: Negative  Musculoskeletal: Positive per HPI   Neurologic: Positive per HPI  Endocrine: Negative    Objective:  BP Readings from Last 1 Encounters:   09/22/23 163/84      Wt Readings from Last 1 Encounters:   09/22/23 84.8 kg (187 lb)        Respiratory:   non-labored respirations    Lymphatics:  no palpable lymph nodes    Gait:   altered    Neurologic:   Alert and oriented times 3  Patient with normal sensation except as noted below  Deep tendon reflexes 2+ except as noted in MSK exam    Bilateral Lower Extremity:  Right knee: Inspection:  Skin intact    Overall limb alignment mild varus    Effusion: none    ROM 5-115 w/ pain    Extensor Lag: none    Palpation: medial Joint line tenderness to palpation    AP Stability at 90 deg stable    M/L stability in full extension stable    M/L stability in midflexion stable    Motor: 5/5 Q/HS/TA/GS/P    Pulses: 2+ DP / 2+ PT    SILT DP/SP/S/S/TN    Imaging:  My interpretation XR AP scanogram/AP bilateral knee/lateral/erwin/sunrise right knee: severe joint space narrowing, subchondral sclerosis, subchondral cysts, osteophyte formation. No fracture or dislocation. Significant varus     Large joint arthrocentesis: R knee  Universal Protocol:  Consent: Verbal consent obtained. Risks and benefits: risks, benefits and alternatives were discussed  Consent given by: patient  Time out: Immediately prior to procedure a "time out" was called to verify the correct patient, procedure, equipment, support staff and site/side marked as required.   Timeout called at: 9/22/2023 10:04 AM.  Patient understanding: patient states understanding of the procedure being performed  Site marked: the operative site was marked  Patient identity confirmed: verbally with patient    Supporting Documentation  Indications: pain   Procedure Details  Location: knee - R knee  Preparation: Patient was prepped and draped in the usual sterile fashion  Needle size: 22 G  Ultrasound guidance: no  Approach: anterolateral  Medications administered: 4 mL lidocaine 1 %; 40 mg triamcinolone acetonide 40 mg/mL    Patient tolerance: patient tolerated the procedure well with no immediate complications  Dressing:  Sterile dressing applied          BMI:   Estimated body mass index is 33.02 kg/m² as calculated from the following:    Height as of this encounter: 5' 3.1" (1.603 m). Weight as of this encounter: 84.8 kg (187 lb). BSA:   Estimated body surface area is 1.88 meters squared as calculated from the following:    Height as of this encounter: 5' 3.1" (1.603 m). Weight as of this encounter: 84.8 kg (187 lb).            Scribe Attestation    I,:  Leida Kramer am acting as a scribe while in the presence of the attending physician.:       I,:  Jenelle Bingham DO personally performed the services described in this documentation    as scribed in my presence.:

## 2023-10-08 PROBLEM — J01.10 ACUTE NON-RECURRENT FRONTAL SINUSITIS: Status: RESOLVED | Noted: 2023-08-09 | Resolved: 2023-10-08

## 2023-12-11 NOTE — PROGRESS NOTES
Assessment and Plan:   Ms. Maris Juarez is a 51-year-old female who presents for rheumatology follow-up of spondyloarthritis with peripheral arthritis. Matilde Garza was changed over from sulfasalazine to Humira as of September 2023 due to leukopenia with sulfasalazine. The patient has noticed some minor improvement since starting Humira, but still with dactylitis of the right second digit and morning stiffness lasting about 1 to 1.5 hours. We discussed that we will give Humira a few more weeks to reach peak effectiveness. Unfortunately, her co-pay is going to be very high with the next fill and therefore we will need to discuss other options if she is not eligible for [employer] assist.  Will initiate prior authorization for Enbrel injections now so that we can determine the co-pay of this alternative medication. Update labs now to include CBC's, CMP, ESR, CRP. Follow-up with orthopedics in regards to severe right knee osteoarthritis and consideration for next steps since corticosteroid injection wearing off. We also discussed anti-inflammatory diet and I provided handouts. Follow-up in 4 months    Plan:  Diagnoses and all orders for this visit:    Peripheral spondyloarthritis  -     CBC and differential  -     Comprehensive metabolic panel  -     C-reactive protein  -     Sedimentation rate, automated    High risk medication use  -     CBC and differential  -     Comprehensive metabolic panel  -     C-reactive protein  -     Sedimentation rate, automated    Leukopenia, unspecified type  -     CBC and differential  -     Comprehensive metabolic panel  -     C-reactive protein  -     Sedimentation rate, automated    Spondyloarthritis    Chronic pain of both knees    Primary osteoarthritis of right knee    Other orders  -     Humira Pen 40 MG/0.4ML PNKT; Inject 40 mg under the skin        I have personally reviewed prior notes, recent laboratory results, and pertinent films in PACS. Activities as tolerated. Exercise: try to maintain a low impact exercise regimen as much as possible. Walk for 30 minutes a day for at least 3 days a week. Continue other medications as prescribed by PCP and other specialists. RTC in 4 months    Rheumatic Disease Summary:  1. Spondyloarthritis with peripheral arthritis, psoriasis  -Initial visit 3/29/22:  Here for initial eval.  She has had mult. trigger fingers. She has joint pain, swelling and stiffness. +inflammatory spinal pain. No psoriasis or inflammatory eye disease. Likely spondyloarthritis with peripheral arthritis--check labs. Xrays of hands/SI joints. Continue PT. Standing NSAIDs x 4 weeks. Begin SSA 500mg BID.  -Visit 8/2/22: Still with joint pain and morning stiffness. Also with LBP and stiffness. Taking SSA 500mg BID with improvement in her symptoms. S/p trigger finger release b/l 3rd/4th digit flexor tendons. Most recent labs unavailable for review. Xray of SI joints reviewed and no evidence of sacroiliitis. Increase SSA to 1000 BID. Meloxicam PRN  -Visit 12/6/2022: S/p joint fusion right foot 11/18/22. Scheduled for podiatric f/u this week. Intermittent DIP pain with weather changes. No side effects on SSA. Mild increased ALT. Cont SSA 1000mg BID.  -Visit 7/10/2023: persistently low WBC ~2.9. Monitor closely. No IA on exam. Cont SSZ 1000mg BID and meloxicam 15mg daily PRN.  -Visit 9/12/2023: D/c SSZ due to leukopenia (WBC 2.7). D/c meloxicam due to eGFR~60. Repeat CBC/BMP and get PA for Humira. -Visit 12/12/2023: Starting to notice some improvement on Humira, but co-pay now going to be high. Get PA for Enbrel  2. OA  -Right knee severe OA- received CSI by Ortho 9/22/2023  3. Other comorbidities: Trigger finger, GERD, lumbar radiculopathy       HPI  Ms. Teja Muro is a 80-year-old female who presents for rheumatology follow-up of spondyloarthritis with peripheral arthritis.      Starting to notice some improvement on Humira, but co-pay now going to be high (most recent fill was over $400 and going forward will be $1000 a month). She states that her morning stiffness is lasting about 1 to 1.5 hours. She has noticed a swelling of her right first and second finger recently. Occasional pain of the left shoulder when she is reaching for something, but otherwise no pain at rest or when she is sleeping. She also has pain of the bilateral knees, but the right knee is worse. She received a corticosteroid injection from Ortho which she feels is already wearing off. She is considering the neck steps. The following portions of the patient's history were reviewed and updated as appropriate: allergies, current medications, past family history, past medical history, past social history, past surgical history and problem list.      Review of Systems  Constitutional: Negative for weight change, fevers, chills, night sweats, fatigue. ENT/Mouth: Negative for hearing changes, ear pain, nasal congestion, sinus pain, hoarseness, sore throat, rhinorrhea, swallowing difficulty. Eyes: Negative for pain, redness, discharge, vision changes. Cardiovascular: Negative for chest pain, SOB, palpitations. Respiratory: Negative for cough, sputum, wheezing, dyspnea. Gastrointestinal: Negative for nausea, vomiting, diarrhea, constipation, pain, heartburn. Genitourinary: Negative for dysuria, urinary frequency, hematuria. Musculoskeletal: As per HPI. Skin: Negative for skin rash, color changes. Neuro: Negative for weakness, numbness, tingling, loss of consciousness. Psych: Negative for anxiety, depression. Heme/Lymph: Negative for easy bruising, bleeding, lymphadenopathy.         Past Medical History:   Diagnosis Date    Abnormal mammogram     RESOLVED [id number]    Allergic     Seasonal    Arthritis     Colon polyp     Depression     GERD (gastroesophageal reflux disease)     diet managed, occasional    Hallux rigidus     LAST ASSESSED [id number] L grreat toe    Heart murmur From birth    Hyperlipidemia     Lyme disease     LAST ASSESSED [id number]    Murmur, cardiac     functional    Trigger finger     ,r hand ring finger and middle; L same fingers    Trigger middle finger of left hand 04/12/2022    Trigger ring finger of left hand 04/12/2022    Wears glasses        Past Surgical History:   Procedure Laterality Date    COLONOSCOPY      LAST ASSESSED [id number]    DILATION AND CURETTAGE OF UTERUS      ENDOMETRIAL ABLATION      ENDOMETRIAL CRYOABLATION      WITH ULTRASOUND GUIDE     IR EPIDURAL STEROID INJECTION      KNEE ARTHROSCOPY      b/l meniscus    GA ARTHRODESIS GREAT TOE METATARSOPHALANGEAL JOINT Left 1/18/2019    Procedure: GREAT TOE FUSION WITH PLATE;  Surgeon: Araceli Segura DPM;  Location: [facility];  Service: Podiatry    GA ARTHRODESIS GREAT TOE METATARSOPHALANGEAL JOINT Right 11/18/2022    Procedure: [place] / FUSION FOOT;  Surgeon: Araceli Segura DPM;  Location: [facility];  Service: Podiatry    GA TENDON SHEATH INCISION Left 4/29/2022    Procedure: RELEASE TRIGGER FINGER, LEFT MIDDLE  [place];  Surgeon: Daryle Drum, MD;  Location: [facility];  Service: Orthopedics    GA TENDON SHEATH INCISION Right 6/22/2022    Procedure: RELEASE TRIGGER FINGER, RIGHT MIDDLE AND [id number] [place] [place];  Surgeon: Daryle Drum, MD;  Location:  MAIN OR;  Service: Orthopedics    TONSILLECTOMY AND ADENOIDECTOMY         Social History     Socioeconomic History    Marital status: /Civil Union     Spouse name: Not on file    Number of children: 2    Years of education: Not on file    Highest education level: Not on file   Occupational History    Occupation: Office work     Comment: Part-time   Tobacco Use    Smoking status: Never    Smokeless tobacco: Never   Vaping Use    Vaping Use: Never used   Substance and Sexual Activity    Alcohol use: Yes     Comment: Very occasionally    Drug use: No    Sexual activity: Yes     Partners: Male     Birth control/protection: Male Sterilization Other Topics Concern    Not on file   Social History Narrative    EXERCISE REGULARLY      Social Determinants of Health     Financial Resource Strain: Low Risk  (8/31/2020)    Overall Financial Resource Strain (CARDIA)     Difficulty of Paying Living Expenses: Not hard at all   Food Insecurity: No Food Insecurity (8/31/2020)    Hunger Vital Sign     Worried About Running Out of Food in the Last Year: Never true     Ran Out of Food in the Last Year: Never true   Transportation Needs: No Transportation Needs (8/31/2020)    PRAPARE - Transportation     Lack of Transportation (Medical): No     Lack of Transportation (Non-Medical): No   Physical Activity: Sufficiently Active (10/25/2021)    Exercise Vital Sign     Days of Exercise per Week: 3 days     Minutes of Exercise per Session: 60 min   Recent Concern: Physical Activity - Insufficiently Active (10/11/2021)    Exercise Vital Sign     Days of Exercise per Week: 2 days     Minutes of Exercise per Session: 60 min   Stress: Stress Concern Present (10/25/2021)    [facility]     Feeling of Stress : Very much   Social Connections:  Moderately Integrated (8/31/2020)    Social Connection and Isolation Panel [NHANES]     Frequency of Communication with Friends and Family: More than three times a week     Frequency of Social Gatherings with Friends and Family: More than three times a week     Attends Yarsanism Services: 1 to 4 times per year     Active Member of [employer] or Organizations: No     Attends Club or Organization Meetings: Never     Marital Status:    Intimate Partner Violence: Not At Risk (10/26/2022)    Humiliation, Afraid, Rape, and Kick questionnaire     Fear of Current or Ex-Partner: No     Emotionally Abused: No     Physically Abused: No     Sexually Abused: No   Housing Stability: Not on file       Family History   Problem Relation Age of Onset    Heart disease Mother         Heart failure and valve replacement    Colon polyps Mother     Stroke Mother     COPD Mother     Hypertension Father     Kidney failure Father     Hyperlipidemia Father     Coronary artery disease Family     Hypertension Family     Lung cancer Sister 61    Asthma Daughter     Breast cancer Maternal Grandmother 36    Breast cancer Paternal Grandmother 80    Arthritis Paternal Grandmother     Breast cancer Maternal Aunt     No Known Problems Maternal Aunt     No Known Problems Maternal Aunt     No Known Problems Maternal Aunt     No Known Problems Paternal Aunt     No Known Problems Sister     No Known Problems Maternal Grandfather     Arthritis Paternal Grandfather     No Known Problems Son     Cancer Sister         Lung cancer with the ALK gene       No Known Allergies      Current Outpatient Medications:     atorvastatin (LIPITOR) 40 mg tablet, Take 1 tablet (40 mg total) by mouth daily at bedtime, Disp: 90 tablet, Rfl: 1    Calcium Carbonate-Vitamin D (CALCIUM 600+D PO), Take 1 tablet by mouth daily, Disp: , Rfl:     Humira Pen 40 MG/0.4ML PNKT, Inject 40 mg under the skin, Disp: , Rfl:     multivitamin (THERAGRAN) TABS, Take 1 tablet by mouth daily at bedtime  , Disp: , Rfl:       Objective:    Vitals:    12/12/23 1433   BP: 138/84   Weight: 87.1 kg (192 lb)   Height: 5' 3" (1.6 m)       Physical Exam  General: Well appearing, well nourished, in no acute distress. Oriented x 3, normal mood and affect. Ambulating without difficulty. Skin: Good turgor, no rash, unusual bruising or prominent lesions. Hair: Normal texture and distribution. Nails: No pits. HEENT:  Head: Normocephalic, atraumatic. Eyes: Conjunctiva clear, sclera non-icteric, EOM intact. Nose: No external lesions, mucosa non-inflamed. Mouth: Mucous membranes moist, no mucosal lesions.   Neck: Supple  Musculoskeletal:   + Dactylitis of right second digit of hand  + Crepitus bilateral knees  No tenderness to palpation or swelling of joints bilaterally to include: shoulder, elbow, wrist, MCP I-V, PIP I-V, ankle, MTP I-V. Neurologic: Alert and oriented. No focal neurological deficits appreciated. Psychiatric: Normal mood and affect.        Joanie Diallo PA-C  Rheumatology

## 2023-12-12 ENCOUNTER — TELEPHONE (OUTPATIENT)
Dept: RHEUMATOLOGY | Facility: CLINIC | Age: 63
End: 2023-12-12

## 2023-12-12 ENCOUNTER — OFFICE VISIT (OUTPATIENT)
Dept: RHEUMATOLOGY | Facility: CLINIC | Age: 63
End: 2023-12-12
Payer: COMMERCIAL

## 2023-12-12 VITALS
SYSTOLIC BLOOD PRESSURE: 138 MMHG | WEIGHT: 192 LBS | BODY MASS INDEX: 34.02 KG/M2 | DIASTOLIC BLOOD PRESSURE: 84 MMHG | HEIGHT: 63 IN

## 2023-12-12 DIAGNOSIS — M25.562 CHRONIC PAIN OF BOTH KNEES: ICD-10-CM

## 2023-12-12 DIAGNOSIS — G89.29 CHRONIC PAIN OF BOTH KNEES: ICD-10-CM

## 2023-12-12 DIAGNOSIS — Z79.899 HIGH RISK MEDICATION USE: ICD-10-CM

## 2023-12-12 DIAGNOSIS — D72.819 LEUKOPENIA, UNSPECIFIED TYPE: ICD-10-CM

## 2023-12-12 DIAGNOSIS — M25.561 CHRONIC PAIN OF BOTH KNEES: ICD-10-CM

## 2023-12-12 DIAGNOSIS — M47.819 SPONDYLOARTHRITIS: ICD-10-CM

## 2023-12-12 DIAGNOSIS — M47.819 PERIPHERAL SPONDYLOARTHRITIS: Primary | ICD-10-CM

## 2023-12-12 DIAGNOSIS — M17.11 PRIMARY OSTEOARTHRITIS OF RIGHT KNEE: ICD-10-CM

## 2023-12-12 PROCEDURE — 99214 OFFICE O/P EST MOD 30 MIN: CPT | Performed by: PHYSICIAN ASSISTANT

## 2023-12-12 RX ORDER — ADALIMUMAB 40MG/0.4ML
40 KIT SUBCUTANEOUS
COMMUNITY
Start: 2023-12-07 | End: 2023-12-21 | Stop reason: SDUPTHER

## 2023-12-12 NOTE — TELEPHONE ENCOUNTER
Please initiate prior authorization for Enbrel weekly injections  Diagnosis: spondyloarthritis with peripheral arthritis, psoriasis  Tried: Sulfasalazine, meloxicam, Humira  Cannot take NSAIDs due to renal function

## 2023-12-13 NOTE — TELEPHONE ENCOUNTER
Prior authorization paperwork for Enbrel was sent to patient's insurance for authorization.  Request # [id number]

## 2023-12-14 NOTE — TELEPHONE ENCOUNTER
Caller: [facility]    Doctor/Office: Pari Ashton    Call regarding :  clinical questions re: Coleman     Call was transferred to: Rheum clinical

## 2023-12-15 ENCOUNTER — TELEPHONE (OUTPATIENT)
Dept: RHEUMATOLOGY | Facility: CLINIC | Age: 63
End: 2023-12-15

## 2023-12-15 NOTE — TELEPHONE ENCOUNTER
Patient was called and a VM was left letting her know her Enbrel was approved by her insurance. She was asked to give us a call back once she has found out what her co-pays will be and we will send the prescription to her pharmacy.

## 2023-12-15 NOTE — TELEPHONE ENCOUNTER
Pls let the patient know it was approved so if she can check with her insurance company/[employer] to see about the copay before I proceed with sending the Rx.

## 2023-12-21 ENCOUNTER — TELEPHONE (OUTPATIENT)
Dept: RHEUMATOLOGY | Facility: CLINIC | Age: 63
End: 2023-12-21

## 2023-12-21 ENCOUNTER — TELEPHONE (OUTPATIENT)
Age: 63
End: 2023-12-21

## 2023-12-21 DIAGNOSIS — M47.819 SPONDYLOARTHRITIS: ICD-10-CM

## 2023-12-21 DIAGNOSIS — M47.819 PERIPHERAL SPONDYLOARTHRITIS: Primary | ICD-10-CM

## 2023-12-21 DIAGNOSIS — Z79.899 HIGH RISK MEDICATION USE: ICD-10-CM

## 2023-12-21 RX ORDER — ADALIMUMAB 40MG/0.4ML
40 KIT SUBCUTANEOUS
Qty: 2 EACH | Refills: 6 | Status: SHIPPED | OUTPATIENT
Start: 2023-12-21 | End: 2024-01-18

## 2023-12-21 NOTE — TELEPHONE ENCOUNTER
Caller: Patient    Doctor: Mary    Reason for call: Patient is on humara. And would like to continue to stay on this medication. Patient has a new discount RX card     Call back#: [phone]

## 2023-12-22 ENCOUNTER — OFFICE VISIT (OUTPATIENT)
Dept: OBGYN CLINIC | Facility: CLINIC | Age: 63
End: 2023-12-22
Payer: COMMERCIAL

## 2023-12-22 ENCOUNTER — APPOINTMENT (OUTPATIENT)
Dept: RADIOLOGY | Facility: CLINIC | Age: 63
End: 2023-12-22
Payer: COMMERCIAL

## 2023-12-22 VITALS
SYSTOLIC BLOOD PRESSURE: 153 MMHG | HEIGHT: 63 IN | HEART RATE: 73 BPM | DIASTOLIC BLOOD PRESSURE: 84 MMHG | BODY MASS INDEX: 34.02 KG/M2 | WEIGHT: 192 LBS

## 2023-12-22 DIAGNOSIS — M17.11 PRIMARY OSTEOARTHRITIS OF RIGHT KNEE: Primary | ICD-10-CM

## 2023-12-22 DIAGNOSIS — M17.12 PRIMARY OSTEOARTHRITIS OF LEFT KNEE: ICD-10-CM

## 2023-12-22 PROCEDURE — 99213 OFFICE O/P EST LOW 20 MIN: CPT | Performed by: STUDENT IN AN ORGANIZED HEALTH CARE EDUCATION/TRAINING PROGRAM

## 2023-12-22 PROCEDURE — 20610 DRAIN/INJ JOINT/BURSA W/O US: CPT | Performed by: STUDENT IN AN ORGANIZED HEALTH CARE EDUCATION/TRAINING PROGRAM

## 2023-12-22 PROCEDURE — 73564 X-RAY EXAM KNEE 4 OR MORE: CPT

## 2023-12-22 RX ADMIN — TRIAMCINOLONE ACETONIDE 40 MG: 40 INJECTION, SUSPENSION INTRA-ARTICULAR; INTRAMUSCULAR at 09:15

## 2023-12-22 RX ADMIN — LIDOCAINE HYDROCHLORIDE 4 ML: 10 INJECTION, SOLUTION INFILTRATION; PERINEURAL at 09:15

## 2023-12-22 NOTE — PROGRESS NOTES
Knee New Office Note    Assessment:     1. Primary osteoarthritis of right knee    2. Primary osteoarthritis of left knee        Plan:  63-year-old female with chronic bilateral knee pain secondary to severe osteoarthritis.  Patient has had symptomatic relief with previous right knee corticosteroid injection.  Given her reaction following the prior CSI, recommend spacing out bilateral knee corticosteroid injections-right knee was performed today as described below and left knee will be performed in 1 week.  We discussed treatment options for bilateral knee osteoarthritis which include conservative and surgical modalities.  Given that she has responded well to conservative management with over-the-counter anti-inflammatories and intra-articular corticosteroid injections, patient wishes to continue with her current treatment plan.  Patient should avoid strenuous activities for the next 1-2 days. Patient should avoid vaccines for the next 2 weeks if possible. Can apply cold compress for soreness. If patient feels relief with the cortisone injections, procedure can be repeated every 3 months. If patient sees minimal/no relief with cortisone injection, treatment with VISCO injections can be further discussed.         Problem List Items Addressed This Visit    None  Visit Diagnoses       Primary osteoarthritis of right knee    -  Primary    Primary osteoarthritis of left knee        Relevant Orders    XR knee 4+ vw left injury           Subjective:     Patient ID: Arcelia Rivas is a 63 y.o. female.  Chief Complaint:  HPI:  63 y.o. female presents for follow-up regarding chronic right knee pain secondary to known osteoarthritis.  Patient was last seen in clinic on 9/22/2023 and received a right knee intra-articular corticosteroid injection.  She reports significant symptomatic relief for approximately 2 months following the injection.  Over the last months she has noticed her prior medial sided right knee pain slowly  "returning.  She rates it a 8/10 in severity at its worst.  She also reports that her left knee has started to become painful over the last 3 months.  She reports that the pain is located over the medial aspect of the knee and is worse with activity and relieved by rest.  She has a history of left knee medial meniscus repair.  She rates her pain 5/10 in severity regarding her left knee.  She would like to receive bilateral knee corticosteroid injections today.  Of note she does report that the night after she received her right knee corticosteroid injection she had trouble falling asleep and felt \" jazzed up\".      Allergy:  No Known Allergies  Medications:  all current active meds have been reviewed  Past Medical History:  Past Medical History:   Diagnosis Date    Abnormal mammogram     RESOLVED [id number]    Allergic     Seasonal    Arthritis     Colon polyp     Depression     GERD (gastroesophageal reflux disease)     diet managed, occasional    Hallux rigidus     LAST ASSESSED [id number] L grreat toe    Heart murmur From birth    Hyperlipidemia     Lyme disease     LAST ASSESSED [id number]    Murmur, cardiac     functional    Trigger finger     ,r hand ring finger and middle; L same fingers    Trigger middle finger of left hand 04/12/2022    Trigger ring finger of left hand 04/12/2022    Wears glasses      Past Surgical History:  Past Surgical History:   Procedure Laterality Date    COLONOSCOPY      LAST ASSESSED [id number]    DILATION AND CURETTAGE OF UTERUS      ENDOMETRIAL ABLATION      ENDOMETRIAL CRYOABLATION      WITH ULTRASOUND GUIDE     IR EPIDURAL STEROID INJECTION      KNEE ARTHROSCOPY      b/l meniscus    DC ARTHRODESIS GREAT TOE METATARSOPHALANGEAL JOINT Left 1/18/2019    Procedure: GREAT TOE FUSION WITH PLATE;  Surgeon: Anthony Tristan DPM;  Location: [facility];  Service: Podiatry    DC ARTHRODESIS GREAT TOE METATARSOPHALANGEAL JOINT Right 11/18/2022    Procedure: ARTHRODESIS / FUSION FOOT;  Surgeon: " Anthony Tristan DPM;  Location: [facility];  Service: Podiatry    [place] TENDON SHEATH INCISION Left 4/29/2022    Procedure: RELEASE TRIGGER FINGER, LEFT MIDDLE AND RING;  Surgeon: Ida Ron MD;  Location: [facility];  Service: Orthopedics    [place] TENDON SHEATH INCISION Right 6/22/2022    Procedure: RELEASE TRIGGER FINGER, RIGHT MIDDLE AND RING FINGER;  Surgeon: Ida Ron MD;  Location: [facility];  Service: Orthopedics    TONSILLECTOMY AND ADENOIDECTOMY       Family History:  Family History   Problem Relation Age of Onset    Heart disease Mother         Heart failure and valve replacement    Colon polyps Mother     Stroke Mother     COPD Mother     Hypertension Father     Kidney failure Father     Hyperlipidemia Father     Coronary artery disease Family     Hypertension Family     Lung cancer Sister 60    Asthma Daughter     Breast cancer Maternal Grandmother 40    Breast cancer Paternal Grandmother 80    Arthritis Paternal Grandmother     Breast cancer Maternal Aunt     No Known Problems Maternal Aunt     No Known Problems Maternal Aunt     No Known Problems Maternal Aunt     No Known Problems Paternal Aunt     No Known Problems Sister     No Known Problems Maternal Grandfather     Arthritis Paternal Grandfather     No Known Problems Son     Cancer Sister         Lung cancer with the ALK gene     Social History:  Social History     Substance and Sexual Activity   Alcohol Use Yes    Comment: Very occasionally     Social History     Substance and Sexual Activity   Drug Use No     Social History     Tobacco Use   Smoking Status Never   Smokeless Tobacco Never           ROS:  General: Per HPI  Skin: Negative, except if noted below  HEENT: Negative  Respiratory: Negative  Cardiovascular: Negative  Gastrointestinal: Negative  Urinary: Negative  Vascular: Negative  Musculoskeletal: Positive per HPI   Neurologic: Positive per HPI  Endocrine: Negative    Objective:  BP Readings from Last 1 Encounters:   12/22/23 153/84  "     Wt Readings from Last 1 Encounters:   12/22/23 87.1 kg (192 lb)        Respiratory:   non-labored respirations    Lymphatics:  no palpable lymph nodes    Gait:   Antalgic     Neurologic:   Alert and oriented times 3  Patient with normal sensation except as noted below  Deep tendon reflexes 2+ except as noted in MSK exam    Bilateral Lower Extremity:  Left Knee:      Inspection: Well-healed arthroscopic incisions    Overall limb alignment varus    Effusion: Negative    ROM full without pain    Extensor Lag: Negative    Palpation: Medial joint line tenderness to palpation    AP Stability at 90 deg stable    M/L stability in full extension stable    M/L stability in midflexion stable    Motor: 5/5 Q/HS/TA/GS/P    Pulses: 2+ DP / 2+ PT    SILT DP/SP/S/S/TN    Right knee:     Inspection: Skin intact    Overall limb alignment varus    Effusion: Negative    ROM full without pain    Extensor Lag: Negative    Palpation: Medial joint line tenderness to palpation    AP Stability at 90 deg stable    M/L stability in full extension stable    M/L stability in midflexion stable    Motor: 5/5 Q/HS/TA/GS/P    Pulses: 2+ DP / 2+ PT    SILT DP/SP/S/S/TN    Imaging:  My interpretation XR AP scanogram/AP bilateral knee/lateral/erwin/sunrise left knee: Severe medial compartment joint space narrowing, subchondral sclerosis, subchondral cysts, osteophyte formation. No fracture or dislocation.     BMI:   Estimated body mass index is 34.01 kg/m² as calculated from the following:    Height as of this encounter: 5' 3\" (1.6 m).    Weight as of this encounter: 87.1 kg (192 lb).  BSA:   Estimated body surface area is 1.9 meters squared as calculated from the following:    Height as of this encounter: 5' 3\" (1.6 m).    Weight as of this encounter: 87.1 kg (192 lb).         Large joint arthrocentesis: bilateral knee  Universal Protocol:  Consent: Verbal consent obtained.  Risks and benefits: risks, benefits and alternatives were " discussed  Consent given by: patient  Patient identity confirmed: verbally with patient  Supporting Documentation  Indications: pain   Procedure Details  Location: knee - bilateral knee  Preparation: Patient was prepped and draped in the usual sterile fashion  Needle size: 22 G  Ultrasound guidance: no  Approach: anterolateral    Medications (Right): 4 mL lidocaine 1 %; 40 mg triamcinolone acetonide 40 mg/mLMedications (Left): 4 mL lidocaine 1 %; 40 mg triamcinolone acetonide 40 mg/mL   Patient tolerance: patient tolerated the procedure well with no immediate complications  Dressing:  Sterile dressing applied

## 2023-12-23 RX ORDER — LIDOCAINE HYDROCHLORIDE 10 MG/ML
4 INJECTION, SOLUTION INFILTRATION; PERINEURAL
Status: COMPLETED | OUTPATIENT
Start: 2023-12-22 | End: 2023-12-22

## 2023-12-23 RX ORDER — TRIAMCINOLONE ACETONIDE 40 MG/ML
40 INJECTION, SUSPENSION INTRA-ARTICULAR; INTRAMUSCULAR
Status: COMPLETED | OUTPATIENT
Start: 2023-12-22 | End: 2023-12-22

## 2023-12-29 ENCOUNTER — OFFICE VISIT (OUTPATIENT)
Dept: OBGYN CLINIC | Facility: CLINIC | Age: 63
End: 2023-12-29
Payer: COMMERCIAL

## 2023-12-29 VITALS
WEIGHT: 192 LBS | DIASTOLIC BLOOD PRESSURE: 81 MMHG | HEIGHT: 63 IN | BODY MASS INDEX: 34.02 KG/M2 | HEART RATE: 69 BPM | SYSTOLIC BLOOD PRESSURE: 136 MMHG

## 2023-12-29 DIAGNOSIS — M17.12 PRIMARY OSTEOARTHRITIS OF LEFT KNEE: Primary | ICD-10-CM

## 2023-12-29 PROCEDURE — 99212 OFFICE O/P EST SF 10 MIN: CPT | Performed by: PHYSICIAN ASSISTANT

## 2023-12-29 PROCEDURE — 20610 DRAIN/INJ JOINT/BURSA W/O US: CPT | Performed by: PHYSICIAN ASSISTANT

## 2023-12-29 RX ORDER — LIDOCAINE HYDROCHLORIDE 10 MG/ML
4 INJECTION, SOLUTION INFILTRATION; PERINEURAL
Status: COMPLETED | OUTPATIENT
Start: 2023-12-29 | End: 2023-12-29

## 2023-12-29 RX ORDER — TRIAMCINOLONE ACETONIDE 40 MG/ML
40 INJECTION, SUSPENSION INTRA-ARTICULAR; INTRAMUSCULAR
Status: COMPLETED | OUTPATIENT
Start: 2023-12-29 | End: 2023-12-29

## 2023-12-29 RX ADMIN — TRIAMCINOLONE ACETONIDE 40 MG: 40 INJECTION, SUSPENSION INTRA-ARTICULAR; INTRAMUSCULAR at 13:15

## 2023-12-29 RX ADMIN — LIDOCAINE HYDROCHLORIDE 4 ML: 10 INJECTION, SOLUTION INFILTRATION; PERINEURAL at 13:15

## 2023-12-29 NOTE — PROGRESS NOTES
Knee Follow up Office Note    Assessment:     1. Primary osteoarthritis of left knee          Plan:  63-year-old female with chronic bilateral knee pain secondary to severe osteoarthritis.  Patient has had symptomatic relief with previous right knee corticosteroid injection.  Left knee CSI performed into the left knee today. Patient should avoid strenuous activities for the next 1-2 days. Patient should avoid vaccines for the next 2 weeks if possible. Can apply cold compress for soreness. If patient feels relief with the cortisone injections, procedure can be repeated every 3 months. If patient sees minimal/no relief with cortisone injection, treatment with VISCO injections can be further discussed.         Problem List Items Addressed This Visit    None  Visit Diagnoses       Primary osteoarthritis of left knee    -  Primary             Subjective:     Patient ID: Arcelia Rivas is a 63 y.o. female.  Chief Complaint:  HPI:  63 y.o. female presents for follow-up regarding chronic right knee pain secondary to known osteoarthritis.  She has been treating her knees with intermittent cortisone injections.  Patient was last seen in clinic last week and received a right knee intra-articular corticosteroid injection.  She is here today for a follow up of her left knee so that she can get an intra-articular cortisone injection.  She previously had a reaction to getting both knees injected, so we have spaced out her injections 1 week apart.      Allergy:  No Known Allergies  Medications:  all current active meds have been reviewed  Past Medical History:  Past Medical History:   Diagnosis Date    Abnormal mammogram     RESOLVED 15AUG2017    Allergic     Seasonal    Arthritis     Colon polyp     Depression     GERD (gastroesophageal reflux disease)     diet managed, occasional    Hallux rigidus     LAST ASSESSED [id number] L grreat toe    Heart murmur From birth    Hyperlipidemia     Lyme disease     LAST ASSESSED [id number]     Murmur, cardiac     functional    Trigger finger     ,r hand ring finger and middle; L same fingers    Trigger middle finger of left hand 04/12/2022    Trigger ring finger of left hand 04/12/2022    Wears glasses      Past Surgical History:  Past Surgical History:   Procedure Laterality Date    COLONOSCOPY      LAST ASSESSED [id number]    DILATION AND CURETTAGE OF UTERUS      ENDOMETRIAL ABLATION      ENDOMETRIAL CRYOABLATION      WITH ULTRASOUND GUIDE     IR EPIDURAL STEROID INJECTION      KNEE ARTHROSCOPY      b/l meniscus    MI ARTHRODESIS GREAT TOE METATARSOPHALANGEAL JOINT Left 1/18/2019    Procedure: GREAT TOE FUSION WITH PLATE;  Surgeon: Anthony Tristan DPM;  Location: [facility];  Service: Podiatry    MI ARTHRODESIS GREAT TOE METATARSOPHALANGEAL JOINT Right 11/18/2022    Procedure: ARTHRODESIS / FUSION FOOT;  Surgeon: Anthony Tristan DPM;  Location: [facility];  Service: Podiatry    MI TENDON SHEATH INCISION Left 4/29/2022    Procedure: RELEASE TRIGGER FINGER, LEFT MIDDLE AND RING;  Surgeon: Ida Ron MD;  Location: [facility];  Service: Orthopedics    MI TENDON SHEATH INCISION Right 6/22/2022    Procedure: RELEASE TRIGGER FINGER, RIGHT MIDDLE AND RING FINGER;  Surgeon: Ida Ron MD;  Location: [facility];  Service: Orthopedics    TONSILLECTOMY AND ADENOIDECTOMY       Family History:  Family History   Problem Relation Age of Onset    Heart disease Mother         Heart failure and valve replacement    Colon polyps Mother     Stroke Mother     COPD Mother     Hypertension Father     Kidney failure Father     Hyperlipidemia Father     Coronary artery disease Family     Hypertension Family     Lung cancer Sister 60    Asthma Daughter     Breast cancer Maternal Grandmother 40    Breast cancer Paternal Grandmother 80    Arthritis Paternal Grandmother     Breast cancer Maternal Aunt     No Known Problems Maternal Aunt     No Known Problems Maternal Aunt     No Known Problems Maternal Aunt     No Known  "Problems Paternal Aunt     No Known Problems Sister     No Known Problems Maternal Grandfather     Arthritis Paternal Grandfather     No Known Problems Son     Cancer Sister         Lung cancer with the ALK gene     Social History:  Social History     Substance and Sexual Activity   Alcohol Use Yes    Comment: Very occasionally     Social History     Substance and Sexual Activity   Drug Use No     Social History     Tobacco Use   Smoking Status Never   Smokeless Tobacco Never           ROS:  General: Per HPI  Skin: Negative, except if noted below  HEENT: Negative  Respiratory: Negative  Cardiovascular: Negative  Gastrointestinal: Negative  Urinary: Negative  Vascular: Negative  Musculoskeletal: Positive per HPI   Neurologic: Positive per HPI  Endocrine: Negative    Objective:  BP Readings from Last 1 Encounters:   12/29/23 136/81      Wt Readings from Last 1 Encounters:   12/29/23 87.1 kg (192 lb)        Respiratory:   non-labored respirations    Lymphatics:  no palpable lymph nodes    Gait:   Antalgic     Neurologic:   Alert and oriented times 3  Patient with normal sensation except as noted below  Deep tendon reflexes 2+ except as noted in MSK exam    Bilateral Lower Extremity:  Left Knee:      Inspection: Well-healed arthroscopic incisions    Overall limb alignment varus    Effusion: Negative    ROM full without pain    Extensor Lag: Negative    Palpation: Medial joint line tenderness to palpation    AP Stability at 90 deg stable    M/L stability in full extension stable    M/L stability in midflexion stable    Motor: 5/5 Q/HS/TA/GS/P    Pulses: 2+ DP / 2+ PT    SILT DP/SP/S/S/TN      Imaging:  No new imaging today    BMI:   Estimated body mass index is 34.01 kg/m² as calculated from the following:    Height as of this encounter: 5' 3\" (1.6 m).    Weight as of this encounter: 87.1 kg (192 lb).  BSA:   Estimated body surface area is 1.9 meters squared as calculated from the following:    Height as of this " "encounter: 5' 3\" (1.6 m).    Weight as of this encounter: 87.1 kg (192 lb).         Large joint arthrocentesis: L knee  Universal Protocol:  Consent: Verbal consent obtained.  Risks and benefits: risks, benefits and alternatives were discussed  Consent given by: patient  Patient understanding: patient states understanding of the procedure being performed  Supporting Documentation  Indications: pain   Procedure Details  Location: knee - L knee  Preparation: Patient was prepped and draped in the usual sterile fashion  Needle size: 22 G  Approach: anterolateral  Medications administered: 40 mg triamcinolone acetonide 40 mg/mL; 4 mL lidocaine 1 %    Patient tolerance: patient tolerated the procedure well with no immediate complications  Dressing:  Sterile dressing applied            Margret Cunha PA-C      "

## 2024-01-03 ENCOUNTER — TELEPHONE (OUTPATIENT)
Dept: RHEUMATOLOGY | Facility: CLINIC | Age: 64
End: 2024-01-03

## 2024-01-03 ENCOUNTER — TELEPHONE (OUTPATIENT)
Age: 64
End: 2024-01-03

## 2024-01-03 NOTE — TELEPHONE ENCOUNTER
Mary Pisano is now getting over a head cold day 4. She does take humira and want to make sure she can still take it and o=would be okay.       Callback: [phone] pls leave message     Will also be getting blood work done tomorrow

## 2024-01-03 NOTE — TELEPHONE ENCOUNTER
Patient was called and told to hold her Humira until her cold symptoms subside. Patient verbalized understanding.

## 2024-01-05 ENCOUNTER — TELEPHONE (OUTPATIENT)
Dept: RHEUMATOLOGY | Facility: CLINIC | Age: 64
End: 2024-01-05

## 2024-01-10 DIAGNOSIS — E78.5 HYPERLIPIDEMIA, UNSPECIFIED HYPERLIPIDEMIA TYPE: ICD-10-CM

## 2024-01-10 RX ORDER — ATORVASTATIN CALCIUM 40 MG/1
40 TABLET, FILM COATED ORAL
Qty: 90 TABLET | Refills: 0 | Status: SHIPPED | OUTPATIENT
Start: 2024-01-10

## 2024-03-22 ENCOUNTER — OFFICE VISIT (OUTPATIENT)
Dept: OBGYN CLINIC | Facility: CLINIC | Age: 64
End: 2024-03-22
Payer: COMMERCIAL

## 2024-03-22 VITALS
HEART RATE: 66 BPM | WEIGHT: 192 LBS | SYSTOLIC BLOOD PRESSURE: 143 MMHG | HEIGHT: 63 IN | BODY MASS INDEX: 34.02 KG/M2 | DIASTOLIC BLOOD PRESSURE: 87 MMHG

## 2024-03-22 DIAGNOSIS — M17.12 PRIMARY OSTEOARTHRITIS OF LEFT KNEE: ICD-10-CM

## 2024-03-22 DIAGNOSIS — M17.11 PRIMARY OSTEOARTHRITIS OF RIGHT KNEE: Primary | ICD-10-CM

## 2024-03-22 PROCEDURE — 99215 OFFICE O/P EST HI 40 MIN: CPT | Performed by: STUDENT IN AN ORGANIZED HEALTH CARE EDUCATION/TRAINING PROGRAM

## 2024-03-22 PROCEDURE — 20610 DRAIN/INJ JOINT/BURSA W/O US: CPT | Performed by: STUDENT IN AN ORGANIZED HEALTH CARE EDUCATION/TRAINING PROGRAM

## 2024-03-22 RX ORDER — CHLORHEXIDINE GLUCONATE 4 G/100ML
SOLUTION TOPICAL DAILY PRN
OUTPATIENT
Start: 2024-03-22

## 2024-03-22 RX ORDER — LIDOCAINE HYDROCHLORIDE 10 MG/ML
4 INJECTION, SOLUTION INFILTRATION; PERINEURAL
Status: COMPLETED | OUTPATIENT
Start: 2024-03-22 | End: 2024-03-22

## 2024-03-22 RX ORDER — CHLORHEXIDINE GLUCONATE ORAL RINSE 1.2 MG/ML
15 SOLUTION DENTAL ONCE
OUTPATIENT
Start: 2024-03-22 | End: 2024-03-22

## 2024-03-22 RX ORDER — TRIAMCINOLONE ACETONIDE 40 MG/ML
40 INJECTION, SUSPENSION INTRA-ARTICULAR; INTRAMUSCULAR
Status: COMPLETED | OUTPATIENT
Start: 2024-03-22 | End: 2024-03-22

## 2024-03-22 RX ORDER — MULTIVIT-MIN/IRON FUM/FOLIC AC 7.5 MG-4
1 TABLET ORAL DAILY
Qty: 30 TABLET | Refills: 1 | Status: SHIPPED | OUTPATIENT
Start: 2024-03-22

## 2024-03-22 RX ORDER — MELATONIN
2000 DAILY
Qty: 60 TABLET | Refills: 1 | Status: SHIPPED | OUTPATIENT
Start: 2024-03-22

## 2024-03-22 RX ORDER — SODIUM CHLORIDE, SODIUM LACTATE, POTASSIUM CHLORIDE, CALCIUM CHLORIDE 600; 310; 30; 20 MG/100ML; MG/100ML; MG/100ML; MG/100ML
125 INJECTION, SOLUTION INTRAVENOUS CONTINUOUS
OUTPATIENT
Start: 2024-03-22

## 2024-03-22 RX ORDER — ASCORBIC ACID 500 MG
500 TABLET ORAL 2 TIMES DAILY
Qty: 60 TABLET | Refills: 1 | Status: SHIPPED | OUTPATIENT
Start: 2024-03-22

## 2024-03-22 RX ORDER — ACETAMINOPHEN 325 MG/1
975 TABLET ORAL ONCE
OUTPATIENT
Start: 2024-03-22 | End: 2024-03-22

## 2024-03-22 RX ORDER — FOLIC ACID 1 MG/1
1 TABLET ORAL DAILY
Qty: 30 TABLET | Refills: 1 | Status: SHIPPED | OUTPATIENT
Start: 2024-03-22

## 2024-03-22 RX ADMIN — TRIAMCINOLONE ACETONIDE 40 MG: 40 INJECTION, SUSPENSION INTRA-ARTICULAR; INTRAMUSCULAR at 10:45

## 2024-03-22 RX ADMIN — LIDOCAINE HYDROCHLORIDE 4 ML: 10 INJECTION, SOLUTION INFILTRATION; PERINEURAL at 10:45

## 2024-03-22 NOTE — PROGRESS NOTES
Knee New Office Note    Assessment:     1. Primary osteoarthritis of right knee    2. Primary osteoarthritis of left knee        Plan:     Problem List Items Addressed This Visit    None  Visit Diagnoses       Primary osteoarthritis of right knee    -  Primary    Primary osteoarthritis of left knee               Findings today are consistent with bilateral knee osteoarthritis. Imaging and prognosis was reviewed with the patient today. Discussed treatment options including continued observation, low impact exercises, bracing, anti-inflammatories, physical therapy, cortisone injection, visco injection, versus surgical intervention. Cortisone steroid injection was administered to bilateral knees today. Patient should avoid strenuous activities for the next 1-2 days. Patient should avoid vaccines for the next 2 weeks if possible. Can apply cold compress for soreness. She has failed extensive non-operative management with now waning success. She would like to plan right total knee approximately 3 months from now.     The patient has elected to proceed with right TKA. Risks and benefits of surgery to include but not limited to bleeding, infection, damage to surrounding structures, hardware failure, instability, fracture, dislocation, need for further surgery, continued pain, stiffness, blood clots, stroke, and heart attack was discussed with the patient. Informed consent was signed today in the office. The patient has met with our surgical schedulers and our preoperative joint replacement pathway has been initiated. All questions were answered. Patient will follow-up 2 weeks post operatively. BMI is appropriate at 34.01 and is a nonsmoker.      Subjective:     Patient ID: Arcelia Rivas is a 63 y.o. female.  Chief Complaint:  HPI:  63 y.o. female presents to the office for follow up of bilateral knee osteoarthritis. At last visit she received staged bilateral knee cortisone injections. Her pain has gradually returned over  the past few weeks, right worse than left. She is experiencing medial knee pain made worse with activities. She is interested in repeating cortisone injections to bilateral knees and discussing further treatment options.     Allergy:  No Known Allergies  Medications:  all current active meds have been reviewed  Past Medical History:  Past Medical History:   Diagnosis Date    Abnormal mammogram     RESOLVED 15AUG2017    Allergic     Seasonal    Arthritis     Colon polyp     Depression     GERD (gastroesophageal reflux disease)     diet managed, occasional    Hallux rigidus     LAST ASSESSED [id number] L grreat toe    Heart murmur From birth    Hyperlipidemia     Lyme disease     LAST ASSESSED [id number]    Murmur, cardiac     functional    Trigger finger     ,r hand ring finger and middle; L same fingers    Trigger middle finger of left hand 04/12/2022    Trigger ring finger of left hand 04/12/2022    Wears glasses      Past Surgical History:  Past Surgical History:   Procedure Laterality Date    COLONOSCOPY      LAST ASSESSED [id number]    DILATION AND CURETTAGE OF UTERUS      ENDOMETRIAL ABLATION      ENDOMETRIAL CRYOABLATION      WITH ULTRASOUND GUIDE     IR EPIDURAL STEROID INJECTION      KNEE ARTHROSCOPY      b/l meniscus    DE ARTHRODESIS GREAT TOE METATARSOPHALANGEAL JOINT Left 1/18/2019    Procedure: GREAT TOE FUSION WITH PLATE;  Surgeon: Anthony Tristan DPM;  Location: [facility];  Service: Podiatry    DE ARTHRODESIS GREAT TOE METATARSOPHALANGEAL JOINT Right 11/18/2022    Procedure: ARTHRODESIS / FUSION FOOT;  Surgeon: Anthony Tristan DPM;  Location:  [facility];  Service: Podiatry    DE TENDON SHEATH INCISION Left 4/29/2022    Procedure: RELEASE TRIGGER FINGER, LEFT MIDDLE AND RING;  Surgeon: Ida Ron MD;  Location:  [facility];  Service: Orthopedics    DE TENDON SHEATH INCISION Right 6/22/2022    Procedure: RELEASE TRIGGER FINGER, RIGHT MIDDLE AND RING FINGER;  Surgeon: Ida Ron MD;  Location:   [facility];  Service: Orthopedics    TONSILLECTOMY AND ADENOIDECTOMY       Family History:  Family History   Problem Relation Age of Onset    Heart disease Mother         Heart failure and valve replacement    Colon polyps Mother     Stroke Mother     COPD Mother     Hypertension Father     Kidney failure Father     Hyperlipidemia Father     Coronary artery disease Family     Hypertension Family     Lung cancer Sister 60    Asthma Daughter     Breast cancer Maternal Grandmother 40    Breast cancer Paternal Grandmother 80    Arthritis Paternal Grandmother     Breast cancer Maternal Aunt     No Known Problems Maternal Aunt     No Known Problems Maternal Aunt     No Known Problems Maternal Aunt     No Known Problems Paternal Aunt     No Known Problems Sister     No Known Problems Maternal Grandfather     Arthritis Paternal Grandfather     No Known Problems Son     Cancer Sister         Lung cancer with the ALK gene     Social History:  Social History     Substance and Sexual Activity   Alcohol Use Yes    Comment: Very occasionally     Social History     Substance and Sexual Activity   Drug Use No     Social History     Tobacco Use   Smoking Status Never   Smokeless Tobacco Never         ROS:  General: Per HPI  Skin: Negative, except if noted below  HEENT: Negative  Respiratory: Negative  Cardiovascular: Negative  Gastrointestinal: Negative  Urinary: Negative  Vascular: Negative  Musculoskeletal: Positive per HPI   Neurologic: Positive per HPI  Endocrine: Negative    Objective:  BP Readings from Last 1 Encounters:   03/22/24 143/87      Wt Readings from Last 1 Encounters:   03/22/24 87.1 kg (192 lb)        Respiratory:   non-labored respirations    Lymphatics:  no palpable lymph nodes    Gait:   Antalgic      Neurologic:   Alert and oriented times 3  Patient with normal sensation except as noted below  Deep tendon reflexes 2+ except as noted in MSK exam     Bilateral Lower Extremity:  Left Knee:      Inspection:  "Well-healed arthroscopic incisions    Overall limb alignment varus    Effusion: Negative    ROM  5-115    Extensor Lag: Negative    Palpation: Medial joint line tenderness to palpation    AP Stability at 90 deg stable    M/L stability in full extension stable    M/L stability in midflexion stable    Motor: 5/5 Q/HS/TA/GS/P    Pulses: 2+ DP / 2+ PT    SILT DP/SP/S/S/TN     Right knee:     Inspection: Skin intact    Overall limb alignment varus    Effusion: Negative    ROM 5-115    Extensor Lag: Negative    Palpation: Medial joint line tenderness to palpation    AP Stability at 90 deg stable    M/L stability in full extension stable    M/L stability in midflexion stable    Motor: 5/5 Q/HS/TA/GS/P    Pulses: 2+ DP / 2+ PT    SILT DP/SP/S/S/TN    Imaging:  No new imaging obtained today    BMI:   Estimated body mass index is 34.01 kg/m² as calculated from the following:    Height as of this encounter: 5' 3\" (1.6 m).    Weight as of this encounter: 87.1 kg (192 lb).  BSA:   Estimated body surface area is 1.9 meters squared as calculated from the following:    Height as of this encounter: 5' 3\" (1.6 m).    Weight as of this encounter: 87.1 kg (192 lb).         Large joint arthrocentesis: bilateral knee  Universal Protocol:  Consent: Verbal consent obtained.  Risks and benefits: risks, benefits and alternatives were discussed  Consent given by: patient  Time out: Immediately prior to procedure a \"time out\" was called to verify the correct patient, procedure, equipment, support staff and site/side marked as required.  Timeout called at: 3/22/2024 11:17 AM.  Patient understanding: patient states understanding of the procedure being performed  Site marked: the operative site was marked  Required items: required blood products, implants, devices, and special equipment available  Patient identity confirmed: verbally with patient  Supporting Documentation  Indications: pain   Procedure Details  Location: knee - bilateral " knee  Preparation: Patient was prepped and draped in the usual sterile fashion  Needle size: 22 G  Ultrasound guidance: no  Approach: anterolateral    Medications (Right): 4 mL lidocaine 1 %; 40 mg triamcinolone acetonide 40 mg/mLMedications (Left): 4 mL lidocaine 1 %; 40 mg triamcinolone acetonide 40 mg/mL   Patient tolerance: patient tolerated the procedure well with no immediate complications  Dressing:  Sterile dressing applied          Scribe Attestation      I,:  Latasha Moseley am acting as a scribe while in the presence of the attending physician.:       I,:  Tripp Szymanski DO personally performed the services described in this documentation    as scribed in my presence.:

## 2024-04-15 ENCOUNTER — OFFICE VISIT (OUTPATIENT)
Dept: RHEUMATOLOGY | Facility: CLINIC | Age: 64
End: 2024-04-15
Payer: COMMERCIAL

## 2024-04-15 VITALS
WEIGHT: 194 LBS | HEART RATE: 72 BPM | HEIGHT: 63 IN | DIASTOLIC BLOOD PRESSURE: 80 MMHG | SYSTOLIC BLOOD PRESSURE: 126 MMHG | BODY MASS INDEX: 34.38 KG/M2 | TEMPERATURE: 97.2 F | OXYGEN SATURATION: 96 %

## 2024-04-15 DIAGNOSIS — R29.890 LOSS OF HEIGHT: ICD-10-CM

## 2024-04-15 DIAGNOSIS — Z79.899 HIGH RISK MEDICATION USE: ICD-10-CM

## 2024-04-15 DIAGNOSIS — Z13.820 ENCOUNTER FOR OSTEOPOROSIS SCREENING IN ASYMPTOMATIC POSTMENOPAUSAL PATIENT: ICD-10-CM

## 2024-04-15 DIAGNOSIS — Z78.0 POST-MENOPAUSAL: ICD-10-CM

## 2024-04-15 DIAGNOSIS — M17.11 PRIMARY OSTEOARTHRITIS OF RIGHT KNEE: ICD-10-CM

## 2024-04-15 DIAGNOSIS — M47.819 PERIPHERAL SPONDYLOARTHRITIS: Primary | ICD-10-CM

## 2024-04-15 DIAGNOSIS — Z78.0 ENCOUNTER FOR OSTEOPOROSIS SCREENING IN ASYMPTOMATIC POSTMENOPAUSAL PATIENT: ICD-10-CM

## 2024-04-15 PROCEDURE — 99214 OFFICE O/P EST MOD 30 MIN: CPT | Performed by: PHYSICIAN ASSISTANT

## 2024-04-15 RX ORDER — ADALIMUMAB 40MG/0.4ML
KIT SUBCUTANEOUS
COMMUNITY
Start: 2023-09-27 | End: 2024-04-15 | Stop reason: SDUPTHER

## 2024-04-15 RX ORDER — ADALIMUMAB 40MG/0.4ML
40 KIT SUBCUTANEOUS
Qty: 2 EACH | Refills: 11 | Status: SHIPPED | OUTPATIENT
Start: 2024-04-15 | End: 2024-05-15

## 2024-04-15 NOTE — PROGRESS NOTES
Assessment and Plan:   Ms. Rivas is a 64-year-old female who presents for rheumatology follow-up of spondyloarthritis with peripheral arthritis.      Arcelia has experienced an improvement in her inflammatory joint pain the addition of Humira.  She will continue this unchanged and update labs in a few months prior to her right TKA.  She will hold the dose of Humira 2 weeks prior and 2 weeks following the surgery as directed.  She experiences a flare during that time, she will notify me.    Additionally, she has risk factors for development of osteoporosis including postmenopausal female, personal history of inflammatory arthritis, and has noticed approximately a 1 inch loss of height.  No history of fractures.  I have ordered baseline DEXA scan to be completed at her convenience.    We will plan for a follow-up in 5 months in the postoperative.    Plan:  Diagnoses and all orders for this visit:    Peripheral spondyloarthritis  -     DXA bone density spine hip and pelvis  -     Adalimumab (Humira, 2 Pen,) 40 MG/0.4ML PNKT; Inject 40 mg under the skin every 14 (fourteen) days    Post-menopausal  -     DXA bone density spine hip and pelvis    Encounter for osteoporosis screening in asymptomatic postmenopausal patient  -     DXA bone density spine hip and pelvis    High risk medication use  -     Adalimumab (Humira, 2 Pen,) 40 MG/0.4ML PNKT; Inject 40 mg under the skin every 14 (fourteen) days    Primary osteoarthritis of right knee    Loss of height  -     DXA bone density spine hip and pelvis    Other orders  -     Discontinue: Humira, 2 Pen, 40 MG/0.4ML PNKT        I have personally reviewed prior notes, recent laboratory results, and pertinent films in PACS.     Activities as tolerated.   Exercise: try to maintain a low impact exercise regimen as much as possible. Walk for 30 minutes a day for at least 3 days a week.   Continue other medications as prescribed by PCP and other specialists.       RTC in 5  months    Rheumatic Disease Summary:  1.  Spondyloarthritis with peripheral arthritis, psoriasis  -Initial visit 3/29/22:  Here for initial eval.  She has had mult.trigger fingers.  She has joint pain, swelling and stiffness.  +inflammatory spinal pain.  No psoriasis or inflammatory eye disease.   Likely spondyloarthritis with peripheral arthritis--check labs. Xrays of hands/SI joints. Continue PT. Standing NSAIDs x 4 weeks. Begin SSA 500mg BID.  -Visit 8/2/22: Still with joint pain and morning stiffness.  Also with LBP and stiffness.  Taking SSA 500mg BID with improvement in her symptoms.  S/p trigger finger release b/l 3rd/4th digit flexor tendons.  Most recent labs unavailable for review.  Xray of SI joints reviewed and no evidence of sacroiliitis.  Increase SSA to 1000 BID. Meloxicam PRN  -Visit 12/6/2022: S/p joint fusion right foot 11/18/22.  Scheduled for podiatric f/u this week.  Intermittent DIP pain with weather changes.  No side effects on SSA. Mild increased ALT. Cont SSA 1000mg BID.  -Visit 7/10/2023: persistently low WBC ~2.9. Monitor closely. No IA on exam. Cont SSZ 1000mg BID and meloxicam 15mg daily PRN.  -Visit 9/12/2023: D/c SSZ due to leukopenia (WBC 2.7). D/c meloxicam due to eGFR~60. Repeat CBC/BMP and get PA for Humira.  -Visit 12/12/2023: Starting to notice some improvement on Humira, but co-pay now going to be high.  Get PA for Enbrel  2.  OA  -Right knee severe OA- received CSI by Ortho 9/22/2023  3.  Other comorbidities: Trigger finger, GERD, lumbar radiculopathy       HPI  Ms. Rivas is a 64-year-old female who presents for rheumatology follow-up of spondyloarthritis with peripheral arthritis.      Notes a great improvement in her inflammatory joint pain since the addition of Humira.  No prolonged morning stiffness or swollen joints.  She recently had a steroid injection in her left knee which is helping and is planning for replacement of the right knee soon due to severe arthritis.  Also  taking calcium and vitamin D    The following portions of the patient's history were reviewed and updated as appropriate: allergies, current medications, past family history, past medical history, past social history, past surgical history and problem list.      Review of Systems  Constitutional: Negative for weight change, fevers, chills, night sweats, fatigue.  ENT/Mouth: Negative for hearing changes, ear pain, nasal congestion, sinus pain, hoarseness, sore throat, rhinorrhea, swallowing difficulty.   Eyes: Negative for pain, redness, discharge, vision changes.   Cardiovascular: Negative for chest pain, SOB, palpitations.   Respiratory: Negative for cough, sputum, wheezing, dyspnea.   Gastrointestinal: Negative for nausea, vomiting, diarrhea, constipation, pain, heartburn.  Genitourinary: Negative for dysuria, urinary frequency, hematuria.   Musculoskeletal: As per HPI.  Skin: Negative for skin rash, color changes.   Neuro: Negative for weakness, numbness, tingling, loss of consciousness.   Psych: Negative for anxiety, depression.   Heme/Lymph: Negative for easy bruising, bleeding, lymphadenopathy.        Past Medical History:   Diagnosis Date    Abnormal mammogram     RESOLVED 15AUG2017    Allergic     Seasonal    Arthritis     Colon polyp     Depression     GERD (gastroesophageal reflux disease)     diet managed, occasional    Hallux rigidus     LAST ASSESSED 21JUL2015 L grreat toe    Heart murmur From birth    Hyperlipidemia     Lyme disease     LAST ASSESSED [id number]    Murmur, cardiac     functional    Trigger finger     ,r hand ring finger and middle; L same fingers    Trigger middle finger of left hand 04/12/2022    Trigger ring finger of left hand 04/12/2022    Wears glasses        Past Surgical History:   Procedure Laterality Date    COLONOSCOPY      LAST ASSESSED [id number]    DILATION AND CURETTAGE OF UTERUS      ENDOMETRIAL ABLATION      ENDOMETRIAL CRYOABLATION      WITH ULTRASOUND GUIDE     IR  EPIDURAL STEROID INJECTION      KNEE ARTHROSCOPY      b/l meniscus    AK ARTHRODESIS GREAT TOE METATARSOPHALANGEAL JOINT Left 1/18/2019    Procedure: GREAT TOE FUSION WITH PLATE;  Surgeon: Anthony Tristan DPM;  Location: [facility];  Service: Podiatry    AK ARTHRODESIS GREAT TOE METATARSOPHALANGEAL JOINT Right 11/18/2022    Procedure: ARTHRODESIS / FUSION FOOT;  Surgeon: Anthony Tristan DPM;  Location: [facility];  Service: Podiatry    AK TENDON SHEATH INCISION Left 4/29/2022    Procedure: RELEASE TRIGGER FINGER, LEFT MIDDLE AND RING;  Surgeon: Ida Ron MD;  Location: [facility];  Service: Orthopedics    AK TENDON SHEATH INCISION Right 6/22/2022    Procedure: RELEASE TRIGGER FINGER, RIGHT MIDDLE AND RING FINGER;  Surgeon: Ida Ron MD;  Location: [facility];  Service: Orthopedics    TONSILLECTOMY AND ADENOIDECTOMY         Social History     Socioeconomic History    Marital status: /Civil Union     Spouse name: Not on file    Number of children: 2    Years of education: Not on file    Highest education level: Not on file   Occupational History    Occupation: Office work     Comment: Part-time   Tobacco Use    Smoking status: Never    Smokeless tobacco: Never   Vaping Use    Vaping status: Never Used   Substance and Sexual Activity    Alcohol use: Yes     Comment: Very occasionally    Drug use: No    Sexual activity: Yes     Partners: Male     Birth control/protection: Male Sterilization   Other Topics Concern    Not on file   Social History Narrative    EXERCISE REGULARLY      Social Determinants of Health     Financial Resource Strain: Low Risk  (8/31/2020)    Overall Financial Resource Strain (CARDIA)     Difficulty of Paying Living Expenses: Not hard at all   Food Insecurity: No Food Insecurity (8/31/2020)    Hunger Vital Sign     Worried About Running Out of Food in the Last Year: Never true     Ran Out of Food in the Last Year: Never true   Transportation Needs: No Transportation Needs  (8/31/2020)    PRAPARE - Transportation     Lack of Transportation (Medical): No     Lack of Transportation (Non-Medical): No   Physical Activity: Sufficiently Active (10/25/2021)    Exercise Vital Sign     Days of Exercise per Week: 3 days     Minutes of Exercise per Session: 60 min   Recent Concern: Physical Activity - Insufficiently Active (10/11/2021)    Exercise Vital Sign     Days of Exercise per Week: 2 days     Minutes of Exercise per Session: 60 min   Stress: Stress Concern Present (10/25/2021)    [employer] - Occupational Stress Questionnaire     Feeling of Stress : Very much   Social Connections: Moderately Integrated (8/31/2020)    Social Connection and Isolation Panel [NHANES]     Frequency of Communication with Friends and Family: More than three times a week     Frequency of Social Gatherings with Friends and Family: More than three times a week     Attends Yarsanism Services: 1 to 4 times per year     Active Member of Clubs or Organizations: No     Attends Club or Organization Meetings: Never     Marital Status:    Intimate Partner Violence: Not At Risk (10/26/2022)    Humiliation, Afraid, Rape, and Kick questionnaire     Fear of Current or Ex-Partner: No     Emotionally Abused: No     Physically Abused: No     Sexually Abused: No   Housing Stability: Not on file       Family History   Problem Relation Age of Onset    Heart disease Mother         Heart failure and valve replacement    Colon polyps Mother     Stroke Mother     COPD Mother     Hypertension Father     Kidney failure Father     Hyperlipidemia Father     Coronary artery disease Family     Hypertension Family     Lung cancer Sister 60    Asthma Daughter     Breast cancer Maternal Grandmother 40    Breast cancer Paternal Grandmother 80    Arthritis Paternal Grandmother     Breast cancer Maternal Aunt     No Known Problems Maternal Aunt     No Known Problems Maternal Aunt     No Known Problems Maternal  "Aunt     No Known Problems Paternal Aunt     No Known Problems Sister     No Known Problems Maternal Grandfather     Arthritis Paternal Grandfather     No Known Problems Son     Cancer Sister         Lung cancer with the ALK gene       No Known Allergies      Current Outpatient Medications:     Adalimumab (Humira, 2 Pen,) 40 MG/0.4ML PNKT, Inject 40 mg under the skin every 14 (fourteen) days, Disp: 2 each, Rfl: 11    atorvastatin (LIPITOR) 40 mg tablet, Take 1 tablet (40 mg total) by mouth daily at bedtime, Disp: 90 tablet, Rfl: 0    Calcium Carbonate-Vitamin D (CALCIUM 600+D PO), Take 1 tablet by mouth daily, Disp: , Rfl:     Multiple Vitamins-Minerals (multivitamin with minerals) tablet, Take 1 tablet by mouth daily, Disp: 30 tablet, Rfl: 1    ascorbic acid (VITAMIN C) 500 MG tablet, Take 1 tablet (500 mg total) by mouth 2 (two) times a day (Patient not taking: Reported on 4/15/2024), Disp: 60 tablet, Rfl: 1    cholecalciferol (VITAMIN D3) 1,000 units tablet, Take 2 tablets (2,000 Units total) by mouth daily (Patient not taking: Reported on 4/15/2024), Disp: 60 tablet, Rfl: 1    multivitamin (THERAGRAN) TABS, Take 1 tablet by mouth daily at bedtime   (Patient not taking: Reported on 4/15/2024), Disp: , Rfl:       Objective:    Vitals:    04/15/24 1317   BP: 126/80   Pulse: 72   Temp: (!) 97.2 °F (36.2 °C)   SpO2: 96%   Weight: 88 kg (194 lb)   Height: 5' 3\" (1.6 m)       Physical Exam  General: Well appearing, well nourished, in no acute distress. Oriented x 3, normal mood and affect.  Ambulating without difficulty.  Skin: Good turgor, no rash, unusual bruising or prominent lesions.  Hair: Normal texture and distribution.  Nails: Normal color, no deformities.  HEENT:  Head: Normocephalic, atraumatic.  Eyes: Conjunctiva clear, sclera non-icteric, EOM intact.  Nose: No external lesions, mucosa non-inflamed.  Mouth: Mucous membranes moist, no mucosal lesions.  Neck: Supple   Musculoskeletal:   + Mild warmth and " tenderness of the medial aspect of the right knee  No tenderness to palpation or swelling of joints bilaterally to include: shoulder, elbow, wrist, MCP I-V, PIP I-V, knee, ankle  Neurologic: Alert and oriented. No focal neurological deficits appreciated.   Psychiatric: Normal mood and affect.       Jesus Hernandez PA-C  Rheumatology

## 2024-04-23 ENCOUNTER — HOSPITAL ENCOUNTER (OUTPATIENT)
Dept: BONE DENSITY | Facility: IMAGING CENTER | Age: 64
Discharge: HOME | End: 2024-04-23
Payer: COMMERCIAL

## 2024-04-23 VITALS — WEIGHT: 197 LBS | BODY MASS INDEX: 34.91 KG/M2 | HEIGHT: 63 IN

## 2024-04-23 PROCEDURE — 77080 DXA BONE DENSITY AXIAL: CPT

## 2024-04-25 DIAGNOSIS — E78.5 HYPERLIPIDEMIA, UNSPECIFIED HYPERLIPIDEMIA TYPE: ICD-10-CM

## 2024-04-25 NOTE — TELEPHONE ENCOUNTER
Reason for call:   [x] Refill   [] Prior Auth  [] Other:     Office:   [x] PCP/Provider -   [] Specialty/Provider -     Medication: Atorvastatin     Dose/Frequency: 40 mg tablet taken by mouth daily at bedtime     Quantity: 90    Pharmacy: [facility] [address] [phone]     Does the patient have enough for 3 days?   [x] Yes   [] No - Send as HP to POD

## 2024-04-26 RX ORDER — ATORVASTATIN CALCIUM 40 MG/1
40 TABLET, FILM COATED ORAL
Qty: 90 TABLET | Refills: 1 | Status: SHIPPED | OUTPATIENT
Start: 2024-04-26

## 2024-05-24 DIAGNOSIS — M17.11 PRIMARY OSTEOARTHRITIS OF RIGHT KNEE: ICD-10-CM

## 2024-05-24 RX ORDER — MELATONIN
2000 DAILY
Qty: 60 TABLET | Refills: 1 | Status: SHIPPED | OUTPATIENT
Start: 2024-05-24

## 2024-06-26 ENCOUNTER — TELEPHONE (OUTPATIENT)
Dept: OBGYN CLINIC | Facility: HOSPITAL | Age: 64
End: 2024-06-26

## 2024-06-26 DIAGNOSIS — M17.11 PRIMARY OSTEOARTHRITIS OF RIGHT KNEE: Primary | ICD-10-CM

## 2024-06-26 NOTE — TELEPHONE ENCOUNTER
Caller: Patient     Doctor: Stephon     Reason for call: Patient returning missed call. She will be available this afternoon and tomorrow morning for a call back     Call back#: [phone]

## 2024-06-27 PROBLEM — M17.11 PRIMARY OSTEOARTHRITIS OF RIGHT KNEE: Status: ACTIVE | Noted: 2024-06-27

## 2024-06-27 LAB
DME PARACHUTE DELIVERY DATE REQUESTED: NORMAL
DME PARACHUTE ITEM DESCRIPTION: NORMAL
DME PARACHUTE ORDER STATUS: NORMAL
DME PARACHUTE SUPPLIER NAME: NORMAL
DME PARACHUTE SUPPLIER PHONE: NORMAL

## 2024-06-27 NOTE — TELEPHONE ENCOUNTER
Preoperative Elective Admission Assessment    Living Situation:    Who does pt live with: spouse  What kind of home: multi-level  How do they enter the home: front  How many levels in home: 2   # of steps to enter home: 2  # of steps to second floor: 13  Are there handrails: Yes  Are there landings: No  Sleeping arrangement: first/entry floor  Where is Bathroom: entry level  Where is the tub or shower: walk in shower with grab bars and seat  Dogs or ther pets: 1 dog     First Floor Setup:   Is there a bathroom: Yes  Where would pt sleep:  vasyl     DME:  NN ordered RW on 6/27  We discussed clearing pathways in the home and making sure there is accessibly to use the walker, for example, removing throw rugs.      Patient's Current Level of Function: Ambulates: Independently and ADLs: Independent    Post-op Caregiver: spouse and daughter  Caregiver Name and phone number for Inpatient discharge needs: Shay  Currently receive any HHC/aides/community supports: No     Post-op Transport: spouse  To/from hospital: spouse  To/from PT 2-3x/week: spouse  Uses community transport now: No     Outpatient Physical Therapy Site:  Site: [facility]  pre and post-op appts scheduled? Yes     Medication Management: self and pillbox  Preferred Pharmacy for Post-op Medications: [facility]  Blood Management Vitamin Regimen: Pt confirms taking as prescribed  Post-op anticoagulant: to be determined by surgical team postoperatively     DC Plan: Pt plans to be discharged home      Barriers to DC identified preoperatively: none identified    BMI: 35.46    Patient Education:  Pt educated on post-op pain, early mobilization (POD0), LOS goals, OP PT goals, and preoperative bathing. Patient educated that our goal is to appropriately discharge patient based off their post-op function while striving to maintain maximal independence. The goal is to discharge patient to home and for them to attend outpatient physical therapy.    Assigned to  care team? Yes

## 2024-07-08 ENCOUNTER — APPOINTMENT (OUTPATIENT)
Dept: LAB | Facility: CLINIC | Age: 64
End: 2024-07-08

## 2024-07-08 DIAGNOSIS — M17.11 PRIMARY OSTEOARTHRITIS OF RIGHT KNEE: ICD-10-CM

## 2024-07-09 LAB
ABO GROUP BLD: NORMAL
ALBUMIN SERPL-MCNC: 4.2 G/DL (ref 3.9–4.9)
ALP SERPL-CCNC: 65 IU/L (ref 44–121)
ALT SERPL-CCNC: 28 IU/L (ref 0–32)
APTT PPP: 26 SEC (ref 24–33)
AST SERPL-CCNC: 22 IU/L (ref 0–40)
BASOPHILS # BLD AUTO: 0.1 X10E3/UL (ref 0–0.2)
BASOPHILS NFR BLD AUTO: 1 %
BILIRUB SERPL-MCNC: 0.8 MG/DL (ref 0–1.2)
BLD GP AB SCN SERPL QL: NEGATIVE
BUN SERPL-MCNC: 20 MG/DL (ref 8–27)
BUN/CREAT SERPL: 21 (ref 12–28)
CALCIUM SERPL-MCNC: 9.9 MG/DL (ref 8.7–10.3)
CHLORIDE SERPL-SCNC: 106 MMOL/L (ref 96–106)
CO2 SERPL-SCNC: 22 MMOL/L (ref 20–29)
CREAT SERPL-MCNC: 0.96 MG/DL (ref 0.57–1)
DME PARACHUTE DELIVERY DATE ACTUAL: NORMAL
DME PARACHUTE DELIVERY DATE REQUESTED: NORMAL
DME PARACHUTE ITEM DESCRIPTION: NORMAL
DME PARACHUTE ORDER STATUS: NORMAL
DME PARACHUTE SUPPLIER NAME: NORMAL
DME PARACHUTE SUPPLIER PHONE: NORMAL
EGFR: 67 ML/MIN/1.73
EOSINOPHIL # BLD AUTO: 0.1 X10E3/UL (ref 0–0.4)
EOSINOPHIL NFR BLD AUTO: 3 %
ERYTHROCYTE [DISTWIDTH] IN BLOOD BY AUTOMATED COUNT: 12.1 % (ref 11.7–15.4)
EST. AVERAGE GLUCOSE BLD GHB EST-MCNC: 126 MG/DL
FERRITIN SERPL-MCNC: 140 NG/ML (ref 15–150)
FOLATE SERPL-MCNC: >20 NG/ML
GLOBULIN SER-MCNC: 2.2 G/DL (ref 1.5–4.5)
GLUCOSE SERPL-MCNC: 106 MG/DL (ref 70–99)
HBA1C MFR BLD: 6 % (ref 4.8–5.6)
HCT VFR BLD AUTO: 45.2 % (ref 34–46.6)
HGB BLD-MCNC: 15 G/DL (ref 11.1–15.9)
IMM GRANULOCYTES # BLD: 0 X10E3/UL (ref 0–0.1)
IMM GRANULOCYTES NFR BLD: 0 %
INR PPP: 0.9 (ref 0.9–1.2)
IRON SATN MFR SERPL: 39 % (ref 15–55)
IRON SERPL-MCNC: 124 UG/DL (ref 27–139)
LYMPHOCYTES # BLD AUTO: 1.5 X10E3/UL (ref 0.7–3.1)
LYMPHOCYTES NFR BLD AUTO: 34 %
MCH RBC QN AUTO: 31.1 PG (ref 26.6–33)
MCHC RBC AUTO-ENTMCNC: 33.2 G/DL (ref 31.5–35.7)
MCV RBC AUTO: 94 FL (ref 79–97)
MONOCYTES # BLD AUTO: 0.5 X10E3/UL (ref 0.1–0.9)
MONOCYTES NFR BLD AUTO: 11 %
NEUTROPHILS # BLD AUTO: 2.2 X10E3/UL (ref 1.4–7)
NEUTROPHILS NFR BLD AUTO: 51 %
PLATELET # BLD AUTO: 158 X10E3/UL (ref 150–450)
POTASSIUM SERPL-SCNC: 4.3 MMOL/L (ref 3.5–5.2)
PROT SERPL-MCNC: 6.4 G/DL (ref 6–8.5)
PROTHROMBIN TIME: 10.1 SEC (ref 9.1–12)
RBC # BLD AUTO: 4.83 X10E6/UL (ref 3.77–5.28)
RETICS/RBC NFR AUTO: 1.4 % (ref 0.6–2.6)
RH BLD: POSITIVE
SODIUM SERPL-SCNC: 139 MMOL/L (ref 134–144)
TIBC SERPL-MCNC: 322 UG/DL (ref 250–450)
UIBC SERPL-MCNC: 198 UG/DL (ref 118–369)
VIT B12 SERPL-MCNC: 744 PG/ML (ref 232–1245)
WBC # BLD AUTO: 4.3 X10E3/UL (ref 3.4–10.8)

## 2024-07-09 RX ORDER — ADALIMUMAB 40MG/0.4ML
KIT SUBCUTANEOUS
COMMUNITY
Start: 2023-09-27

## 2024-07-09 NOTE — PRE-PROCEDURE INSTRUCTIONS
Pre-Surgery Instructions:   Medication Instructions    ascorbic acid (VITAMIN C) 500 MG tablet Hold day of surgery.    atorvastatin (LIPITOR) 40 mg tablet Take night before surgery    Calcium Carbonate-Vitamin D (CALCIUM 600+D PO) Stop taking 7 days prior to surgery.    cholecalciferol (VITAMIN D3) 1,000 units tablet Hold day of surgery.    Humira, 2 Pen, 40 MG/0.4ML PNKT Instructions provided by MD    Multiple Vitamins-Minerals (multivitamin with minerals) tablet Hold day of surgery.   Medication instructions for day surgery reviewed. Please use only a sip of water to take your instructed medications. Avoid all over the counter vitamins, supplements and NSAIDS for one week prior to surgery per anesthesia guidelines. Tylenol is ok to take as needed.     You will receive a call one business day prior to surgery with an arrival time and hospital directions. If your surgery is scheduled on a Monday, the hospital will be calling you on the Friday prior to your surgery. If you have not heard from anyone by 8pm, please call the hospital supervisor through the hospital  at [phone]. ([place] [phone] or [place] [phone]).    Do not eat or drink anything after midnight the night before your surgery, including candy, mints, lifesavers, or chewing gum. Do not drink alcohol 24hrs before your surgery. Try not to smoke at least 24hrs before your surgery.       Follow the pre surgery showering instructions as listed in the “My Surgical Experience Booklet” or otherwise provided by your surgeon's office. Do not use a blade to shave the surgical area 1 week before surgery. It is okay to use a clean electric clippers up to 24 hours before surgery. Do not apply any lotions, creams, including makeup, cologne, deodorant, or perfumes after showering on the day of your surgery. Do not use dry shampoo, hair spray, hair gel, or any type of hair products.     No contact lenses, eye make-up, or artificial eyelashes.  Remove nail polish, including gel polish, and any artificial, gel, or acrylic nails if possible. Remove all jewelry including rings and body piercing jewelry.     Wear causal clothing that is easy to take on and off. Consider your type of surgery.    Keep any valuables, jewelry, piercings at home. Please bring any specially ordered equipment (sling, braces) if indicated.    Arrange for a responsible person to drive you to and from the hospital on the day of your surgery. Please confirm the visitor policy for the day of your procedure when you receive your phone call with an arrival time.     Call the surgeon's office with any new illnesses, exposures, or additional questions prior to surgery.    Please reference your “My Surgical Experience Booklet” for additional information to prepare for your upcoming surgery.     Confirmed patient received ortho bag. Encouraged to read all information provided.

## 2024-07-10 ENCOUNTER — OFFICE VISIT (OUTPATIENT)
Dept: FAMILY MEDICINE CLINIC | Facility: CLINIC | Age: 64
End: 2024-07-10
Payer: COMMERCIAL

## 2024-07-10 VITALS
TEMPERATURE: 97.7 F | SYSTOLIC BLOOD PRESSURE: 120 MMHG | HEART RATE: 80 BPM | OXYGEN SATURATION: 95 % | HEIGHT: 63 IN | DIASTOLIC BLOOD PRESSURE: 80 MMHG | WEIGHT: 197 LBS | RESPIRATION RATE: 16 BRPM | BODY MASS INDEX: 34.91 KG/M2
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
## 2024-07-10 DIAGNOSIS — S70.261A TICK BITE OF RIGHT HIP, INITIAL ENCOUNTER: ICD-10-CM

## 2024-07-10 DIAGNOSIS — M17.11 PRIMARY OSTEOARTHRITIS OF RIGHT KNEE: Primary | ICD-10-CM

## 2024-07-10 PROCEDURE — 99213 OFFICE O/P EST LOW 20 MIN: CPT | Performed by: FAMILY MEDICINE

## 2024-07-10 RX ORDER — DOXYCYCLINE 100 MG/1
100 CAPSULE ORAL 2 TIMES DAILY
Qty: 20 CAPSULE | Refills: 0 | Status: SHIPPED | OUTPATIENT
Start: 2024-07-10 | End: 2024-07-20

## 2024-07-10 NOTE — H&P (VIEW-ONLY)
"    Assessment/Plan:     Diagnoses and all orders for this visit:    Primary osteoarthritis of right knee  -     Ambulatory referral to Family Practice    Tick bite of right hip, initial encounter  -     doxycycline monohydrate (MONODOX) 100 mg capsule; Take 1 capsule (100 mg total) by mouth 2 (two) times a day for 10 days        Patient cleared for her surgery  Will prophylactically treat her tick bite with doxycycline to eliminate any complications from her upcoming surgery as well  She can follow-up as needed or as scheduled for her routine care    Subjective:     Chief Complaint   Patient presents with    Pre-op Exam     / slub right total knee 7/31        Patient ID: Arcelia Rivas is a 64 y.o. female.    Patient presents today for preop clearance  Patient also recently had a tick bite of her right hip  Reviewed her preop testing and it was normal  No other issues today        The following portions of the patient's history were reviewed and updated as appropriate: allergies, current medications, past family history, past medical history, past social history, past surgical history and problem list.    Review of Systems   Constitutional: Negative.    HENT: Negative.     Eyes: Negative.    Respiratory: Negative.     Cardiovascular: Negative.    Gastrointestinal: Negative.    Endocrine: Negative.    Genitourinary: Negative.    Musculoskeletal: Negative.    Skin:  Positive for wound.   Allergic/Immunologic: Negative.    Neurological: Negative.    Hematological: Negative.    Psychiatric/Behavioral: Negative.     All other systems reviewed and are negative.        Objective:    Vitals:    07/10/24 1019 07/10/24 1049   BP: 142/82 120/80   BP Location: Left arm    Patient Position: Sitting    Cuff Size: Standard    Pulse: 80    Resp: 16    Temp: 97.7 °F (36.5 °C)    TempSrc: Tympanic    SpO2: 95%    Weight: 89.4 kg (197 lb)    Height: 5' 2.5\" (1.588 m)           Physical Exam  Vitals and nursing note reviewed. "   Constitutional:       Appearance: Normal appearance. She is well-developed.   HENT:      Head: Normocephalic and atraumatic.      Right Ear: External ear normal.      Left Ear: External ear normal.   Eyes:      Conjunctiva/sclera: Conjunctivae normal.      Pupils: Pupils are equal, round, and reactive to light.   Cardiovascular:      Rate and Rhythm: Normal rate and regular rhythm.      Heart sounds: Normal heart sounds.   Pulmonary:      Effort: Pulmonary effort is normal.      Breath sounds: Normal breath sounds.   Abdominal:      General: Bowel sounds are normal.      Palpations: Abdomen is soft.   Musculoskeletal:         General: Normal range of motion.      Cervical back: Normal range of motion.   Skin:     General: Skin is warm and dry.   Neurological:      General: No focal deficit present.      Mental Status: She is alert and oriented to person, place, and time.      Deep Tendon Reflexes: Reflexes are normal and symmetric.   Psychiatric:         Behavior: Behavior normal.         Thought Content: Thought content normal.         Judgment: Judgment normal.

## 2024-07-14 DIAGNOSIS — M17.11 PRIMARY OSTEOARTHRITIS OF RIGHT KNEE: ICD-10-CM

## 2024-07-14 RX ORDER — CHOLECALCIFEROL (VITAMIN D3) 25 MCG
2000 TABLET ORAL DAILY
Qty: 60 TABLET | Refills: 5 | Status: SHIPPED | OUTPATIENT
Start: 2024-07-14

## 2024-07-17 ENCOUNTER — TELEPHONE (OUTPATIENT)
Age: 64
End: 2024-07-17

## 2024-07-17 NOTE — TELEPHONE ENCOUNTER
Cherri from [facility] physical therapy called in for insurance referral.  We might need a ambulatory referral. I included pec team in this route.    Patient is requesting an insurance referral for the following specialty:      Test Name / Order Name: Physical Therapy    DX Code: M17.11    Date Of Service: 7/24/24    Location/Facility Name/Address/Phone #:[facility]/[address] [facility] [place] [address]/ [phone]     Location / Facility NPI: [id number]    Best Phone # To Reach The Patient:  [phone]

## 2024-07-22 ENCOUNTER — OFFICE VISIT (OUTPATIENT)
Dept: LAB | Facility: HOSPITAL | Age: 64
End: 2024-07-22
Payer: COMMERCIAL

## 2024-07-22 PROCEDURE — 93005 ELECTROCARDIOGRAM TRACING: CPT

## 2024-07-23 NOTE — PROGRESS NOTES
PT Evaluation     Today's date: 2024  Patient name: Arcelia Rivas  : 1960  MRN: [id number]  Referring provider: Margret Cunha PA-C  Dx:   Encounter Diagnosis     ICD-10-CM    1. Primary osteoarthritis of right knee  M17.11 Ambulatory referral to Physical Therapy                     Assessment  Impairments: abnormal gait, abnormal muscle firing, abnormal or restricted ROM, activity intolerance, impaired balance, impaired physical strength, lacks appropriate home exercise program, pain with function and weight-bearing intolerance    Assessment details: Arcelia Rivas is a 64 y.o. female presenting to physical therapy pre-operatively scheduled for a right total knee arthroplasty scheduled with Dr. Szymanski on 24. Patient presents with pain, decreased strength, decreased range of motion, decreased joint mobility and decreased tolerance to activity. Due to these impairments patient has difficulty performing activities of daily living, recreational activities and engaging in social activities. Patient would benefit from skilled physical therapy services to address these impairments and to maximize function. Thank you for the referral.   Understanding of Dx/Px/POC: excellent     Prognosis: excellent    Goals  Impairment Goals:  1.) Pt will have decreased sx at rest by 50% in 2-4 weeks post-operatively.  2.) Pt will have improved knee range of motion to 120 degrees flexion and 0 degrees extension in 3-4 weeks post-operatively.  3.) Pt will have improved knee strength by 1/2 MMT in 4-6 weeks post-operatively.  4.) Pt will have decreased tenderness to palpation to medial and lateral joint line in 3-4 weeks post-operatively.    Functional Goals:  1.) Pt will be independent in their home exercise program in 1 week post-operatively.  2.) Pt will be able to traverse stairs without pain in 3-4 weeks post-operatively.  3.) Pt will be able to walk for 10 minutes without pain in 3-4 weeks post-operatively.  4.) Pt will  have an improved FOTO score of 75/100 in 6-8 weeks post-operatively.      Plan  Patient would benefit from: skilled PT    Planned therapy interventions: joint mobilization, manual therapy, patient education, therapeutic exercise, gait training, flexibility, home exercise program, behavior modification, neuromuscular re-education, graded activity, graded exercise, activity modification, therapeutic activities, strengthening and stretching    Frequency: 2x week  Duration in weeks: 8  Plan of Care beginning date: 2024  Plan of Care expiration date: 2024      Subjective Evaluation    History of Present Illness  Mechanism of injury: Arcelia Rivas is a 64 y.o. female presenting to physical therapy pre-operatively scheduled for a right total knee arthroplasty scheduled with Dr. Szymanski on 24. She explains that she had a R menisectomy about 15 years ago. 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     She will have help at home, notes her bed and bath is on first floor. 2 single steps to enter the home and. 2 dogs at home which she can let outside in a fenced yard. Her daughter lives next door and is a physical therapist. She is equipped with all necessary assistive devices and chair modifications and all pre-operative questions were answered to patients satisfaction.    She wants to get back to gardening, playing with grandchildren, walking without limitations in the knee.        Patient Goals  Patient goals for therapy: decreased pain, increased motion, return to sport/leisure activities and increased strength    Pain  Current pain ratin  At best pain ratin  At worst pain ratin  Quality: dull ache, sharp and tight  Relieving factors: medications and ice  Aggravating factors: standing, walking, stair climbing and running        Objective     Active Range of Motion   Left Knee   Flexion: 130 degrees   Extension: 0 degrees     Right  Knee   Flexion: 125 degrees   Extension: 0 degrees     Swelling     Right Knee Girth Measurement (cm)   Joint line: 44 cm  10 cm above joint line: 52 cm  10 cm below joint line: 45 cm    Ambulation     Observational Gait   Increased right swing time. Decreased walking speed and right stance time.              Precautions: s/p R TKA 7/31       Manuals 7/24                                                                Neuro Re-Ed                                                                                                        Ther Ex             Quad sets             Glute sets             Ankle pumps             Seated knee flexion                                                                 Ther Activity                                       Gait Training                                       Modalities

## 2024-07-24 ENCOUNTER — EVALUATION (OUTPATIENT)
Dept: PHYSICAL THERAPY | Facility: CLINIC | Age: 64
End: 2024-07-24
Payer: COMMERCIAL

## 2024-07-24 DIAGNOSIS — M17.11 PRIMARY OSTEOARTHRITIS OF RIGHT KNEE: Primary | ICD-10-CM

## 2024-07-24 LAB
ATRIAL RATE: 69 BPM
P AXIS: 34 DEGREES
PR INTERVAL: 152 MS
QRS AXIS: 33 DEGREES
QRSD INTERVAL: 84 MS
QT INTERVAL: 396 MS
QTC INTERVAL: 424 MS
T WAVE AXIS: 15 DEGREES
VENTRICULAR RATE: 69 BPM

## 2024-07-24 PROCEDURE — 93010 ELECTROCARDIOGRAM REPORT: CPT | Performed by: INTERNAL MEDICINE

## 2024-07-24 PROCEDURE — 97161 PT EVAL LOW COMPLEX 20 MIN: CPT | Performed by: PHYSICAL THERAPIST

## 2024-07-24 PROCEDURE — 97110 THERAPEUTIC EXERCISES: CPT | Performed by: PHYSICAL THERAPIST

## 2024-07-27 NOTE — PROGRESS NOTES
Assessment:     1  Aftercare following surgery of the musculoskeletal system        Plan:     Problem List Items Addressed This Visit        Other    Aftercare following surgery of the musculoskeletal system - Primary     Patient is doing very well status post right long and ring trigger finger releases  She is doing excellent  She has no restrictions  Advised patient to massage the scars with vitamin E cream to soften up the scar tissue  Follow-up as needed if any problems arise  All questions were answered to patient's satisfaction  Subjective:     Patient ID: Riya Lindsay is a 63 y o  female  Chief Complaint: This is a 27-year-old white female who is status post right long and ring trigger finger releases on June 22, 2022  She is doing very well  Triggering has completely resolved  She is back doing her normal activities      Allergy:  No Known Allergies  Medications:  all current active meds have been reviewed  Past Medical History:  Past Medical History:   Diagnosis Date    Abnormal mammogram     RESOLVED [id number]    Arthritis     Depression     GERD (gastroesophageal reflux disease)     diet managed, occasional    Hallux rigidus     LAST ASSESSED [id number] L grreat toe    Heart murmur From birth    Hyperlipidemia     Lyme disease     LAST ASSESSED [id number]    Murmur, cardiac     functional    PONV (postoperative nausea and vomiting)     Trigger finger     ,r hand ring finger and middle; L same fingers    Trigger middle finger of left hand 4/12/2022    Trigger ring finger of left hand 4/12/2022    Wears glasses      Past Surgical History:  Past Surgical History:   Procedure Laterality Date    COLONOSCOPY      LAST ASSESSED [id number]    DILATION AND CURETTAGE OF UTERUS      ENDOMETRIAL ABLATION      ENDOMETRIAL CRYOABLATION      WITH ULTRASOUND GUIDE     IR EPIDURAL STEROID INJECTION      KNEE ARTHROSCOPY      b/l meniscus    KS FUSION BIG TOE,MT-P JT Left 1/18/2019 Procedure: GREAT TOE FUSION WITH PLATE;  Surgeon: Angela Cooney DPM;  Location: [facility];  Service: Podiatry    W[place] INCISE FINGER TENDON SHEATH Left 4/29/2022    Procedure: RELEASE TRIGGER FINGER, LEFT MIDDLE AND RING;  Surgeon: Angelina Curran MD;  Location: [facility];  Service: Orthopedics    [place] INCISE FINGER TENDON SHEATH Right 6/22/2022    Procedure: RELEASE TRIGGER FINGER, RIGHT MIDDLE AND RING FINGER;  Surgeon: Angelina Curran MD;  Location: [facility];  Service: Orthopedics    TONSILLECTOMY AND ADENOIDECTOMY       Family History:  Family History   Problem Relation Age of Onset    Heart disease Mother     Colon polyps Mother     Stroke Mother     COPD Mother     Hypertension Father     Kidney failure Father     Hyperlipidemia Father     Coronary artery disease Family     Hypertension Family     Lung cancer Sister 61    No Known Problems Daughter     Breast cancer Maternal Grandmother 36    Breast cancer Paternal Grandmother [de-identified]    Breast cancer Maternal Aunt     No Known Problems Maternal Aunt     No Known Problems Maternal Aunt     No Known Problems Maternal Aunt     No Known Problems Paternal Aunt     No Known Problems Sister     No Known Problems Maternal Grandfather     Arthritis Paternal Grandfather     No Known Problems Son     Cancer Sister      Social History:  Social History     Substance and Sexual Activity   Alcohol Use Yes    Alcohol/week: 0 0 standard drinks    Comment: Very seldom     Social History     Substance and Sexual Activity   Drug Use No     Social History     Tobacco Use   Smoking Status Never Smoker   Smokeless Tobacco Never Used     Review of Systems   Constitutional: Negative  HENT: Negative  Eyes: Negative  Respiratory: Negative  Cardiovascular: Negative  Gastrointestinal: Negative  Endocrine: Negative  Genitourinary: Negative  Musculoskeletal: Negative for arthralgias and joint swelling  Skin: Negative      Allergic/Immunologic: Negative  Neurological: Negative  Hematological: Negative  Psychiatric/Behavioral: Negative  Objective:  BP Readings from Last 1 Encounters:   07/28/22 122/80      Wt Readings from Last 1 Encounters:   07/28/22 88 5 kg (195 lb)      BMI:   Estimated body mass index is 34 54 kg/m² as calculated from the following:    Height as of this encounter: 5' 3" (1 6 m)  Weight as of this encounter: 88 5 kg (195 lb)  BSA:   Estimated body surface area is 1 91 meters squared as calculated from the following:    Height as of this encounter: 5' 3" (1 6 m)  Weight as of this encounter: 88 5 kg (195 lb)  Physical Exam  Constitutional:       General: She is not in acute distress  Appearance: She is well-developed  HENT:      Head: Normocephalic  Eyes:      Conjunctiva/sclera: Conjunctivae normal       Pupils: Pupils are equal, round, and reactive to light  Pulmonary:      Effort: Pulmonary effort is normal  No respiratory distress  Skin:     General: Skin is warm and dry  Neurological:      Mental Status: She is alert and oriented to person, place, and time  Psychiatric:         Behavior: Behavior normal        Right Hand Exam     Range of Motion   Hand   MP Middle: normal   MP Ring: normal   PIP Middle: normal   PIP Ring: normal   DIP Middle: normal   DIP Ring: normal     Other   Erythema: absent  Scars: present (Well-healed incisions with no evidence of infection   )  Sensation: normal  Pulse: present    Comments:    No active triggering            No new imaging  Staff Member

## 2024-07-30 ENCOUNTER — ANESTHESIA EVENT (OUTPATIENT)
Dept: PERIOP | Facility: HOSPITAL | Age: 64
End: 2024-07-30
Payer: COMMERCIAL

## 2024-07-30 NOTE — ANESTHESIA PREPROCEDURE EVALUATION
Procedure:  ARTHROPLASTY KNEE TOTAL- SAME DAY (Right: Knee)    Relevant Problems   CARDIO   (+) Hyperlipidemia   (+) Murmur, cardiac      GI/HEPATIC   (+) GERD without esophagitis      MUSCULOSKELETAL   (+) Primary osteoarthritis of right knee      NEURO/PSYCH   (+) Other depressive disorder      Rheumatology   (+) Trigger middle finger of right hand   (+) Trigger ring finger of right hand      Orthopedic/Musculoskeletal   (+) Lumbar foraminal stenosis   (+) Lumbar radiculitis      Other   (+) History of colon polyps      Lab Results   Component Value Date     08/09/2017    SODIUM 139 07/08/2024    K 4.3 07/08/2024     07/08/2024    CO2 22 07/08/2024    BUN 20 07/08/2024    CREATININE 0.96 07/08/2024    GLUC 106 (H) 07/08/2024    CALCIUM 10.0 08/09/2017    AST 22 07/08/2024    ALT 28 07/08/2024    ALKPHOS 61 08/09/2017    PROT 6.9 08/09/2017    TP 6.4 07/08/2024    BILITOT 0.5 08/09/2017    TBILI 0.8 07/08/2024    EGFR 67 07/08/2024     Lab Results   Component Value Date    WBC 4.3 07/08/2024    HGB 15.0 07/08/2024    HCT 45.2 07/08/2024    MCV 94 07/08/2024     07/08/2024       Physical Exam    Airway    Mallampati score: II  TM Distance: >3 FB  Neck ROM: full     Dental       Cardiovascular      Pulmonary      Other Findings  post-pubertal.      Anesthesia Plan  ASA Score- 2     Anesthesia Type- spinal with ASA Monitors.         Additional Monitors:     Airway Plan:            Plan Factors-Exercise tolerance (METS): >4 METS.    Chart reviewed. EKG reviewed. Imaging results reviewed. Existing labs reviewed. Patient summary reviewed.                  Induction- intravenous.    Postoperative Plan-         Informed Consent- Anesthetic plan and risks discussed with patient.  I personally reviewed this patient with the CRNA. Discussed and agreed on the Anesthesia Plan with the CRNA..

## 2024-07-31 ENCOUNTER — ANESTHESIA (OUTPATIENT)
Dept: PERIOP | Facility: HOSPITAL | Age: 64
End: 2024-07-31
Payer: COMMERCIAL

## 2024-07-31 ENCOUNTER — APPOINTMENT (OUTPATIENT)
Dept: RADIOLOGY | Facility: HOSPITAL | Age: 64
End: 2024-07-31
Payer: COMMERCIAL

## 2024-07-31 ENCOUNTER — HOSPITAL ENCOUNTER (OUTPATIENT)
Facility: HOSPITAL | Age: 64
Setting detail: OUTPATIENT SURGERY
Discharge: HOME | End: 2024-07-31
Attending: STUDENT IN AN ORGANIZED HEALTH CARE EDUCATION/TRAINING PROGRAM | Admitting: STUDENT IN AN ORGANIZED HEALTH CARE EDUCATION/TRAINING PROGRAM
Payer: COMMERCIAL

## 2024-07-31 VITALS
HEART RATE: 64 BPM | WEIGHT: 195.11 LBS | OXYGEN SATURATION: 97 % | TEMPERATURE: 98 F | BODY MASS INDEX: 34.57 KG/M2 | HEIGHT: 63 IN | SYSTOLIC BLOOD PRESSURE: 141 MMHG | DIASTOLIC BLOOD PRESSURE: 65 MMHG | RESPIRATION RATE: 14 BRPM

## 2024-07-31 DIAGNOSIS — Z96.651 AFTERCARE FOLLOWING RIGHT KNEE JOINT REPLACEMENT SURGERY: Primary | ICD-10-CM

## 2024-07-31 DIAGNOSIS — Z47.1 AFTERCARE FOLLOWING RIGHT KNEE JOINT REPLACEMENT SURGERY: Primary | ICD-10-CM

## 2024-07-31 LAB
ABO GROUP BLD: NORMAL
BLD GP AB SCN SERPL QL: NEGATIVE
GLUCOSE SERPL-MCNC: 92 MG/DL (ref 65–140)
RH BLD: POSITIVE
SPECIMEN EXPIRATION DATE: NORMAL

## 2024-07-31 PROCEDURE — 97167 OT EVAL HIGH COMPLEX 60 MIN: CPT

## 2024-07-31 PROCEDURE — 86901 BLOOD TYPING SEROLOGIC RH(D): CPT | Performed by: STUDENT IN AN ORGANIZED HEALTH CARE EDUCATION/TRAINING PROGRAM

## 2024-07-31 PROCEDURE — C1776 JOINT DEVICE (IMPLANTABLE): HCPCS | Performed by: STUDENT IN AN ORGANIZED HEALTH CARE EDUCATION/TRAINING PROGRAM

## 2024-07-31 PROCEDURE — C9290 INJ, BUPIVACAINE LIPOSOME: HCPCS | Performed by: STUDENT IN AN ORGANIZED HEALTH CARE EDUCATION/TRAINING PROGRAM

## 2024-07-31 PROCEDURE — 73560 X-RAY EXAM OF KNEE 1 OR 2: CPT

## 2024-07-31 PROCEDURE — 82948 REAGENT STRIP/BLOOD GLUCOSE: CPT

## 2024-07-31 PROCEDURE — 27447 TOTAL KNEE ARTHROPLASTY: CPT | Performed by: STUDENT IN AN ORGANIZED HEALTH CARE EDUCATION/TRAINING PROGRAM

## 2024-07-31 PROCEDURE — 27447 TOTAL KNEE ARTHROPLASTY: CPT | Performed by: PHYSICIAN ASSISTANT

## 2024-07-31 PROCEDURE — C1713 ANCHOR/SCREW BN/BN,TIS/BN: HCPCS | Performed by: STUDENT IN AN ORGANIZED HEALTH CARE EDUCATION/TRAINING PROGRAM

## 2024-07-31 PROCEDURE — 97163 PT EVAL HIGH COMPLEX 45 MIN: CPT

## 2024-07-31 PROCEDURE — 86850 RBC ANTIBODY SCREEN: CPT | Performed by: STUDENT IN AN ORGANIZED HEALTH CARE EDUCATION/TRAINING PROGRAM

## 2024-07-31 PROCEDURE — 97116 GAIT TRAINING THERAPY: CPT

## 2024-07-31 PROCEDURE — 86900 BLOOD TYPING SEROLOGIC ABO: CPT | Performed by: STUDENT IN AN ORGANIZED HEALTH CARE EDUCATION/TRAINING PROGRAM

## 2024-07-31 DEVICE — ATTUNE KNEE SYSTEM FEMORAL CRUCIATE RETAINING SIZE 5 RIGHT CEMENTED
Type: IMPLANTABLE DEVICE | Site: KNEE | Status: FUNCTIONAL
Brand: ATTUNE

## 2024-07-31 DEVICE — ATTUNE KNEE SYSTEM TIBIAL INSERT FIXED BEARING MEDIAL STABILIZED RIGHT AOX 5, 6MM
Type: IMPLANTABLE DEVICE | Site: KNEE | Status: FUNCTIONAL
Brand: ATTUNE

## 2024-07-31 DEVICE — ATTUNE KNEE SYSTEM TIBIAL BASE FIXED BEARING SIZE 4 CEMENTED
Type: IMPLANTABLE DEVICE | Site: KNEE | Status: FUNCTIONAL
Brand: ATTUNE

## 2024-07-31 DEVICE — ATTUNE PATELLA MEDIALIZED DOME 35MM CEMENTED AOX
Type: IMPLANTABLE DEVICE | Site: KNEE | Status: FUNCTIONAL
Brand: ATTUNE

## 2024-07-31 DEVICE — SMARTSET HIGH PERFORMANCE MV MEDIUM VISCOSITY BONE CEMENT 40G
Type: IMPLANTABLE DEVICE | Site: KNEE | Status: FUNCTIONAL
Brand: SMARTSET

## 2024-07-31 RX ORDER — METOCLOPRAMIDE HYDROCHLORIDE 5 MG/ML
5 INJECTION INTRAMUSCULAR; INTRAVENOUS ONCE AS NEEDED
Status: DISCONTINUED | OUTPATIENT
Start: 2024-07-31 | End: 2024-07-31 | Stop reason: HOSPADM

## 2024-07-31 RX ORDER — HYDROMORPHONE HCL IN WATER/PF 6 MG/30 ML
0.2 PATIENT CONTROLLED ANALGESIA SYRINGE INTRAVENOUS
Status: DISCONTINUED | OUTPATIENT
Start: 2024-07-31 | End: 2024-07-31 | Stop reason: HOSPADM

## 2024-07-31 RX ORDER — ACETAMINOPHEN 325 MG/1
975 TABLET ORAL ONCE
Status: COMPLETED | OUTPATIENT
Start: 2024-07-31 | End: 2024-07-31

## 2024-07-31 RX ORDER — BUPIVACAINE HYDROCHLORIDE 2.5 MG/ML
INJECTION, SOLUTION EPIDURAL; INFILTRATION; INTRACAUDAL
Status: DISCONTINUED | OUTPATIENT
Start: 2024-07-31 | End: 2024-07-31

## 2024-07-31 RX ORDER — ONDANSETRON 2 MG/ML
INJECTION INTRAMUSCULAR; INTRAVENOUS AS NEEDED
Status: DISCONTINUED | OUTPATIENT
Start: 2024-07-31 | End: 2024-07-31

## 2024-07-31 RX ORDER — BUPIVACAINE HYDROCHLORIDE AND EPINEPHRINE 2.5; 5 MG/ML; UG/ML
INJECTION, SOLUTION EPIDURAL; INFILTRATION; INTRACAUDAL; PERINEURAL AS NEEDED
Status: DISCONTINUED | OUTPATIENT
Start: 2024-07-31 | End: 2024-07-31 | Stop reason: HOSPADM

## 2024-07-31 RX ORDER — ONDANSETRON 4 MG/1
4 TABLET, FILM COATED ORAL EVERY 8 HOURS PRN
Qty: 20 TABLET | Refills: 0 | Status: SHIPPED | OUTPATIENT
Start: 2024-07-31

## 2024-07-31 RX ORDER — TRANEXAMIC ACID 10 MG/ML
1000 INJECTION, SOLUTION INTRAVENOUS ONCE
Status: COMPLETED | OUTPATIENT
Start: 2024-07-31 | End: 2024-07-31

## 2024-07-31 RX ORDER — PROPOFOL 10 MG/ML
INJECTION, EMULSION INTRAVENOUS CONTINUOUS PRN
Status: DISCONTINUED | OUTPATIENT
Start: 2024-07-31 | End: 2024-07-31

## 2024-07-31 RX ORDER — CEFAZOLIN SODIUM 2 G/50ML
2000 SOLUTION INTRAVENOUS EVERY 8 HOURS
Status: DISCONTINUED | OUTPATIENT
Start: 2024-07-31 | End: 2024-07-31 | Stop reason: HOSPADM

## 2024-07-31 RX ORDER — OXYCODONE HYDROCHLORIDE 5 MG/1
5 TABLET ORAL EVERY 4 HOURS PRN
Status: DISCONTINUED | OUTPATIENT
Start: 2024-07-31 | End: 2024-07-31 | Stop reason: HOSPADM

## 2024-07-31 RX ORDER — ASPIRIN 81 MG/1
81 TABLET, CHEWABLE ORAL 2 TIMES DAILY
Qty: 60 TABLET | Refills: 0 | Status: SHIPPED | OUTPATIENT
Start: 2024-07-31

## 2024-07-31 RX ORDER — ACETAMINOPHEN 325 MG/1
975 TABLET ORAL EVERY 8 HOURS
Status: DISCONTINUED | OUTPATIENT
Start: 2024-07-31 | End: 2024-07-31 | Stop reason: HOSPADM

## 2024-07-31 RX ORDER — OXYCODONE HYDROCHLORIDE 5 MG/1
5 TABLET ORAL EVERY 4 HOURS PRN
Qty: 42 TABLET | Refills: 0 | Status: SHIPPED | OUTPATIENT
Start: 2024-07-31 | End: 2024-08-10

## 2024-07-31 RX ORDER — CEFAZOLIN SODIUM 2 G/50ML
2000 SOLUTION INTRAVENOUS ONCE
Status: COMPLETED | OUTPATIENT
Start: 2024-07-31 | End: 2024-07-31

## 2024-07-31 RX ORDER — DEXAMETHASONE SODIUM PHOSPHATE 10 MG/ML
INJECTION, SOLUTION INTRAMUSCULAR; INTRAVENOUS AS NEEDED
Status: DISCONTINUED | OUTPATIENT
Start: 2024-07-31 | End: 2024-07-31

## 2024-07-31 RX ORDER — FENTANYL CITRATE/PF 50 MCG/ML
50 SYRINGE (ML) INJECTION
Status: DISCONTINUED | OUTPATIENT
Start: 2024-07-31 | End: 2024-07-31 | Stop reason: HOSPADM

## 2024-07-31 RX ORDER — SODIUM CHLORIDE, SODIUM LACTATE, POTASSIUM CHLORIDE, CALCIUM CHLORIDE 600; 310; 30; 20 MG/100ML; MG/100ML; MG/100ML; MG/100ML
125 INJECTION, SOLUTION INTRAVENOUS CONTINUOUS
Status: DISCONTINUED | OUTPATIENT
Start: 2024-07-31 | End: 2024-07-31 | Stop reason: HOSPADM

## 2024-07-31 RX ORDER — SODIUM CHLORIDE, SODIUM LACTATE, POTASSIUM CHLORIDE, CALCIUM CHLORIDE 600; 310; 30; 20 MG/100ML; MG/100ML; MG/100ML; MG/100ML
75 INJECTION, SOLUTION INTRAVENOUS CONTINUOUS
Status: DISCONTINUED | OUTPATIENT
Start: 2024-07-31 | End: 2024-07-31 | Stop reason: HOSPADM

## 2024-07-31 RX ORDER — MEPERIDINE HYDROCHLORIDE 25 MG/ML
12.5 INJECTION INTRAMUSCULAR; INTRAVENOUS; SUBCUTANEOUS
Status: DISCONTINUED | OUTPATIENT
Start: 2024-07-31 | End: 2024-07-31 | Stop reason: HOSPADM

## 2024-07-31 RX ORDER — PHENYLEPHRINE HCL IN 0.9% NACL 1 MG/10 ML
SYRINGE (ML) INTRAVENOUS AS NEEDED
Status: DISCONTINUED | OUTPATIENT
Start: 2024-07-31 | End: 2024-07-31

## 2024-07-31 RX ORDER — KETOROLAC TROMETHAMINE 30 MG/ML
INJECTION, SOLUTION INTRAMUSCULAR; INTRAVENOUS AS NEEDED
Status: DISCONTINUED | OUTPATIENT
Start: 2024-07-31 | End: 2024-07-31 | Stop reason: HOSPADM

## 2024-07-31 RX ORDER — LIDOCAINE HYDROCHLORIDE 10 MG/ML
INJECTION, SOLUTION EPIDURAL; INFILTRATION; INTRACAUDAL; PERINEURAL AS NEEDED
Status: DISCONTINUED | OUTPATIENT
Start: 2024-07-31 | End: 2024-07-31

## 2024-07-31 RX ORDER — CHLORHEXIDINE GLUCONATE 40 MG/ML
SOLUTION TOPICAL DAILY PRN
Status: DISCONTINUED | OUTPATIENT
Start: 2024-07-31 | End: 2024-07-31 | Stop reason: HOSPADM

## 2024-07-31 RX ORDER — ONDANSETRON 2 MG/ML
4 INJECTION INTRAMUSCULAR; INTRAVENOUS EVERY 6 HOURS PRN
Status: DISCONTINUED | OUTPATIENT
Start: 2024-07-31 | End: 2024-07-31 | Stop reason: HOSPADM

## 2024-07-31 RX ORDER — CEFADROXIL 500 MG/1
500 CAPSULE ORAL EVERY 12 HOURS SCHEDULED
Qty: 10 CAPSULE | Refills: 0 | Status: SHIPPED | OUTPATIENT
Start: 2024-07-31 | End: 2024-08-05

## 2024-07-31 RX ORDER — SENNA AND DOCUSATE SODIUM 50; 8.6 MG/1; MG/1
1 TABLET, FILM COATED ORAL DAILY
Qty: 14 TABLET | Refills: 0 | Status: SHIPPED | OUTPATIENT
Start: 2024-07-31

## 2024-07-31 RX ORDER — BUPIVACAINE HYDROCHLORIDE 7.5 MG/ML
INJECTION, SOLUTION INTRASPINAL AS NEEDED
Status: DISCONTINUED | OUTPATIENT
Start: 2024-07-31 | End: 2024-07-31

## 2024-07-31 RX ORDER — OXYCODONE HYDROCHLORIDE 5 MG/1
10 TABLET ORAL EVERY 4 HOURS PRN
Status: DISCONTINUED | OUTPATIENT
Start: 2024-07-31 | End: 2024-07-31 | Stop reason: HOSPADM

## 2024-07-31 RX ORDER — FENTANYL CITRATE 50 UG/ML
INJECTION, SOLUTION INTRAMUSCULAR; INTRAVENOUS AS NEEDED
Status: DISCONTINUED | OUTPATIENT
Start: 2024-07-31 | End: 2024-07-31

## 2024-07-31 RX ORDER — ONDANSETRON 2 MG/ML
4 INJECTION INTRAMUSCULAR; INTRAVENOUS ONCE AS NEEDED
Status: DISCONTINUED | OUTPATIENT
Start: 2024-07-31 | End: 2024-07-31 | Stop reason: HOSPADM

## 2024-07-31 RX ORDER — PROPOFOL 10 MG/ML
INJECTION, EMULSION INTRAVENOUS AS NEEDED
Status: DISCONTINUED | OUTPATIENT
Start: 2024-07-31 | End: 2024-07-31

## 2024-07-31 RX ORDER — MIDAZOLAM HYDROCHLORIDE 2 MG/2ML
INJECTION, SOLUTION INTRAMUSCULAR; INTRAVENOUS AS NEEDED
Status: DISCONTINUED | OUTPATIENT
Start: 2024-07-31 | End: 2024-07-31

## 2024-07-31 RX ORDER — CHLORHEXIDINE GLUCONATE ORAL RINSE 1.2 MG/ML
15 SOLUTION DENTAL ONCE
Status: COMPLETED | OUTPATIENT
Start: 2024-07-31 | End: 2024-07-31

## 2024-07-31 RX ADMIN — CHLORHEXIDINE GLUCONATE 0.12% ORAL RINSE 15 ML: 1.2 LIQUID ORAL at 06:27

## 2024-07-31 RX ADMIN — ONDANSETRON 4 MG: 2 INJECTION, SOLUTION INTRAMUSCULAR; INTRAVENOUS at 08:03

## 2024-07-31 RX ADMIN — Medication 100 MCG: at 08:12

## 2024-07-31 RX ADMIN — BUPIVACAINE HYDROCHLORIDE 15 ML: 2.5 INJECTION, SOLUTION EPIDURAL; INFILTRATION; INTRACAUDAL at 07:25

## 2024-07-31 RX ADMIN — SODIUM CHLORIDE, SODIUM LACTATE, POTASSIUM CHLORIDE, AND CALCIUM CHLORIDE 125 ML/HR: .6; .31; .03; .02 INJECTION, SOLUTION INTRAVENOUS at 06:34

## 2024-07-31 RX ADMIN — DEXAMETHASONE SODIUM PHOSPHATE 10 MG: 10 INJECTION INTRAMUSCULAR; INTRAVENOUS at 08:03

## 2024-07-31 RX ADMIN — TRANEXAMIC ACID 1000 MG: 10 INJECTION, SOLUTION INTRAVENOUS at 07:40

## 2024-07-31 RX ADMIN — BUPIVACAINE HYDROCHLORIDE 20 ML: 2.5 INJECTION, SOLUTION EPIDURAL; INFILTRATION; INTRACAUDAL at 07:25

## 2024-07-31 RX ADMIN — BUPIVACAINE 6 ML: 13.3 INJECTION, SUSPENSION, LIPOSOMAL INFILTRATION at 07:25

## 2024-07-31 RX ADMIN — PROPOFOL 120 MCG/KG/MIN: 10 INJECTION, EMULSION INTRAVENOUS at 07:50

## 2024-07-31 RX ADMIN — LIDOCAINE HYDROCHLORIDE 5 ML: 10 INJECTION, SOLUTION EPIDURAL; INFILTRATION; INTRACAUDAL; PERINEURAL at 07:48

## 2024-07-31 RX ADMIN — SODIUM CHLORIDE, SODIUM LACTATE, POTASSIUM CHLORIDE, AND CALCIUM CHLORIDE: .6; .31; .03; .02 INJECTION, SOLUTION INTRAVENOUS at 09:00

## 2024-07-31 RX ADMIN — ACETAMINOPHEN 975 MG: 325 TABLET, FILM COATED ORAL at 06:27

## 2024-07-31 RX ADMIN — CEFAZOLIN SODIUM 2000 MG: 2 SOLUTION INTRAVENOUS at 07:43

## 2024-07-31 RX ADMIN — PHENYLEPHRINE HYDROCHLORIDE 30 MCG/MIN: 10 INJECTION INTRAVENOUS at 07:51

## 2024-07-31 RX ADMIN — FENTANYL CITRATE 50 MCG: 50 INJECTION, SOLUTION INTRAMUSCULAR; INTRAVENOUS at 09:03

## 2024-07-31 RX ADMIN — PROPOFOL 80 MG: 10 INJECTION, EMULSION INTRAVENOUS at 07:49

## 2024-07-31 RX ADMIN — MIDAZOLAM 2 MG: 1 INJECTION INTRAMUSCULAR; INTRAVENOUS at 09:03

## 2024-07-31 RX ADMIN — BUPIVACAINE HYDROCHLORIDE IN DEXTROSE 1.5 ML: 7.5 INJECTION, SOLUTION SUBARACHNOID at 07:47

## 2024-07-31 RX ADMIN — Medication 100 MCG: at 08:41

## 2024-07-31 RX ADMIN — FENTANYL CITRATE 50 MCG: 50 INJECTION, SOLUTION INTRAMUSCULAR; INTRAVENOUS at 08:57

## 2024-07-31 NOTE — PLAN OF CARE
Problem: PHYSICAL THERAPY ADULT  Goal: Performs mobility at highest level of function for planned discharge setting.  See evaluation for individualized goals.  Description: Treatment/Interventions: Functional transfer training, LE strengthening/ROM, Elevations, Therapeutic exercise, Endurance training, Patient/family training, Equipment eval/education, Bed mobility, Gait training, Spoke to nursing, OT, Spoke to MD          See flowsheet documentation for full assessment, interventions and recommendations.  Note: Prognosis: Good  Problem List: Decreased strength, Decreased range of motion, Decreased endurance, Impaired balance, Decreased mobility, Obesity, Pain  Assessment: Patient is a 63y/o F POD 0 R TKA. Patient resides with spouse in a 2SH with a first floor setup and 2 curb steps to enter. She is independent at baseline without an AD and works. Current medical status includes obesity, pain, fall risk, telemetry, decreased ROM, strength, balance, endurance and mobility. Orthostatic vitals taken-stable. Patient completed bed mobility, transfers, amb and stairs all at a supervision level. She ambulated a household distance twice. She was thoroughly educated on proper use of RW, positioning of LE's in bed and use of ice. Patient will benefit from level 3 resources. Moderate intensity. The patient's AM-PAC Basic Mobility Inpatient Short Form Raw Score is 18. A Raw score of greater than 17 suggests the patient may benefit from discharge to home. Please also refer to the recommendation of the Physical Therapist for safe discharge planning.  Barriers to Discharge: None     Rehab Resource Intensity Level, PT: III (Minimum Resource Intensity)    See flowsheet documentation for full assessment.

## 2024-07-31 NOTE — ANESTHESIA PROCEDURE NOTES
Peripheral Block    Patient location during procedure: holding area  Reason for block: at surgeon's request and post-op pain management  Staffing  Performed by: Edward Godoy MD  Authorized by: Edward Godoy MD    Preanesthetic Checklist  Completed: patient identified, IV checked, site marked, risks and benefits discussed, surgical consent, monitors and equipment checked, pre-op evaluation and timeout performed  Peripheral Block  Patient position: supine  Prep: ChloraPrep  Patient monitoring: frequent blood pressure checks, continuous pulse oximetry and heart rate  Block type: IPACK  Laterality: right  Injection technique: single-shot  Procedures: ultrasound guided, Ultrasound guidance required for the procedure to increase accuracy and safety of medication placement and decrease risk of complications.  Ultrasound permanent image saved  bupivacaine (PF) (MARCAINE) 0.25 % injection 20 mL - Perineural   15 mL - 7/31/2024 7:25:00 AM  bupivacaine liposomal (EXPAREL) 1.3 % injection 20 mL - Perineural   6 mL - 7/31/2024 7:25:00 AM  Needle  Needle type: Stimuplex   Needle gauge: 20 G  Needle length: 4 in  Needle localization: anatomical landmarks and ultrasound guidance  Assessment  Injection assessment: incremental injection, frequent aspiration, injected with ease, negative aspiration, negative for heart rate change, no paresthesia on injection, no symptoms of intraneural/intravenous injection and needle tip visualized at all times  Paresthesia pain: none  Post-procedure:  site cleaned  patient tolerated the procedure well with no immediate complications          
Peripheral Block    Patient location during procedure: holding area  Start time: 7/31/2024 7:25 AM  Reason for block: at surgeon's request and post-op pain management  Staffing  Performed by: Edward Godoy MD  Authorized by: Edward Godoy MD    Preanesthetic Checklist  Completed: patient identified, IV checked, site marked, risks and benefits discussed, surgical consent, monitors and equipment checked, pre-op evaluation and timeout performed  Peripheral Block  Prep: ChloraPrep  Patient monitoring: frequent blood pressure checks, continuous pulse oximetry and heart rate  Anesthesia block type: genicular blocks (superomedial, superolateral, nerve to vastus intermedius)  Laterality: right  Injection technique: single-shot  Procedures: ultrasound guided, Ultrasound guidance required for the procedure to increase accuracy and safety of medication placement and decrease risk of complications.  Ultrasound permanent image saved  bupivacaine liposomal (EXPAREL) 1.3 % injection 20 mL - Perineural   8 mL - 7/31/2024 7:25:00 AM  bupivacaine (PF) (MARCAINE) 0.25 % injection 20 mL - Perineural   20 mL - 7/31/2024 7:25:00 AM  Needle  Needle type: Stimuplex   Needle gauge: 20 G  Needle length: 4 in  Needle localization: anatomical landmarks and ultrasound guidance  Assessment  Injection assessment: incremental injection, frequent aspiration, injected with ease, negative aspiration, negative for heart rate change, no paresthesia on injection, no symptoms of intraneural/intravenous injection and needle tip visualized at all times  Paresthesia pain: none  Post-procedure:  site cleaned  patient tolerated the procedure well with no immediate complications          
Peripheral Block    Patient location during procedure: holding area  Start time: 7/31/2024 7:25 AM  Reason for block: at surgeon's request and post-op pain management  Staffing  Performed by: dEward Godoy MD  Authorized by: Edward Godoy MD    Preanesthetic Checklist  Completed: patient identified, IV checked, site marked, risks and benefits discussed, surgical consent, monitors and equipment checked, pre-op evaluation and timeout performed  Peripheral Block  Prep: ChloraPrep  Patient monitoring: frequent blood pressure checks, continuous pulse oximetry and heart rate  Block type: Adductor Canal  Laterality: right  Injection technique: single-shot  Procedures: ultrasound guided, Ultrasound guidance required for the procedure to increase accuracy and safety of medication placement and decrease risk of complications.  Ultrasound permanent image saved  bupivacaine liposomal (EXPAREL) 1.3 % injection 20 mL - Perineural   6 mL - 7/31/2024 7:25:00 AM  bupivacaine (PF) (MARCAINE) 0.25 % injection 20 mL - Perineural   20 mL - 7/31/2024 7:25:00 AM  Needle  Needle type: Stimuplex   Needle gauge: 20 G  Needle length: 4 in  Needle localization: anatomical landmarks and ultrasound guidance  Assessment  Injection assessment: incremental injection, frequent aspiration, injected with ease, negative aspiration, negative for heart rate change, no paresthesia on injection, no symptoms of intraneural/intravenous injection and needle tip visualized at all times  Paresthesia pain: none  Post-procedure:  site cleaned  patient tolerated the procedure well with no immediate complications          
Spinal Block    Patient location during procedure: OR  Start time: 7/31/2024 7:47 AM  Reason for block: procedure for pain, at surgeon's request and primary anesthetic  Staffing  Performed by: Tracy Mccann CRNA  Authorized by: Edward Godoy MD    Preanesthetic Checklist  Completed: patient identified, IV checked, site marked, risks and benefits discussed, surgical consent, monitors and equipment checked, pre-op evaluation and timeout performed  Spinal Block  Patient position: sitting  Prep: ChloraPrep  Patient monitoring: heart rate, frequent blood pressure checks and continuous pulse ox  Approach: midline  Location: L3-4  Needle  Needle type: Pencan   Needle gauge: 24 G  Needle length: 4 in  Assessment  Sensory level: T4  Injection Assessment:  no paresthesia on injection and positive aspiration for clear CSF.  Post-procedure:  site cleaned  Additional Notes  Pt nixon procedure with out diff          
Vértigo postural paroxístico melissa    LO QUE NECESITA SABER:    El VPPB es velia afección de oído interno que provoca que de repente se sienta mareado. Melissa significa que no es grave o potencialmente mortal. El VPPB es causado por un problema con los nervios y la estructura del oído interno. El VPPB ocurre cuando pequeños trozos de calcio suelto se agrupan en jw de renu garcia del oído interno.     Anatomía del oído         INSTRUCCIONES SOBRE [facility]:    Regrese a la cory de emergencias si:  •Usted sufre velia caída hiwot un episodio de vértigo postural paroxístico melissa y se lastima.      •Usted tiene un dolor de arsenio intenso que no desaparece.      •Usted experimenta cambios recientes en la visión o se siente débil o confundido.      •Usted tiene problemas para oír, o tiene silbidos o zumbidos en renu oídos.      Comuníquese con carbajal médico si:  •Los síntomas del VPPB no desaparecen o pueden regresar.      •Usted tiene problemas de equilibrio o se  con frecuencia.      •Renu náuseas o vómitos empiezan de nuevo o empeoran.      •Usted se siente ansioso o deprimido y no quiere salir de carbajal hogar.      •Usted tiene preguntas o inquietudes acerca de carbajal condición o cuidado.      Medicamentos:  •Los medicamentospodrían ser recomendados o recetados para tratar mareos o náuseas.      •[place] renu medicamentos charisse se le haya indicado.Consulte con carbajal médico si usted sherwin que carbajal medicamento no le está ayudando o si presenta efectos secundarios. Infórmele si es alérgico a algún medicamento. Mantenga velia lista actualizada de los medicamentos, las vitaminas y los productos herbales que lavonne. Incluya los siguientes datos de los medicamentos: cantidad, frecuencia y motivo de administración. Traiga con usted la lista o los envases de las píldoras a renu citas de seguimiento. Lleve la lista de los medicamentos con usted en marnie de velia emergencia.      Prevenir los síntomas:  •Trate de evitar movimientos bruscos de la arsenio.Levántese y acuéstese lentamente.      •Eleve y apoye la arsenio cuando se acueste.Coloque almohadas debajo de la parte superior de carbajal espalda y de carbajal arsenio, o descanse en un reclinable.      •Cambie frecuentemente de posición cuando esté acostado.Trate de no acostarse sobre carbajal arsenio del mismo lado por largos periodos de tiempo. Ruede lentamente.      •Use equipo de protecciónal montar en bicicleta o practicar deportes. Un carl lo proteje contra lesiones en la arsenio.      Acuda a renu consultas de control con carbajal médico según le indicaron.Es posible que deba regresar en un mes para controlar el progreso de carbajal tratamiento. Anote renu preguntas para que se acuerde de hacerlas hiwot renu visitas.

## 2024-07-31 NOTE — ANESTHESIA POSTPROCEDURE EVALUATION
Post-Op Assessment Note    CV Status:  Stable  Pain Score: 0    Pain management: adequate    Multimodal analgesia used between 6 hours prior to anesthesia start to PACU discharge    Mental Status:  Alert and awake   Hydration Status:  Stable and euvolemic   PONV Controlled:  None   Airway Patency:  Patent  Two or more mitigation strategies used for obstructive sleep apnea   Post Op Vitals Reviewed: Yes    No anethesia notable event occurred.    Staff: CRNA               BP (P) 107/50 (07/31/24 0909)    Temp (P) 98 °F (36.7 °C) (07/31/24 0909)    Pulse (P) 90 (07/31/24 0909)   Resp (P) 12 (07/31/24 0909)    SpO2   94

## 2024-07-31 NOTE — PHYSICAL THERAPY NOTE
PHYSICAL THERAPY EVAL/TX  Physical Therapy Evaluation    Performed at least 2 patient identifiers during session:  Patient Active Problem List   Diagnosis    GERD without esophagitis    Hyperlipidemia    Other depressive disorder    Hallux rigidus    Lumbar foraminal stenosis    Lumbar radiculitis    Trigger middle finger of right hand    Trigger ring finger of right hand    History of colon polyps    Murmur, cardiac    Primary osteoarthritis of right knee       Past Medical History:   Diagnosis Date    Abnormal mammogram     RESOLVED 15AUG2017    Allergic     Seasonal    Arthritis     Colon polyp     Constipation     After surgery    Depression     GERD (gastroesophageal reflux disease)     diet managed, occasional    Hallux rigidus     LAST ASSESSED [id number] L grreat toe    Heart murmur From birth    Hyperlipidemia     Lyme disease     LAST ASSESSED [id number]    Murmur, cardiac     functional    Trigger finger     ,r hand ring finger and middle; L same fingers    Trigger middle finger of left hand 04/12/2022    Trigger ring finger of left hand 04/12/2022    Wears glasses        Past Surgical History:   Procedure Laterality Date    COLONOSCOPY      LAST ASSESSED [id number]    DILATION AND CURETTAGE OF UTERUS      ENDOMETRIAL ABLATION      ENDOMETRIAL CRYOABLATION      WITH ULTRASOUND GUIDE     IR EPIDURAL STEROID INJECTION      KNEE ARTHROSCOPY      b/l meniscus    HI ARTHRODESIS GREAT TOE METATARSOPHALANGEAL JOINT Left 1/18/2019    Procedure: GREAT TOE FUSION WITH PLATE;  Surgeon: Anthony Tristan DPM;  Location: [facility];  Service: Podiatry    HI ARTHRODESIS GREAT TOE METATARSOPHALANGEAL JOINT Right 11/18/2022    Procedure: ARTHRODESIS / FUSION FOOT;  Surgeon: Anthony Tristan DPM;  Location: [facility];  Service: Podiatry    HI TENDON SHEATH INCISION Left 4/29/2022    Procedure: RELEASE TRIGGER FINGER, LEFT MIDDLE AND RING;   "Surgeon: Ida Ron MD;  Location:  MAIN OR;  Service: Orthopedics    TN TENDON SHEATH INCISION Right 6/22/2022    Procedure: RELEASE TRIGGER FINGER, RIGHT MIDDLE AND RING FINGER;  Surgeon: Ida Ron MD;  Location:  MAIN OR;  Service: Orthopedics    TONSILLECTOMY AND ADENOIDECTOMY              07/31/24 1126   PT Last Visit   PT Visit Date 07/31/24   Note Type   Note type Evaluation   Pain Assessment   Pain Assessment Tool Knight-Baker FACES   Knight-Baker FACES Pain Rating 2   Pain Location/Orientation Orientation: Right;Location: [facility] Pain Intervention(s) Medication (See MAR);Repositioned;Ambulation/increased activity   Restrictions/Precautions   Weight Bearing Precautions Per Order Yes   RLE Weight Bearing Per Order WBAT   Other Precautions Pain;Fall Risk;Telemetry;Multiple lines;WBS   Home Living   Type of Home House   Home Layout Two level;Able to live on main level with bedroom/bathroom  (1+1 AIDA. Full bath on first.)   Bathroom Shower/Tub Walk-in shower   Bathroom Equipment Grab bars in shower;Shower chair  (toilet raiser)   Home Equipment Walker   Additional Comments Does not use an AD at baseline.   Prior Function   Level of [place] Independent with ADLs;Independent with functional mobility;Independent with IADLS   Lives With Spouse   IADLs Independent with driving;Independent with meal prep;Independent with medication management   Falls in the last 6 months 0   Vocational Full time employment   General   Family/Caregiver Present Yes   Cognition   Overall Cognitive Status WFL   Arousal/Participation Alert   Orientation Level Oriented X4   Memory Within functional limits   Following Commands Follows all commands and directions without difficulty   Subjective   Subjective \"I've heard the pain can be bad for a few weeks.\"   LLE Assessment   LLE Assessment WFL   Bed Mobility   Supine to Sit 5  Supervision   Additional items HOB elevated   Additional Comments BP taken in supine, sitting and " standing. Stable.   Transfers   Sit to Stand 5  Supervision   Additional items Armrests;Verbal cues  (Verbal and tactile cues for hand placement)   Stand to Sit 5  Supervision   Additional items Armrests;Verbal cues   Additional Comments Patient also completed a toilet transfer during session with supervision.   Ambulation/Elevation   Gait pattern Antalgic;Decreased R stance;Step to   Gait Assistance 5  Supervision   Additional items Verbal cues  (Verbal cues for sequencing.)   Assistive Device Rolling walker   Distance 50ft   Ambulation/Elevation Additional Comments Ambulated from bed to bathroom. Refer to treatment session for additional mobility   Balance   Static Sitting Normal   Dynamic Sitting Good   Static Standing Fair +   Dynamic Standing Fair +   Ambulatory Fair +   Endurance Deficit   Endurance Deficit Yes   Endurance Deficit Description Limited by pain   Activity Tolerance   Activity Tolerance Patient limited by pain   Medical Staff Made Aware Dimple MALDONADO. Dr. Szymanski-made aware of functional status   Nurse Made Aware RNRiki   Assessment   Prognosis Good   Problem List Decreased strength;Decreased range of motion;Decreased endurance;Impaired balance;Decreased mobility;Obesity;Pain   Assessment Patient is a 65y/o F POD 0 R TKA. Patient resides with spouse in a 2SH with a first floor setup and 2 curb steps to enter. She is independent at baseline without an AD and works. Current medical status includes obesity, pain, fall risk, telemetry, decreased ROM, strength, balance, endurance and mobility. Orthostatic vitals taken-stable. Patient completed bed mobility, transfers, amb and stairs all at a supervision level. She ambulated a household distance twice. She was thoroughly educated on proper use of RW, positioning of LE's in bed and use of ice. Patient will benefit from level 3 resources. Moderate intensity. The patient's AM-PAC Basic Mobility Inpatient Short Form Raw Score is 18. A Raw score of greater  than 17 suggests the patient may benefit from discharge to home. Please also refer to the recommendation of the Physical Therapist for safe discharge planning.   Barriers to Discharge None   Goals   Patient Goals To go home today   STG Expiration Date 08/14/24   Short Term Goal #1 1. perform supine<>sit with HOB flat without use of bedrails ind 2. perform sit<>stand transfers mod I 3. ambulate 300ft with a RW mod I level 4. Ascend/descend 2 curb steps with RW mod I level   PT Treatment Day 1   Plan   Treatment/Interventions Functional transfer training;LE strengthening/ROM;Elevations;Therapeutic exercise;Endurance training;Patient/family training;Equipment eval/education;Bed mobility;Gait training;Spoke to nursing;OT;Spoke to MD   PT Frequency Twice a day   Discharge Recommendation   Rehab Resource Intensity Level, PT III (Minimum Resource Intensity)   Additional Comments Owns a RW- reviewed how to size properly   AM-PAC Basic Mobility Inpatient   Turning in Flat Bed Without Bedrails 3   Lying on Back to Sitting on Edge of Flat Bed Without Bedrails 3   Moving Bed to Chair 3   Standing Up From Chair Using Arms 3   Walk in Room 3   Climb 3-5 Stairs With Railing 3   Basic Mobility Inpatient Raw Score 18   Basic Mobility Standardized Score 41.05   [facility] Highest Level Of Mobility   -HLM Goal 6: Walk 10 steps or more   JH-HLM Achieved 7: Walk 25 feet or more   Additional Treatment Session   Start Time 1116   End Time 1126   Treatment Assessment Ambulated an additional 50ft with a RW with supervision. Step to pattern, decreased stance on the RLE. Antalgic gait. Patient with increased distance from RW at times-verbal cues provided. Also reporting twice that knee felt like it slightly buckled. No LOB. Patient ascended/descended 1 curb step with RW with supervision. Verbal cues for proper technique. Spouse to assist. Stand to sit with supervision.   Equipment Use RW   End of Consult   Patient Position at End of  Consult Seated edge of bed;All needs within reach  (OT present with patient)     Ana Mercedes, PT             Patient Name: Arcelia Rivas  Today's Date: 7/31/2024

## 2024-07-31 NOTE — DISCHARGE INSTR - AVS FIRST PAGE
Dr. Szymanski Knee Replacement    What to Expect/Activity  It is normal to have some discomfort in your knee for several days to weeks.  You are weight bearing as tolerated to your operative leg with assist devices.  Please use crutches/walker when ambulating until your follow-up  Swelling and discomfort in the knee is normal for several days after surgery. For the first 2-3 days, use ice around the knee to help. Use for 20-30 minutes every 1-2 hours for 48 hours, while awake. You may continue beyond 48 hours as needed.  Place one or two pillows underneath your calf, not your knee, to reduce swelling.  Physical therapy on your own at home should start as soon as possible (see below). Please perform heel slides and extension exercises on your own as well (see diagram).  Please use incentive spirometer 10 times per hour while awake (see diagram).    Dressing/Wound Care/Bathing  You may remove your toe-to-groin dressing 24 hours after surgery. There will be a surgical dressing over your incision that stays in place until follow-up unless water gets under the bandage and then it should be removed.   You may start showering 24 hours after surgery, the surgical dressing will remain in place. Please pat the dressing dry. If you notice the dressing appears saturated or is starting to come off, please replace with dry dressing.  You can keep the dressing in place until follow-up in the office.   Do not place any creams, ointments or gels on or around the incision.  No baths, swimming or submerging until cleared by Dr. Szymanski    Pain Management/Medications  You may resume your usual medications.  Please take the following medications:  Anti-coagulation (blood clot prevention) - ASA 81 mg for 4 weeks  Pain medication:  Narcotic: Take as directed  NSAID/Anti-inflammatory: Take as directed  Tylenol 1000mg every 8 hours  Zofran (ondasetron) - 4mg every 8 hours as needed for nausea  Stool softeners (senna/colace) - take daily to  prevent constipation as narcotic pain medication causes constipation  Antibiotic - take as directed if prescribed   If you have questions or pain concerns, please contact the office. Pain medication cannot remove all post-operative pain.    Follow up/Call if:  The findings of your surgery will be explained to you and your family immediately after surgery. However, in the post-operative period, during recovery from anesthesia you may not fully remember or fully understand what was said. This will be again gone over when you return for your post-op appointment.  Please contact Dr. Szymanski's office if you experience the following:  Excessive bleeding (bleeding through your dressing)  Fever greater than 101 degrees F after 48 hours (low grade fevers the day or two after surgery are normal)  Persistent nausea or vomiting  Decreased sensation or discoloration of the operative limb  Pain or swelling that is getting worse and not better with medication    Dr. Szymanski's Office Contact: [phone]

## 2024-07-31 NOTE — OP NOTE
OPERATIVE REPORT  PATIENT NAME: Arcelia Rivas  : 1960  MRN: [id number]  Pt Location:  [facility] OR ROOM 02    Surgery Date: 2024    Surgeons and Role:     * Tripp Szymanski, DO - Primary     * LIGIA Bauman-C - Assisting      * Dimple Moseley OT-C - Assisting     Preop Diagnosis:  Primary osteoarthritis of right knee [M17.11]    Post-Op Diagnosis Codes:     * Primary osteoarthritis of right knee [M17.11]    Procedure(s):  Right - ARTHROPLASTY KNEE TOTAL- SAME DAY    Specimens:  * No specimens in log *    Estimated Blood Loss:   25 cc    Drains:  * No LDAs found *    Anesthesia Type:   Spinal      Operative Indications:  Primary osteoarthritis of right knee [M17.11]    64F with right knee pain 2/2 severe osteoarthritis. She has failed extensive non-operative management including HEP, PT, IA injections and weight loss. Her pain continues to limit her ADLs and conservative measures are lasting a shorter period of time. The patient has elected to proceed with right TKA. Risks and benefits of surgery to include but not limited to bleeding, infection, damage to surrounding structures, hardware failure, instability, fracture, dislocation, need for further surgery, continued pain, stiffness, blood clots, stroke, and heart attack was discussed with the patient.     Operative Findings:  Severe osteoarthritis   Pre-op ROM 5-115  Post-op ROM 0-125+ calf to thigh gravity-assisted flexion    Implant Name Type Inv. Item Serial No.  Lot No. LRB No. Used Action   CEMENT BONE SMART SET GRAY MED VISC - [id number]  CEMENT BONE SMART SET GRAY MED VISC  DEPUY [id number] Right 2 Implanted   COMPONENT FEM SZ 5 NRW RT CMNT CR ATTUNE - [id number]  COMPONENT FEM SZ 5 NRW RT CMNT CR ATTUNE  DEPUY [id number] Right 1 Implanted   INSERT TIB SZ 5 6MM RT MED STAB ATTUNE - [id number]  INSERT TIB SZ 5 6MM RT MED STAB ATTUNE  DEPUY [id number] Right 1 Implanted   COMPONENT PATELLA 35MM MEDIAL DOME ATTUNE - [id number]  COMPONENT PATELLA  35MM MEDIAL DOME ATTUNE  DEPUY [id number] Right 1 Implanted   BASEPLATE TIBIAL SZ 4 CMNT FX BRNG ATTUNE S PLUS - [id number]  BASEPLATE TIBIAL SZ 4 CMNT FX BRNG ATTUNE S PLUS  DEPUY [id number] Right 1 Implanted     Complications:   None    Knee Technique: Suture (direct) Repair  Knee Approach: Medial Parapatellar    Procedure and Technique:  Patient was seen in the preoperative holding area.  Informed consent was confirmed and all questions were answered. Operative site was confirmed and marked. Patient was taken to the operating room and transferred to the operating room table. Anesthesia was performed as above. The patient was then placed supine and all bony prominences were well-padded. Right lower extremity was prepped and draped in usual sterile fashion with chlorhexidine scrub.  Patient was given perioperative antibiotics prior to incision and SCDs were placed on the non-operative leg.  A formal time-out was performed identifying the patient and confirmed operative site.  The knee was exsanguinated and a pneumatic tourniquet was inflated at 250 mmHg.  A slightly medial midline incision was performed from 3 fingerbreadths above the patella to the tibial tubercle.  This incision was carried down through skin and subcutaneous tissues to the level of the extensor mechanism.  A small medial skin flap was created.  A medial parapatellar approach was performed into the knee being careful to avoid the patellar tendon.  The anterior horn of the medial and lateral meniscus was released.  The medial peel was performed to the mid coronal line.  Lateral patellofemoral plica was excised and the patella was everted.  The fat pad was removed being careful to avoid the patellar tendon.  Peripheral osteophytes removed at this time as was the anterior cruciate ligament.  The intramedullary femoral drill was now used just medial and anterior to the PCL insertion at the sulcus terminalis.  A drop homar was used to confirm intramedullary  placement of the drill.  The distal femoral cutting jig was now inserted into the intramedullary canal set to 5° of valgus per pre-op template and 9 mm distal resection.  Distal resection was checked with an Omar wing and deemed appropriate.  The distal femur was cut.  At this time the distal femoral cutting guide was removed and the sizing guide was placed on the distal femur.  A posterior referencing system was used and pins placed with 3° of external rotation.  The femur was sized to the above size.  The appropriate cutting block was then placed over the pins and rotation was confirmed being parallel to the epicondylar access and perpendicular to Whitesides line.  Retractors were placed to protect the collateral ligaments and the anterior, posterior, posterior chamfer, anterior chamfer was cut. The distal 5th cut was also performed.  Bone fragments were removed with curved osteotome and then posterior Hohmann was placed to sublux the tibia forward.  An extramedullary tibial guide was used veing cognizant of varus valgus alignment, slope and depth of resection.  The guide was pinned in place and then a drop homar was used to confirm appropriate alignment.  The tibia was cut and removed.  At this time the bilateral menisci and posterior osteophytes of the femur were resected with the use of a laminar .  Once adequate osteophytes were removed the knee was trialed with the above implants.  The knee was found to have excellent stability with a 6 mm MS insert.  At this time the patella was measured and resected to appropriate depth.  The patella sized to the above size and 3 drill holes were performed.  At this time the knee was again taken through range of motion and found to have excellent stability and excellent patellar tracking.  The trials were floated and rotation of tibia marked.  The femoral lug drills were drilled.  The femur and trial poly were removed and posterior Hohmann placed to sublux the tibia  forward.  The cemented tibial base plate was then aligned on the cut surface of the tibia matching where the trial was floated at approximately the medial 1/3 the tibial tubercle.  The base plate was pinned in place and prep tower impacted.  The Boss Reamer for the cemented tibia was used 1st followed by keel punch.  At this time all trials were removed and the knee was copiously irrigated with normal saline solution.  The cemented base plate was impacted into place and assured to be flush and all excess cement was cleared.  The MS polyethylene was impacted into the clean tray. The posterior Hohmann was then removed.  The cemented femoral component was impacted in place and assured to be flush on all bony surfaces. Peripheral cement was removed. The patella cut surface was dried and the domed patella was cemented into place and held with a clamp.  Peripheral cement was cleared and the clamp was held until cement cured.  The tourniquet was released at 30 minutes and all bleeders were coagulated.  The knee was irrigated with Irrisept solution and then the remainder of the 3 L of normal saline solution.  The joint local was injected in the posterior capsule and around the tissues of the knee.  The knee was taken through a final range of motion and found to have excellent stability and patellar tracking.  All instrument and sponge counts were correct x2.  The arthrotomy was closed with #1 Stratafix suture.  Subcutaneous tissues were closed with 2-0 Vicryl.  The skin was closed with 3-0 Stratafix followed by Dermabond.  A sterile Mepilex was placed along with a thigh foot Ace wrap.  Patient was awoken from anesthesia and taken to recovery room in stable condition.      64F s/p R TKA 7/31  - multi-modal pain control  - ancef x 24 hrs, duricef x 5 days as planned same day discharge   - DVT ppx: aspirin 81mg BID x 4 weeks  - PT/OT  - WBAT  - ROM as tolerated, pillow/blankets under achilles not behind knee while in bed  -  f/u 10-14 days      I was present for the entire procedure., A qualified resident physician was not available., and A physician assistant was required during the procedure for retraction, tissue handling, dissection and suturing.    Patient Disposition:  PACU       SIGNATURE: Tripp Szymanski DO  DATE: July 31, 2024  TIME: 9:10 AM

## 2024-07-31 NOTE — OCCUPATIONAL THERAPY NOTE
Occupational Therapy Evaluation      Arcelia Rivas    7/31/2024    Active Problems:  There are no active Hospital Problems.      Past Medical History:   Diagnosis Date    Abnormal mammogram     RESOLVED 15AUG2017    Allergic     Seasonal    Arthritis     Colon polyp     Constipation     After surgery    Depression     GERD (gastroesophageal reflux disease)     diet managed, occasional    Hallux rigidus     LAST ASSESSED 21JUL2015 L grreat toe    Heart murmur From birth    Hyperlipidemia     Lyme disease     LAST ASSESSED [id number]    Murmur, cardiac     functional    Trigger finger     ,r hand ring finger and middle; L same fingers    Trigger middle finger of left hand 04/12/2022    Trigger ring finger of left hand 04/12/2022    Wears glasses        Past Surgical History:   Procedure Laterality Date    COLONOSCOPY      LAST ASSESSED [id number]    DILATION AND CURETTAGE OF UTERUS      ENDOMETRIAL ABLATION      ENDOMETRIAL CRYOABLATION      WITH ULTRASOUND GUIDE     IR EPIDURAL STEROID INJECTION      KNEE ARTHROSCOPY      b/l meniscus    LA ARTHRODESIS GREAT TOE METATARSOPHALANGEAL JOINT Left 1/18/2019    Procedure: GREAT TOE FUSION WITH PLATE;  Surgeon: Anthony Tristan DPM;  Location: [facility];  Service: Podiatry    LA ARTHRODESIS GREAT TOE METATARSOPHALANGEAL JOINT Right 11/18/2022    Procedure: ARTHRODESIS / FUSION FOOT;  Surgeon: Anthony Tristan DPM;  Location: [facility];  Service: Podiatry    LA TENDON SHEATH INCISION Left 4/29/2022    Procedure: RELEASE TRIGGER FINGER, LEFT MIDDLE AND RING;  Surgeon: Ida Ron MD;  Location: [facility];  Service: Orthopedics    LA TENDON SHEATH INCISION Right 6/22/2022    Procedure: RELEASE TRIGGER FINGER, RIGHT MIDDLE AND RING FINGER;  Surgeon: Ida Ron MD;  Location:  MAIN OR;  Service: Orthopedics    TONSILLECTOMY AND ADENOIDECTOMY          07/31/24 1127   OT Last Visit   OT Visit Date 07/31/24   Note Type   Note type Evaluation   Pain Assessment   Pain  "Assessment Tool Knight-Giles FACES   Knight-Baker FACES Pain Rating 2   Pain Location/Orientation Orientation: Right;Location: Knee   Hospital Pain Intervention(s) Repositioned;Ambulation/increased activity   Restrictions/Precautions   Weight Bearing Precautions Per Order Yes   RLE Weight Bearing Per Order WBAT   Other Precautions Pain;Fall Risk;Telemetry;Multiple lines;WBS   Home Living   Type of Home House   Home Layout Two level;Able to live on main level with bedroom/bathroom  (1+1 AIDA; full bath on first)   Bathroom Shower/Tub Walk-in shower   Bathroom Equipment Grab bars in shower;Shower chair  (toilet raiser)   Home Equipment Walker  (was not using at baseline)   Prior Function   Level of [place] Independent with ADLs;Independent with functional mobility;Independent with IADLS   Lives With Spouse   IADLs Independent with driving;Independent with meal prep;Independent with medication management   Falls in the last 6 months 0   Vocational Full time employment   General   Family/Caregiver Present Yes  (spouse)   Subjective   Subjective \"I feel good\"   ADL   Eating Assistance 7  Independent   Grooming Assistance 7  Independent   UB Bathing Assistance 6  Modified Independent   LB Bathing Assistance 6  Modified Independent   UB Dressing Assistance 7  Independent   LB Dressing Assistance 6  Modified independent   Toileting Assistance  6  Modified independent   Bed Mobility   Supine to Sit 5  Supervision   Additional Comments BP stable throughout   Transfers   Sit to Stand 5  Supervision   Additional items Verbal cues   Stand to Sit 5  Supervision   Additional items Verbal cues   Stand pivot 5  Supervision   Additional items Verbal cues  (RW)   Toilet transfer 5  Supervision   Additional items Standard toilet   Functional Mobility   Functional Mobility 5  Supervision   Additional items Rolling walker   Activity Tolerance   Activity Tolerance Patient limited by pain   Medical Staff Made Aware PT MD Stephon Hawkins "   Nurse Made Aware KEZIA Lyn   RUE Assessment   RUE Assessment WFL   LUE Assessment   LUE Assessment WFL   Cognition   Overall Cognitive Status WFL   Arousal/Participation Alert;Cooperative   Attention Within functional limits   Orientation Level Oriented X4   Memory Within functional limits   Following Commands Follows all commands and directions without difficulty   Assessment   Prognosis Good   Assessment Pt is a 64 y.o. female seen for OT evaluation at [facility], admitted 7/31/2024 w/ POD 0 R TKA.  OT completed extensive review of pt's medical and social history. Comorbidities affecting pt's functional performance at time of assessment include: depression, cardiac murmur, OA R knee, trigger finger R hand, lumbar radiculitis. Prior to admission, pt was living 2SH, 1st fl setup, with spouse, and was independent with ADL/IADL. Upon evaluation, pt presents to OT at functional baseline. The patient's raw score on the AM-PAC Daily Activity inpatient short form is 24, standardized score is 57.54, greater than 39.4. Patients at this level are likely to benefit from DC to home. Based on findings, pt is of high complexity, due to medical comorbidities. Pt seen as a co-eval with PT due to the patient's co-morbidities, clinically unstable presentation, and present impairments which are a regression from the patient's baseline. At this time, OT recommendations at time of discharge are level 4. No further acute OT needs indicated at this time - Recommend pt continue to be OOB for meals, ambulation to/from BR, perform self care tasks, and mobility in hallway with nursing. D/C from OT caseload with above recommendations.   Goals   Patient Goals go home   Plan   OT Frequency Eval only   Discharge Recommendation   Rehab Resource Intensity Level, OT No post-acute rehabilitation needs   AM-PAC Daily Activity Inpatient   Lower Body Dressing 4   Bathing 4   Toileting 4   Upper Body Dressing 4   Grooming 4   Eating 4   Daily  Activity Raw Score 24   Daily Activity Standardized Score (Calc for Raw Score >=11) 57.54   AM-PAC Applied Cognition Inpatient   Following a Speech/Presentation 4   Understanding Ordinary Conversation 4   Taking Medications 4   Remembering Where Things Are Placed or Put Away 4   Remembering List of 4-5 Errands 4   Taking Care of Complicated Tasks 4   Applied Cognition Raw Score 24   Applied Cognition Standardized Score 62.21     Maame Whiteside MS, OTR/L

## 2024-08-01 ENCOUNTER — TELEPHONE (OUTPATIENT)
Dept: OBGYN CLINIC | Facility: HOSPITAL | Age: 64
End: 2024-08-01

## 2024-08-01 NOTE — TELEPHONE ENCOUNTER
Patient contacted for a postoperative follow up assessment. Patient states current pain level of a 3/10 when sitting and 4/10 when walking with RW. Patient reports increase in swelling and dressing is Dressing C/D/I Patient is icing the site regularly. NN educated patient on post-op bruising, swelling, and icing. PT 8/5 at 11AM.     We reviewed patients AVS medication list. Patient is taking Tylenol 1000mg every 8 hours, Oxycodone 5mg PRN, ASA 81mg BID, Senokot once daily, and Cefadroxil 500mg BID..Patient has not had a BM but ispassing gas.       Patient denies nausea, vomiting, abdominal pain, chest pain, shortness of breath, fever, dizziness, and calf pain Patient confirmed post-op appointment with surgeon on 8/16 at 10:45AM .Patient does not have any other questions or concerns at this time. Pt was encouraged to call with any questions, concerns or issues.  .

## 2024-08-05 ENCOUNTER — OFFICE VISIT (OUTPATIENT)
Dept: PHYSICAL THERAPY | Facility: CLINIC | Age: 64
End: 2024-08-05
Payer: COMMERCIAL

## 2024-08-05 DIAGNOSIS — M17.11 PRIMARY OSTEOARTHRITIS OF RIGHT KNEE: Primary | ICD-10-CM

## 2024-08-05 PROCEDURE — 97110 THERAPEUTIC EXERCISES: CPT | Performed by: PHYSICAL THERAPIST

## 2024-08-05 PROCEDURE — 97140 MANUAL THERAPY 1/> REGIONS: CPT | Performed by: PHYSICAL THERAPIST

## 2024-08-05 PROCEDURE — 97164 PT RE-EVAL EST PLAN CARE: CPT | Performed by: PHYSICAL THERAPIST

## 2024-08-05 NOTE — PROGRESS NOTES
PT Re-Evaluation     Today's date: 2024  Patient name: Arcelia Rivas  : 1960  MRN: [id number]  Referring provider: Margret Cunha PA-C  Dx:   Encounter Diagnosis     ICD-10-CM    1. Primary osteoarthritis of right knee  M17.11                        Assessment  Impairments: abnormal gait, abnormal muscle firing, abnormal or restricted ROM, activity intolerance, impaired balance, impaired physical strength, lacks appropriate home exercise program, pain with function and weight-bearing intolerance    Assessment details: Arcelia Rivas is a 64 y.o. female presenting to physical therapy s/p right total knee arthroplasty performed by Dr. Szymanski on 24. S Patient presents with pain, decreased strength, decreased range of motion, decreased joint mobility and decreased tolerance to activity. Due to these impairments patient has difficulty performing activities of daily living, recreational activities and engaging in social activities. Patient would benefit from skilled physical therapy services to address these impairments and to maximize function. Thank you for the referral.   Understanding of Dx/Px/POC: excellent     Prognosis: excellent    Goals  Impairment Goals:  1.) Pt will have decreased sx at rest by 50% in 2-4 weeks post-operatively.  2.) Pt will have improved knee range of motion to 120 degrees flexion and 0 degrees extension in 3-4 weeks post-operatively.  3.) Pt will have improved knee strength by 1/2 MMT in 4-6 weeks post-operatively.  4.) Pt will have decreased tenderness to palpation to medial and lateral joint line in 3-4 weeks post-operatively.    Functional Goals:  1.) Pt will be independent in their home exercise program in 1 week post-operatively.  2.) Pt will be able to traverse stairs without pain in 3-4 weeks post-operatively.  3.) Pt will be able to walk for 10 minutes without pain in 3-4 weeks post-operatively.  4.) Pt will have an improved FOTO score of 75/100 in 6-8 weeks  post-operatively.      Plan  Patient would benefit from: skilled PT    Planned therapy interventions: joint mobilization, manual therapy, patient education, therapeutic exercise, gait training, flexibility, home exercise program, behavior modification, neuromuscular re-education, graded activity, graded exercise, activity modification, therapeutic activities, strengthening and stretching    Frequency: 2x week  Duration in weeks: 8  Plan of Care beginning date: 2024  Plan of Care expiration date: 2024      Subjective Evaluation    History of Present Illness  Mechanism of injury: Arcelia Rivas is a 64 y.o. female presenting to physical therapy s/p right total knee arthroplasty performed by Dr. Szymanski on 24. She notes that surgery went well and getting around at home seems to be good as well, her daughter is helping out. She ices multiple times a day, has been performing her HEP and staying ahead of her pain with medication. She has been getting some sleep in the recliner.    Pre-op Eval: She explains that she had a R menisectomy about 15 years ago. 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     She will have help at home, notes her bed and bath is on first floor. 2 single steps to enter the home and. 2 dogs at home which she can let outside in a fenced yard. Her daughter lives next door and is a physical therapist. She is equipped with all necessary assistive devices and chair modifications and all pre-operative questions were answered to patients satisfaction.     She wants to get back to gardening, playing with grandchildren, walking without limitations in the knee.       Patient Goals  Patient goals for therapy: decreased pain, increased motion, return to sport/leisure activities and increased strength    Pain  Current pain ratin  At best pain rating: 3  At worst pain ratin  Quality: dull ache and tight  Relieving factors:  medications and ice  Aggravating factors: standing, walking, stair climbing and running        Objective     Active Range of Motion   Left Knee   Flexion: 130 degrees   Extension: 0 degrees     Right Knee   Flexion: 103 degrees   Extension: -2 degrees     Swelling     Right Knee Girth Measurement (cm)   Joint line: 5810 cm  10 cm above joint line: 52 cm  10 cm below joint line: 46 cm    Ambulation   Weight-Bearing Status   Weight-Bearing Status (Right): weight-bearing as tolerated    Assistive device used: front-wheeled walker    Observational Gait   Increased right swing time. Decreased walking speed and right stance time.              Precautions: s/p R TKA 7/31       Manuals 7/24 8/5           PROM R Knee  RN                                                  Neuro Re-Ed                                                                                                        Ther Ex             Quad sets  10x5''           Glute sets  10x5''           Ankle pumps  2x10           Seated knee flexion  2x10x5''           Supine heel slides  10x5'' straps           Bridges   2x10                                     Ther Activity                                       Gait Training             RW  5'                        Modalities

## 2024-08-08 ENCOUNTER — OFFICE VISIT (OUTPATIENT)
Dept: PHYSICAL THERAPY | Facility: CLINIC | Age: 64
End: 2024-08-08
Payer: COMMERCIAL

## 2024-08-08 DIAGNOSIS — M17.11 PRIMARY OSTEOARTHRITIS OF RIGHT KNEE: Primary | ICD-10-CM

## 2024-08-08 PROCEDURE — 97110 THERAPEUTIC EXERCISES: CPT | Performed by: PHYSICAL THERAPIST

## 2024-08-08 PROCEDURE — 97140 MANUAL THERAPY 1/> REGIONS: CPT | Performed by: PHYSICAL THERAPIST

## 2024-08-08 NOTE — PROGRESS NOTES
Daily Note     Today's date: 2024  Patient name: Arcelia Rivas  : 1960  MRN: [id number]  Referring provider: Margret Cunha PA-C  Dx: No diagnosis found.               Subjective: Arcelia explains that she slept well last night and things are improving daily.      Objective: See treatment diary below      Assessment: Tolerated treatment well. Patient demonstrated fatigue post treatment. Able to achieve 120 degrees flexion and full extension passively. She was able to achieve partial LAQ, also added in standing mini squats and heel raises with cueing to correct weight shifting. Also educated in single point cane gait training, continue to progress nv.      Plan: Continue per plan of care.      Precautions: s/p R TKA        Manuals           PROM R Knee  RN RN                                                 Neuro Re-Ed                                                                                                        Ther Ex             Quad sets  10x5'' 10x5''          Glute sets  10x5''           Ankle pumps  2x10           Seated knee flexion  2x10x5''           Supine heel slides  10x5'' straps 2x10x5'' w/ straps          Bridges   2x10 2x10          SLR   X5 w/ assist          HR/TR   2x10          Mini Squats   2x10                                                              Ther Activity                                       Gait Training             RW  5'           SPC   5'          Modalities

## 2024-08-12 ENCOUNTER — OFFICE VISIT (OUTPATIENT)
Dept: PHYSICAL THERAPY | Facility: CLINIC | Age: 64
End: 2024-08-12
Payer: COMMERCIAL

## 2024-08-12 DIAGNOSIS — M17.11 PRIMARY OSTEOARTHRITIS OF RIGHT KNEE: Primary | ICD-10-CM

## 2024-08-12 PROCEDURE — 97110 THERAPEUTIC EXERCISES: CPT | Performed by: PHYSICAL THERAPIST

## 2024-08-12 PROCEDURE — 97140 MANUAL THERAPY 1/> REGIONS: CPT | Performed by: PHYSICAL THERAPIST

## 2024-08-12 NOTE — PROGRESS NOTES
Daily Note     Today's date: 2024  Patient name: Arcelia Rivas  : 1960  MRN: [id number]  Referring provider: Margret Cunha PA-C  Dx:   Encounter Diagnosis     ICD-10-CM    1. Primary osteoarthritis of right knee  M17.11                      Subjective: Arcelia explains that she was a little sore after doing more over the weekend but this subsided within 24 hours.       Objective: See treatment diary below      Assessment: Tolerated treatment well. Patient demonstrated fatigue post treatment and exhibited good technique with therapeutic exercises. Continued education on form and technique during single point cane use, heel to toe technique reviewed. Practiced step ups as well as eccentric up and overs today with cueing to correct form.       Plan: Continue per plan of care.      Precautions: s/p R TKA        Manuals          PROM R Knee  RN RN RN                                                Neuro Re-Ed                                                                                                        Ther Ex             Quad sets  10x5'' 10x5''          Glute sets  10x5''           Ankle pumps  2x10           Seated knee flexion  2x10x5''           Supine heel slides  10x5'' straps 2x10x5'' w/ straps          Bridges   2x10 2x10 2x10         SLR   X5 w/ assist 2x10 min assist         HR/TR   2x10 2x10         Mini Squats   2x10 2x10         Bike (Cardiovascular Endurance)    5' lvl 0                                                Ther Activity             Step ups    2x10 lvl 2          Step up and overs    2x10 lvl 1         Gait Training             RW  5'           SPC   5' 5'         Modalities

## 2024-08-15 ENCOUNTER — OFFICE VISIT (OUTPATIENT)
Dept: PHYSICAL THERAPY | Facility: CLINIC | Age: 64
End: 2024-08-15
Payer: COMMERCIAL

## 2024-08-15 DIAGNOSIS — M17.11 PRIMARY OSTEOARTHRITIS OF RIGHT KNEE: Primary | ICD-10-CM

## 2024-08-15 PROCEDURE — 97140 MANUAL THERAPY 1/> REGIONS: CPT | Performed by: PHYSICAL THERAPIST

## 2024-08-15 PROCEDURE — 97110 THERAPEUTIC EXERCISES: CPT | Performed by: PHYSICAL THERAPIST

## 2024-08-15 NOTE — PROGRESS NOTES
Daily Note     Today's date: 8/15/2024  Patient name: Arcelia Rivas  : 1960  MRN: [id number]  Referring provider: Margret uCnha PA-C  Dx:   Encounter Diagnosis     ICD-10-CM    1. Primary osteoarthritis of right knee  M17.11                      Subjective: Arcelia arrives with some general calf soreness, otherwise doing well.      Objective: See treatment diary below      Assessment: Tolerated treatment well. Patient demonstrated fatigue post treatment and exhibited good technique with therapeutic exercises. Arcelia showed great improvement in endurance and strength this session, warming up to WNL range of motion on the bike and passively. Emphasis placed on hip control and abductor activation toay with tactile and verbal cueing with step downs. Added hip hiking for home exercise program.      Plan: Continue per plan of care.      Precautions: s/p R TKA        Manuals 7/24 8/5 8/8 8/12 8/15        PROM R Knee  RN RN RN RN                                               Neuro Re-Ed                                                                                                        Ther Ex             Quad sets  10x5'' 10x5''          Glute sets  10x5''           Ankle pumps  2x10           Seated knee flexion  2x10x5''           Supine heel slides  10x5'' straps 2x10x5'' w/ straps          Bridges   2x10 2x10 2x10 2x10        SLR   X5 w/ assist 2x10 min assist 2x10 min A        HR/TR   2x10 2x10 2x10        Mini Squats   2x10 2x10         Bike (Cardiovascular Endurance)    5' lvl 0 5' lvl 0        Standing calf s'     5x15''        SLR hip abd     2x10        LAQ     2x10        Hip hiking     2x10 lvl 2        Ther Activity             Step ups    2x10 lvl 2  2x10 lvl 2        Step up and overs    2x10 lvl 1 2x10 lvl 1        Gait Training             RW  5'           SPC   5' 5'         Modalities

## 2024-08-16 ENCOUNTER — APPOINTMENT (OUTPATIENT)
Dept: RADIOLOGY | Facility: CLINIC | Age: 64
End: 2024-08-16
Payer: COMMERCIAL

## 2024-08-16 ENCOUNTER — OFFICE VISIT (OUTPATIENT)
Dept: OBGYN CLINIC | Facility: CLINIC | Age: 64
End: 2024-08-16

## 2024-08-16 VITALS
DIASTOLIC BLOOD PRESSURE: 82 MMHG | HEART RATE: 69 BPM | SYSTOLIC BLOOD PRESSURE: 137 MMHG | BODY MASS INDEX: 34.55 KG/M2 | WEIGHT: 195 LBS | HEIGHT: 63 IN

## 2024-08-16 DIAGNOSIS — Z47.1 AFTERCARE FOLLOWING RIGHT KNEE JOINT REPLACEMENT SURGERY: ICD-10-CM

## 2024-08-16 DIAGNOSIS — Z47.1 AFTERCARE FOLLOWING RIGHT KNEE JOINT REPLACEMENT SURGERY: Primary | ICD-10-CM

## 2024-08-16 DIAGNOSIS — Z96.651 AFTERCARE FOLLOWING RIGHT KNEE JOINT REPLACEMENT SURGERY: Primary | ICD-10-CM

## 2024-08-16 DIAGNOSIS — Z96.651 AFTERCARE FOLLOWING RIGHT KNEE JOINT REPLACEMENT SURGERY: ICD-10-CM

## 2024-08-16 PROCEDURE — 73562 X-RAY EXAM OF KNEE 3: CPT

## 2024-08-16 PROCEDURE — 99024 POSTOP FOLLOW-UP VISIT: CPT | Performed by: STUDENT IN AN ORGANIZED HEALTH CARE EDUCATION/TRAINING PROGRAM

## 2024-08-16 NOTE — PROGRESS NOTES
Subjective:64 y.o. female presents to the office 2 weeks s/p right total knee arthroplasty performed on 07/31/2024. She has been doing very well overall, having some medial knee pain. Pain controlled. Progressing well in physical therapy, ambulating with the assistance of a cane. Incision without drainage. Denies fevers or chills    Physical Exam:  Incision: clean, dry, and intact  ROM: 5-115  5/5 IP/Q/HS/TA/GS, 2+ DP/PT, SILT DP/SP/S/S/TN    XR right knee: s/p cemented CR attune total knee arthroplasty, components in good position. No fracture or dislocation    Assessment/Plan:  2 weeks s/p right total knee arthroplasty performed on 07/31/2024    - continue multi-modal pain control   - Weight bearing status: as tolerated  - DVT ppx: aspirin 81 mg BID for an additional 2 weeks (4 weeks total)  - Incision care: avoid scrubbing or submerging  - Continue PT/OT  - F/U in 4 weeks    Scribe Attestation      I,:  Latasha Moseley am acting as a scribe while in the presence of the attending physician.:       I,:  Tripp Szymanski,  personally performed the services described in this documentation    as scribed in my presence.:

## 2024-08-19 ENCOUNTER — OFFICE VISIT (OUTPATIENT)
Dept: PHYSICAL THERAPY | Facility: CLINIC | Age: 64
End: 2024-08-19
Payer: COMMERCIAL

## 2024-08-19 DIAGNOSIS — M17.11 PRIMARY OSTEOARTHRITIS OF RIGHT KNEE: Primary | ICD-10-CM

## 2024-08-19 PROCEDURE — 97110 THERAPEUTIC EXERCISES: CPT | Performed by: PHYSICAL THERAPIST

## 2024-08-19 PROCEDURE — 97140 MANUAL THERAPY 1/> REGIONS: CPT | Performed by: PHYSICAL THERAPIST

## 2024-08-19 NOTE — PROGRESS NOTES
Daily Note     Today's date: 2024  Patient name: Arcelia Rivas  : 1960  MRN: [id number]  Referring provider: Margret Cunha PA-C  Dx:   Encounter Diagnosis     ICD-10-CM    1. Primary osteoarthritis of right knee  M17.11                      Subjective: Arcelia feels better each day, she is pleased with how her incision looks.      Objective: See treatment diary below      Assessment: Tolerated treatment well. Patient demonstrated fatigue post treatment. Add in mini squats next session. Encouraged her to continue hip hiking and work on eccentric step downs at home, improvement from last session. to She is to try just tylenol next session. Updated HEP to reflect changes made this session.        Plan: Continue per plan of care.      Precautions: s/p R TKA        Manuals 7/24 8/5 8/8 8/12 8/15 8/19       PROM R Knee  RN RN RN RN RN                                              Neuro Re-Ed                                                                                                        Ther Ex             Quad sets  10x5'' 10x5''          Glute sets  10x5''           Ankle pumps  2x10           Seated knee flexion  2x10x5''           Supine heel slides  10x5'' straps 2x10x5'' w/ straps          Bridges   2x10 2x10 2x10 2x10 2x10       SLR   X5 w/ assist 2x10 min assist 2x10 min A 2x10 min A       HR/TR   2x10 2x10 2x10 2x10       Mini Squats   2x10 2x10         Bike (Cardiovascular Endurance)    5' lvl 0 5' lvl 0 5' lvl 0       Standing calf s'     5x15'' 5x15''       SLR hip abd     2x10 2x10       LAQ     2x10 2x10       Hip hiking     2x10 lvl 2 2x10 lvl 2       Ball squats      nv                                 Ther Activity                                                    Step ups    2x10 lvl 2  2x10 lvl 2 2x10 lvl 2       Step up and overs    2x10 lvl 1 2x10 lvl 1 2x10 lvl 1       Gait Training             RW  5'           SPC   5' 5'         Modalities

## 2024-08-22 ENCOUNTER — OFFICE VISIT (OUTPATIENT)
Dept: PHYSICAL THERAPY | Facility: CLINIC | Age: 64
End: 2024-08-22
Payer: COMMERCIAL

## 2024-08-22 DIAGNOSIS — M17.11 PRIMARY OSTEOARTHRITIS OF RIGHT KNEE: Primary | ICD-10-CM

## 2024-08-22 PROCEDURE — 97140 MANUAL THERAPY 1/> REGIONS: CPT | Performed by: PHYSICAL THERAPIST

## 2024-08-22 PROCEDURE — 97110 THERAPEUTIC EXERCISES: CPT | Performed by: PHYSICAL THERAPIST

## 2024-08-22 NOTE — PROGRESS NOTES
Daily Note     Today's date: 2024  Patient name: Arcelia Rivas  : 1960  MRN: [id number]  Referring provider: Margret Cunha PA-C  Dx:   Encounter Diagnosis     ICD-10-CM    1. Primary osteoarthritis of right knee  M17.11                      Subjective: Arcelia reports that her knee is doing very well, going down stairs is becoming easier. Her goal is to get upstairs to her bed and down to the laundry this weekend.       Objective: See treatment diary below      Assessment: Tolerated treatment well. Patient demonstrated fatigue post treatment and exhibited good technique with therapeutic exercises. Adjustments made to routine this session to include ball squats, LAQ and education on sit to stand from chair. Proper cueing on form provided to avoid pushing from hands to fabienne up. Updated HEP to reflect changes made this session. Also decreased to 1x/week given her consistency at home and improvements made in PT.      Plan: Continue per plan of care.      Precautions: s/p R TKA        Manuals  8/5 8/8 8/12 8/15 8/19 8/22      PROM R Knee  RN RN RN RN RN RN                                             Neuro Re-Ed                                                                                                        Ther Ex             Quad sets  10x5'' 10x5''          Glute sets  10x5''           Ankle pumps  2x10           Seated knee flexion  2x10x5''           Supine heel slides  10x5'' straps 2x10x5'' w/ straps          Bridges   2x10 2x10 2x10 2x10 2x10 2x10      SLR   X5 w/ assist 2x10 min assist 2x10 min A 2x10 min A 2x10      HR/TR   2x10 2x10 2x10 2x10 2x10      Mini Squats   2x10 2x10         Bike (Cardiovascular Endurance)    5' lvl 0 5' lvl 0 5' lvl 0 5' lvl 0      Standing calf s'     5x15'' 5x15'' 5x15''      SLR hip abd     2x10 2x10 2x10 Inc nv     LAQ     2x10 2x10 2x10, 2#      Hip hiking     2x10 lvl 2 2x10 lvl 2 2x10 lvl 2      Ball squats      nv 2x10      Standing HS curls        2x10, 2#      Leg press       nv      Ther Activity             STS       2x10 no foam                                Step ups    2x10 lvl 2  2x10 lvl 2 2x10 lvl 2 2x10 lvl 2      Step up and overs    2x10 lvl 1 2x10 lvl 1 2x10 lvl 1 2x10 lvl 2      Gait Training             RW  5'           SPC   5' 5'         Modalities

## 2024-08-26 ENCOUNTER — APPOINTMENT (OUTPATIENT)
Dept: PHYSICAL THERAPY | Facility: CLINIC | Age: 64
End: 2024-08-26
Payer: COMMERCIAL

## 2024-08-29 ENCOUNTER — OFFICE VISIT (OUTPATIENT)
Dept: PHYSICAL THERAPY | Facility: CLINIC | Age: 64
End: 2024-08-29
Payer: COMMERCIAL

## 2024-08-29 DIAGNOSIS — M17.11 PRIMARY OSTEOARTHRITIS OF RIGHT KNEE: Primary | ICD-10-CM

## 2024-08-29 PROCEDURE — 97110 THERAPEUTIC EXERCISES: CPT | Performed by: PHYSICAL THERAPIST

## 2024-08-29 PROCEDURE — 97530 THERAPEUTIC ACTIVITIES: CPT | Performed by: PHYSICAL THERAPIST

## 2024-08-29 NOTE — PROGRESS NOTES
Daily Note     Today's date: 2024  Patient name: Arcelia Rivas  : 1960  MRN: [id number]  Referring provider: Margret Cunha PA-C  Dx:   Encounter Diagnosis     ICD-10-CM    1. Primary osteoarthritis of right knee  M17.11                      Subjective: Arcelia explains that her knee is doing well, she inquires about safety with kneeling.      Objective: See treatment diary below      Assessment: Tolerated treatment well. Patient demonstrated fatigue post treatment and exhibited good technique with therapeutic exercises. Educated on kneeling and likely being limited due to discomfort more than anything. Arcelia had 123 degrees actively into knee flexion before stretches today. She continues to show great improvement and is making good progress towards a return to maximal level of function.       Plan: Continue per plan of care.      Precautions: s/p R TKA        Manuals 7/24 8/5 8/8 8/12 8/15 8/19 8/22 8/29     PROM R Knee  RN RN RN RN RN RN RN                                            Neuro Re-Ed                                                                                                        Ther Ex             Quad sets  10x5'' 10x5''          Glute sets  10x5''           Ankle pumps  2x10           Seated knee flexion  2x10x5''           Supine heel slides  10x5'' straps 2x10x5'' w/ straps          Bridges   2x10 2x10 2x10 2x10 2x10 2x10 2x10     SLR   X5 w/ assist 2x10 min assist 2x10 min A 2x10 min A 2x10 2x10     HR/TR   2x10 2x10 2x10 2x10 2x10 2x10     Mini Squats   2x10 2x10         Bike (Cardiovascular Endurance)    5' lvl 0 5' lvl 0 5' lvl 0 5' lvl 0 6' lvl 1     Standing calf s'     5x15'' 5x15'' 5x15'' 5x15''     SLR hip abd     2x10 2x10 2x10 Inc nv     LAQ     2x10 2x10 2x10, 2# 2x10, 5#     Hip hiking     2x10 lvl 2 2x10 lvl 2 2x10 lvl 2 2x10 lvl 2     Ball squats      nv 2x10 2x10     Standing HS curls       2x10, 2# 2x10, 5#     Leg press       nv 2x10 125#, 2x10 55#     Ther  Activity             STS       2x10 no foam 2x10                               Step ups    2x10 lvl 2  2x10 lvl 2 2x10 lvl 2 2x10 lvl 2 2x10 lvl 2     Step up and overs    2x10 lvl 1 2x10 lvl 1 2x10 lvl 1 2x10 lvl 2 2x10 lvl 2     Gait Training             RW  5'           SPC   5' 5'         Modalities

## 2024-09-03 ENCOUNTER — APPOINTMENT (OUTPATIENT)
Dept: PHYSICAL THERAPY | Facility: CLINIC | Age: 64
End: 2024-09-03
Payer: COMMERCIAL

## 2024-09-05 ENCOUNTER — OFFICE VISIT (OUTPATIENT)
Dept: PHYSICAL THERAPY | Facility: CLINIC | Age: 64
End: 2024-09-05
Payer: COMMERCIAL

## 2024-09-05 DIAGNOSIS — M17.11 PRIMARY OSTEOARTHRITIS OF RIGHT KNEE: Primary | ICD-10-CM

## 2024-09-05 PROCEDURE — 97530 THERAPEUTIC ACTIVITIES: CPT | Performed by: PHYSICAL THERAPIST

## 2024-09-05 PROCEDURE — 97110 THERAPEUTIC EXERCISES: CPT | Performed by: PHYSICAL THERAPIST

## 2024-09-05 NOTE — PROGRESS NOTES
Daily Note     Today's date: 2024  Patient name: Arcelia Rivas  : 1960  MRN: [id number]  Referring provider: Margret Cunha PA-C  Dx:   Encounter Diagnosis     ICD-10-CM    1. Primary osteoarthritis of right knee  M17.11                      Subjective: Arcelia explains that her knee is doing well, she inquires about safety with kneeling.      Objective: See treatment diary below      Assessment: Tolerated treatment well. Patient demonstrated fatigue post treatment and exhibited good technique with therapeutic exercises. Added in bridges on ball, resistance during SLR and observed a full set of stairs, no significant compensations or abnormalities noted. Continue to progress strength and weight bearing exercises nv as we progress towards maximal level of function.       Plan: Continue per plan of care.      Precautions: s/p R TKA        Manuals 7/24 8/5 8/8 8/12 8/15 8/19 8/22 8/29 9/5    PROM R Knee  RN RN RN RN RN RN RN RN                                           Neuro Re-Ed             Bridges on ball         2x10                                                                                  Ther Ex             Quad sets  10x5'' 10x5''          Glute sets  10x5''           Ankle pumps  2x10           Seated knee flexion  2x10x5''           Supine heel slides  10x5'' straps 2x10x5'' w/ straps          Bridges   2x10 2x10 2x10 2x10 2x10 2x10 2x10 2x10    SLR   X5 w/ assist 2x10 min assist 2x10 min A 2x10 min A 2x10 2x10 2x10, 2#    HR/TR   2x10 2x10 2x10 2x10 2x10 2x10     Mini Squats   2x10 2x10         Bike (Cardiovascular Endurance)    5' lvl 0 5' lvl 0 5' lvl 0 5' lvl 0 6' lvl 1 6' lvl 1    Standing calf s'     5x15'' 5x15'' 5x15'' 5x15'' 5x15''    SLR hip abd     2x10 2x10 2x10 Inc nv 2x10, 2#    LAQ     2x10 2x10 2x10, 2# 2x10, 5# 2x10, 5#    Hip hiking     2x10 lvl 2 2x10 lvl 2 2x10 lvl 2 2x10 lvl 2 2x10 lvl 2    Ball squats      nv 2x10 2x10 2x10    Standing HS curls       2x10, 2# 2x10, 5#  2x10, 5#    Leg press       nv 2x10 125#, 2x10 55# 2x10 135#, 2x10 65#    Ther Activity             STS       2x10 no foam 2x10 2x10                              Step ups    2x10 lvl 2  2x10 lvl 2 2x10 lvl 2 2x10 lvl 2 2x10 lvl 2     Step up and overs    2x10 lvl 1 2x10 lvl 1 2x10 lvl 1 2x10 lvl 2 2x10 lvl 2 Full set of stairs    Gait Training             RW  5'           SPC   5' 5'         Modalities

## 2024-09-09 ENCOUNTER — APPOINTMENT (OUTPATIENT)
Dept: PHYSICAL THERAPY | Facility: CLINIC | Age: 64
End: 2024-09-09
Payer: COMMERCIAL

## 2024-09-12 ENCOUNTER — OFFICE VISIT (OUTPATIENT)
Dept: PHYSICAL THERAPY | Facility: CLINIC | Age: 64
End: 2024-09-12
Payer: COMMERCIAL

## 2024-09-12 ENCOUNTER — OFFICE VISIT (OUTPATIENT)
Dept: FAMILY MEDICINE CLINIC | Facility: CLINIC | Age: 64
End: 2024-09-12
Payer: COMMERCIAL

## 2024-09-12 VITALS
BODY MASS INDEX: 34.66 KG/M2 | HEIGHT: 63 IN | TEMPERATURE: 98.1 F | WEIGHT: 195.6 LBS | HEART RATE: 86 BPM | SYSTOLIC BLOOD PRESSURE: 124 MMHG | DIASTOLIC BLOOD PRESSURE: 78 MMHG | OXYGEN SATURATION: 96 % | RESPIRATION RATE: 16 BRPM

## 2024-09-12 DIAGNOSIS — E78.5 HYPERLIPIDEMIA, UNSPECIFIED HYPERLIPIDEMIA TYPE: ICD-10-CM

## 2024-09-12 DIAGNOSIS — Z13.31 POSITIVE DEPRESSION SCREENING: ICD-10-CM

## 2024-09-12 DIAGNOSIS — R79.89 ELEVATED TSH: ICD-10-CM

## 2024-09-12 DIAGNOSIS — M17.11 PRIMARY OSTEOARTHRITIS OF RIGHT KNEE: Primary | ICD-10-CM

## 2024-09-12 DIAGNOSIS — Z00.00 ANNUAL PHYSICAL EXAM: Primary | ICD-10-CM

## 2024-09-12 DIAGNOSIS — Z12.31 SCREENING MAMMOGRAM FOR BREAST CANCER: ICD-10-CM

## 2024-09-12 PROCEDURE — 99396 PREV VISIT EST AGE 40-64: CPT | Performed by: FAMILY MEDICINE

## 2024-09-12 PROCEDURE — 97530 THERAPEUTIC ACTIVITIES: CPT | Performed by: PHYSICAL THERAPIST

## 2024-09-12 PROCEDURE — 97110 THERAPEUTIC EXERCISES: CPT | Performed by: PHYSICAL THERAPIST

## 2024-09-12 RX ORDER — ATORVASTATIN CALCIUM 40 MG/1
40 TABLET, FILM COATED ORAL
Qty: 90 TABLET | Refills: 3 | Status: SHIPPED | OUTPATIENT
Start: 2024-09-12

## 2024-09-12 NOTE — ASSESSMENT & PLAN NOTE
Overall patient is doing well  Discussed diet exercise weight loss will continue current medications  Preventative maintenance anticipatory guidance given  Labs ordered  She can follow-up with me in 6 months or sooner if

## 2024-09-12 NOTE — PROGRESS NOTES
Adult Annual Physical  Name: Arcelia Rivas      : 1960      MRN: [id number]  Encounter Provider: Shiv Burkett DO  Encounter Date: 2024   Encounter department: [facility]    Assessment & Plan  Annual physical exam    Overall patient is doing well  Discussed diet exercise weight loss will continue current medications  Preventative maintenance anticipatory guidance given  Labs ordered  She can follow-up with me in 6 months or sooner if         Screening mammogram for breast cancer    Orders:    Mammo screening bilateral w 3d and cad; Future    Hyperlipidemia, unspecified hyperlipidemia type    Orders:    atorvastatin (LIPITOR) 40 mg tablet; Take 1 tablet (40 mg total) by mouth daily at bedtime    Lipid panel; Future    Lipid panel    Elevated TSH    Orders:    TSH, 3rd generation; Future    TSH, 3rd generation    Positive depression screening         Immunizations and preventive care screenings were discussed with patient today. Appropriate education was printed on patient's after visit summary.    Counseling:  Dental Health: discussed importance of regular tooth brushing, flossing, and dental visits.  Exercise: the importance of regular exercise/physical activity was discussed. Recommend exercise 3-5 times per week for at least 30 minutes.       Depression Screening and Follow-up Plan: Patient's depression screening was positive with a PHQ-9 score of 6. Patient assessed for underlying major depression. Brief counseling provided and recommend additional follow-up/re-evaluation next office visit.         History of Present Illness     Adult Annual Physical:  Patient presents for annual physical. Patient presents today for annual physical  She recently got her knee replaced she had been doing very well with the recovery  Reviewed her recent blood work  She has no acute complaints today.     Diet and Physical Activity:  - Diet/Nutrition: well balanced diet.  - Exercise:  walking.    Depression Screening:    - PHQ-9 Score: 6    General Health:  - Sleep: 4-6 hours of sleep on average.  - Hearing: normal hearing bilateral ears.  - Vision: no vision problems.  - Dental: regular dental visits.    /GYN Health:  - Follows with GYN: yes.   - Menopause: postmenopausal.   - History of STDs: no    Review of Systems   Constitutional: Negative.    HENT: Negative.     Eyes: Negative.    Respiratory: Negative.     Cardiovascular: Negative.    Gastrointestinal: Negative.    Endocrine: Negative.    Genitourinary: Negative.    Musculoskeletal: Negative.    Skin: Negative.    Allergic/Immunologic: Negative.    Neurological: Negative.    Hematological: Negative.    Psychiatric/Behavioral: Negative.     All other systems reviewed and are negative.    Medical History Reviewed by provider this encounter:  Tobacco  Allergies  Meds  Problems  Med Hx  Surg Hx  Fam Hx       Current Outpatient Medications on File Prior to Visit   Medication Sig Dispense Refill    Calcium Carbonate-Vitamin D (CALCIUM 600+D PO) Take 1 tablet by mouth daily      cholecalciferol (VITAMIN D3) 1,000 units tablet TAKE 2 TABLETS BY MOUTH DAILY 60 tablet 5    Humira, 2 Pen, 40 MG/0.4ML PNKT       Multiple Vitamins-Minerals (multivitamin with minerals) tablet Take 1 tablet by mouth daily 30 tablet 1    [DISCONTINUED] atorvastatin (LIPITOR) 40 mg tablet Take 1 tablet (40 mg total) by mouth daily at bedtime 90 tablet 1    ascorbic acid (VITAMIN C) 500 MG tablet Take 1 tablet (500 mg total) by mouth 2 (two) times a day (Patient not taking: Reported on 9/12/2024) 60 tablet 1    aspirin 81 mg chewable tablet Chew 1 tablet (81 mg total) 2 (two) times a day (Patient not taking: Reported on 9/12/2024) 60 tablet 0    multivitamin (THERAGRAN) TABS Take 1 tablet by mouth daily at bedtime (Patient not taking: Reported on 9/12/2024)      ondansetron (ZOFRAN) 4 mg tablet Take 1 tablet (4 mg total) by mouth every 8 (eight) hours as needed for  "nausea or vomiting (Patient not taking: Reported on 9/12/2024) 20 tablet 0    senna-docusate sodium (SENOKOT-S) 8.6-50 mg per tablet Take 1 tablet by mouth daily (Patient not taking: Reported on 9/12/2024) 14 tablet 0     No current facility-administered medications on file prior to visit.      Social History     Tobacco Use    Smoking status: Never     Passive exposure: Never    Smokeless tobacco: Never   Vaping Use    Vaping status: Never Used   Substance and Sexual Activity    Alcohol use: Yes     Comment: Very occasionally    Drug use: No    Sexual activity: Yes     Partners: Male     Birth control/protection: Male Sterilization       Objective     /80 (BP Location: Left arm, Patient Position: Sitting, Cuff Size: Large)   Pulse 86   Temp 98.1 °F (36.7 °C) (Tympanic)   Resp 16   Ht 5' 3\" (1.6 m)   Wt 88.7 kg (195 lb 9.6 oz)   LMP  (LMP Unknown)   SpO2 96%   BMI 34.65 kg/m²     Physical Exam  Vitals and nursing note reviewed.   Constitutional:       Appearance: Normal appearance. She is well-developed.   HENT:      Head: Normocephalic.      Right Ear: External ear normal.      Left Ear: External ear normal.      Nose: Nose normal.      Mouth/Throat:      Mouth: Mucous membranes are moist.   Eyes:      Conjunctiva/sclera: Conjunctivae normal.      Pupils: Pupils are equal, round, and reactive to light.   Cardiovascular:      Rate and Rhythm: Normal rate and regular rhythm.      Heart sounds: Normal heart sounds.   Pulmonary:      Effort: Pulmonary effort is normal.      Breath sounds: Normal breath sounds.   Abdominal:      General: Bowel sounds are normal.      Palpations: Abdomen is soft.   Musculoskeletal:         General: Normal range of motion.      Cervical back: Normal range of motion and neck supple.   Skin:     General: Skin is warm and dry.   Neurological:      General: No focal deficit present.      Mental Status: She is alert and oriented to person, place, and time.   Psychiatric:         " Behavior: Behavior normal.         Thought Content: Thought content normal.         Judgment: Judgment normal.       Depression Screening Follow-up Plan: Patient's depression screening was positive with a PHQ-2 score of . Their PHQ-9 score was 6. Patient declines further evaluation by mental health professional and/or medications. They have no active suicidal ideations. Brief counseling provided and recommend additional follow-up/re-evaluation at next office visit.

## 2024-09-12 NOTE — PROGRESS NOTES
Daily Note     Today's date: 2024  Patient name: Arcelia Rivas  : 1960  MRN: [id number]  Referring provider: Margret Cunha PA-C  Dx:   Encounter Diagnosis     ICD-10-CM    1. Primary osteoarthritis of right knee  M17.11                      Subjective: Arcelia explains that her knee continues to do very well, she is doing stairs, walking on uneven surfaces and completing all ADLs with much more confidence. She is not having pain during the day and much less at night time.       Objective: See treatment diary below      Assessment: Tolerated treatment well. Patient demonstrated fatigue post treatment and exhibited good technique with therapeutic exercises. Educated on proper form and control during partial lunges onto the left knee and with hand support. Arcelia continues to show great improvements in strength and range of motion, WNL for both. She was educated on proper lunge form today and will likely be transitioned to an independent HEP after her next scheduled appointment given her independence and improvements.       Plan: Continue per plan of care.      Precautions: s/p R TKA        Manuals 7/24 8/5 8/8 8/12 8/15 8/19 8/22 8/29 9/5 9/12   PROM R Knee  RN RN RN RN RN RN RN RN RN                                          Neuro Re-Ed             Bridges on ball         2x10 2x10                                                                                 Ther Ex             Quad sets  10x5'' 10x5''          Glute sets  10x5''           Ankle pumps  2x10           Seated knee flexion  2x10x5''           Supine heel slides  10x5'' straps 2x10x5'' w/ straps          Bridges   2x10 2x10 2x10 2x10 2x10 2x10 2x10 2x10 2x10   SLR   X5 w/ assist 2x10 min assist 2x10 min A 2x10 min A 2x10 2x10 2x10, 2# 2x10, 2#   HR/TR   2x10 2x10 2x10 2x10 2x10 2x10     Mini Squats   2x10 2x10         Bike (Cardiovascular Endurance)    5' lvl 0 5' lvl 0 5' lvl 0 5' lvl 0 6' lvl 1 6' lvl 1 10' lvl 1   Standing calf s'      5x15'' 5x15'' 5x15'' 5x15'' 5x15'' 5x15''   SLR hip abd     2x10 2x10 2x10 Inc nv 2x10, 2# 2x10, 2#   LAQ     2x10 2x10 2x10, 2# 2x10, 5# 2x10, 5# 2x10, 5#   Hip hiking     2x10 lvl 2 2x10 lvl 2 2x10 lvl 2 2x10 lvl 2 2x10 lvl 2 2x10 lvl 2   Ball squats      nv 2x10 2x10 2x10 2x10   Standing HS curls       2x10, 2# 2x10, 5# 2x10, 5# 2x10, 5#   Leg press       nv 2x10 125#, 2x10 55# 2x10 135#, 2x10 65# 2x10 145#, 2x10 75#   Ther Activity             STS       2x10 no foam 2x10 2x10 2x10   Lunging          2x10 partialw/ hand rail                Step ups    2x10 lvl 2  2x10 lvl 2 2x10 lvl 2 2x10 lvl 2 2x10 lvl 2     Step up and overs    2x10 lvl 1 2x10 lvl 1 2x10 lvl 1 2x10 lvl 2 2x10 lvl 2 Full set of stairs    Gait Training             RW  5'           SPC   5' 5'         Modalities

## 2024-09-12 NOTE — PATIENT INSTRUCTIONS
"Patient Education     Routine physical for adults   The Basics   Written by the doctors and editors at [facility]   What is a physical? -- A physical is a routine visit, or \"check-up,\" with your doctor. You might also hear it called a \"wellness visit\" or \"preventive visit.\"  During each visit, the doctor will:   Ask about your physical and mental health   Ask about your habits, behaviors, and lifestyle   Do an exam   Give you vaccines if needed   Talk to you about any medicines you take   Give advice about your health   Answer your questions  Getting regular check-ups is an important part of taking care of your health. It can help your doctor find and treat any problems you have. But it's also important for preventing health problems.  A routine physical is different from a \"sick visit.\" A sick visit is when you see a doctor because of a health concern or problem. Since physicals are scheduled ahead of time, you can think about what you want to ask the doctor.  How often should I get a physical? -- It depends on your age and health. In general, for people age 21 years and older:   If you are younger than 50 years, you might be able to get a physical every 3 years.   If you are 50 years or older, your doctor might recommend a physical every year.  If you have an ongoing health condition, like diabetes or high blood pressure, your doctor will probably want to see you more often.  What happens during a physical? -- In general, each visit will include:   Physical exam - The doctor or nurse will check your height, weight, heart rate, and blood pressure. They will also look at your eyes and ears. They will ask about how you are feeling and whether you have any symptoms that bother you.   Medicines - It's a good idea to bring a list of all the medicines you take to each doctor visit. Your doctor will talk to you about your medicines and answer any questions. Tell them if you are having any side effects that bother you. You " "should also tell them if you are having trouble paying for any of your medicines.   Habits and behaviors - This includes:   Your diet   Your exercise habits   Whether you smoke, drink alcohol, or use drugs   Whether you are sexually active   Whether you feel safe at home  Your doctor will talk to you about things you can do to improve your health and lower your risk of health problems. They will also offer help and support. For example, if you want to quit smoking, they can give you advice and might prescribe medicines. If you want to improve your diet or get more physical activity, they can help you with this, too.   Lab tests, if needed - The tests you get will depend on your age and situation. For example, your doctor might want to check your:   Cholesterol   Blood sugar   Iron level   Vaccines - The recommended vaccines will depend on your age, health, and what vaccines you already had. Vaccines are very important because they can prevent certain serious or deadly infections.   Discussion of screening - \"Screening\" means checking for diseases or other health problems before they cause symptoms. Your doctor can recommend screening based on your age, risk, and preferences. This might include tests to check for:   Cancer, such as breast, prostate, cervical, ovarian, colorectal, prostate, lung, or skin cancer   Sexually transmitted infections, such as chlamydia and gonorrhea   Mental health conditions like depression and anxiety  Your doctor will talk to you about the different types of screening tests. They can help you decide which screenings to have. They can also explain what the results might mean.   Answering questions - The physical is a good time to ask the doctor or nurse questions about your health. If needed, they can refer you to other doctors or specialists, too.  Adults older than 65 years often need other care, too. As you get older, your doctor will talk to you about:   How to prevent falling at " home   Hearing or vision tests   Memory testing   How to take your medicines safely   Making sure that you have the help and support you need at home  All topics are updated as new evidence becomes available and our peer review process is complete.  This topic retrieved from [employer] on: May 02, 2024.  Topic 758955 Version 1.0  Release: 32.4.3 - C32.122  © 2024 UpToDate, Inc. and/or its affiliates. All rights reserved.  Consumer Information Use and Disclaimer   Disclaimer: This generalized information is a limited summary of diagnosis, treatment, and/or medication information. It is not meant to be comprehensive and should be used as a tool to help the user understand and/or assess potential diagnostic and treatment options. It does NOT include all information about conditions, treatments, medications, side effects, or risks that may apply to a specific patient. It is not intended to be medical advice or a substitute for the medical advice, diagnosis, or treatment of a health care provider based on the health care provider's examination and assessment of a patient's specific and unique circumstances. Patients must speak with a health care provider for complete information about their health, medical questions, and treatment options, including any risks or benefits regarding use of medications. This information does not endorse any treatments or medications as safe, effective, or approved for treating a specific patient. UpToDate, Inc. and its affiliates disclaim any warranty or liability relating to this information or the use thereof.The use of this information is governed by the Terms of Use, available at https://www.woltersPubliAtisuwer.com/en/know/clinical-effectiveness-terms. 2024© UpToDate, Inc. and its affiliates and/or licensors. All rights reserved.  Copyright   © 2024 UpToDate, Inc. and/or its affiliates. All rights reserved.

## 2024-09-12 NOTE — ASSESSMENT & PLAN NOTE
Orders:    atorvastatin (LIPITOR) 40 mg tablet; Take 1 tablet (40 mg total) by mouth daily at bedtime    Lipid panel; Future    Lipid panel

## 2024-09-13 ENCOUNTER — OFFICE VISIT (OUTPATIENT)
Dept: OBGYN CLINIC | Facility: CLINIC | Age: 64
End: 2024-09-13

## 2024-09-13 VITALS
DIASTOLIC BLOOD PRESSURE: 84 MMHG | BODY MASS INDEX: 34.55 KG/M2 | SYSTOLIC BLOOD PRESSURE: 132 MMHG | WEIGHT: 195 LBS | HEIGHT: 63 IN

## 2024-09-13 DIAGNOSIS — Z96.651 AFTERCARE FOLLOWING RIGHT KNEE JOINT REPLACEMENT SURGERY: Primary | ICD-10-CM

## 2024-09-13 DIAGNOSIS — Z47.1 AFTERCARE FOLLOWING RIGHT KNEE JOINT REPLACEMENT SURGERY: Primary | ICD-10-CM

## 2024-09-13 PROCEDURE — 99024 POSTOP FOLLOW-UP VISIT: CPT | Performed by: STUDENT IN AN ORGANIZED HEALTH CARE EDUCATION/TRAINING PROGRAM

## 2024-09-13 RX ORDER — AMOXICILLIN 500 MG/1
CAPSULE ORAL
Qty: 4 CAPSULE | Refills: 5 | Status: SHIPPED | OUTPATIENT
Start: 2024-09-13 | End: 2024-10-04

## 2024-09-13 NOTE — PROGRESS NOTES
Subjective: 64-year-old female presents to the office 6 weeks s/p right total knee arthroplasty performed on 7/31/2024.  Patient has been doing very well overall.  Pain is well-controlled.  She is progressing with physical therapy.  She is currently ambulating without assistive devices.  Incision without drainage.  She denies any fevers or chills.     Physical Exam:  Incision: Clean, dry, well-healed  ROM: 0-120  5/5 IP/Q/HS/TA/GS, 2+ DP/PT, SILT DP/SP/S/S/TN    No new imaging taken today.    Assessment/Plan:  64-year-old female presents to the office 6 weeks s/p right total knee arthroplasty performed on 7/31/2024.  She is doing well.    - continue multi-modal pain control   - Weight bearing as tolerated to the right lower extremity  - DVT ppx: Completed  - PT/OT  - F/U in 6 weeks time    Scribe Attestation      I,:   am acting as a scribe while in the presence of the attending physician.:       I,:   personally performed the services described in this documentation    as scribed in my presence.:

## 2024-09-16 ENCOUNTER — APPOINTMENT (OUTPATIENT)
Dept: PHYSICAL THERAPY | Facility: CLINIC | Age: 64
End: 2024-09-16
Payer: COMMERCIAL

## 2024-09-19 ENCOUNTER — OFFICE VISIT (OUTPATIENT)
Dept: PHYSICAL THERAPY | Facility: CLINIC | Age: 64
End: 2024-09-19
Payer: COMMERCIAL

## 2024-09-19 DIAGNOSIS — M17.11 PRIMARY OSTEOARTHRITIS OF RIGHT KNEE: Primary | ICD-10-CM

## 2024-09-19 PROCEDURE — 97110 THERAPEUTIC EXERCISES: CPT | Performed by: PHYSICAL THERAPIST

## 2024-09-19 PROCEDURE — 97530 THERAPEUTIC ACTIVITIES: CPT | Performed by: PHYSICAL THERAPIST

## 2024-09-19 NOTE — PROGRESS NOTES
Daily Note     Today's date: 2024  Patient name: Arcelia Rivas  : 1960  MRN: [id number]  Referring provider: Margret Cunha PA-C  Dx:   Encounter Diagnosis     ICD-10-CM    1. Primary osteoarthritis of right knee  M17.11                      Subjective: Arcelia explains that her knee continues to do well and she is feeling great, she is confident with her HEP and feels comfortable moving forward with her HEP independently.      Objective: See treatment diary below      Assessment: Tolerated treatment well. Patient demonstrated fatigue post treatment. She has 125 degrees knee flexion actively, 130 passive. Pt has made improvements in strength, range of motion, pain control and towards a return to maximal level of function. Pt reports they have improved to 95% since beginning Physical Therapy. Pt educated on importance of continuing HEP, progressions of exercises and to contact the facility if there are any questions or concerns in the future. Pt will be discharged from PT at this time.        Plan:  DC from PT     Precautions: s/p R TKA        Manuals 7/24 8/5 8/8 8/12 8/15 8/19 8/22 8/29 9/5 9/19   PROM R Knee  RN RN RN RN RN RN RN RN RN                                          Neuro Re-Ed             Bridges on ball         2x10 2x10                                                                                 Ther Ex             Quad sets  10x5'' 10x5''          Glute sets  10x5''           Ankle pumps  2x10           Seated knee flexion  2x10x5''           Supine heel slides  10x5'' straps 2x10x5'' w/ straps          Bridges   2x10 2x10 2x10 2x10 2x10 2x10 2x10 2x10 2x10   SLR   X5 w/ assist 2x10 min assist 2x10 min A 2x10 min A 2x10 2x10 2x10, 2# 2x10, 2#   HR/TR   2x10 2x10 2x10 2x10 2x10 2x10     Mini Squats   2x10 2x10         Bike (Cardiovascular Endurance)    5' lvl 0 5' lvl 0 5' lvl 0 5' lvl 0 6' lvl 1 6' lvl 1 10' lvl 1   Standing calf s'     5x15'' 5x15'' 5x15'' 5x15'' 5x15'' 5x15''   SLR  hip abd     2x10 2x10 2x10 Inc nv 2x10, 2# 2x10, 2#   LAQ     2x10 2x10 2x10, 2# 2x10, 5# 2x10, 5# 2x10, 5#   Hip hiking     2x10 lvl 2 2x10 lvl 2 2x10 lvl 2 2x10 lvl 2 2x10 lvl 2 2x10 lvl 2   Ball squats      nv 2x10 2x10 2x10 2x10   Standing HS curls       2x10, 2# 2x10, 5# 2x10, 5# 2x10, 5#   Leg press       nv 2x10 125#, 2x10 55# 2x10 135#, 2x10 65# 2x10 145#, 2x10 75#   Ther Activity             STS       2x10 no foam 2x10 2x10 2x10   Lunging          2x10 partialw/ hand rail                Step ups    2x10 lvl 2  2x10 lvl 2 2x10 lvl 2 2x10 lvl 2 2x10 lvl 2     Step up and overs    2x10 lvl 1 2x10 lvl 1 2x10 lvl 1 2x10 lvl 2 2x10 lvl 2 Full set of stairs    Gait Training             RW  5'           SPC   5' 5'         Modalities

## 2024-09-20 LAB
CHOLEST SERPL-MCNC: 198 MG/DL (ref 100–199)
CHOLEST/HDLC SERPL: 3.9 RATIO (ref 0–4.4)
HDLC SERPL-MCNC: 51 MG/DL
LDLC SERPL CALC-MCNC: 126 MG/DL (ref 0–99)
SL AMB VLDL CHOLESTEROL CALC: 21 MG/DL (ref 5–40)
TRIGL SERPL-MCNC: 120 MG/DL (ref 0–149)
TSH SERPL DL<=0.005 MIU/L-ACNC: 3.43 UIU/ML (ref 0.45–4.5)

## 2024-10-14 ENCOUNTER — TELEPHONE (OUTPATIENT)
Age: 64
End: 2024-10-14

## 2024-10-15 ENCOUNTER — HOSPITAL ENCOUNTER (OUTPATIENT)
Dept: MAMMOGRAPHY | Facility: CLINIC | Age: 64
Discharge: HOME | End: 2024-10-15
Payer: COMMERCIAL

## 2024-10-15 VITALS — BODY MASS INDEX: 34.55 KG/M2 | HEIGHT: 63 IN | WEIGHT: 195 LBS

## 2024-10-15 DIAGNOSIS — Z12.31 SCREENING MAMMOGRAM FOR BREAST CANCER: ICD-10-CM

## 2024-10-15 PROCEDURE — 77063 BREAST TOMOSYNTHESIS BI: CPT

## 2024-10-15 PROCEDURE — 77067 SCR MAMMO BI INCL CAD: CPT

## 2024-10-18 ENCOUNTER — OFFICE VISIT (OUTPATIENT)
Dept: OBGYN CLINIC | Facility: CLINIC | Age: 64
End: 2024-10-18

## 2024-10-18 VITALS
HEIGHT: 63 IN | HEART RATE: 60 BPM | DIASTOLIC BLOOD PRESSURE: 81 MMHG | BODY MASS INDEX: 34.55 KG/M2 | SYSTOLIC BLOOD PRESSURE: 142 MMHG | WEIGHT: 195 LBS

## 2024-10-18 DIAGNOSIS — Z47.1 AFTERCARE FOLLOWING RIGHT KNEE JOINT REPLACEMENT SURGERY: Primary | ICD-10-CM

## 2024-10-18 DIAGNOSIS — Z96.651 AFTERCARE FOLLOWING RIGHT KNEE JOINT REPLACEMENT SURGERY: Primary | ICD-10-CM

## 2024-10-18 PROCEDURE — 99024 POSTOP FOLLOW-UP VISIT: CPT | Performed by: STUDENT IN AN ORGANIZED HEALTH CARE EDUCATION/TRAINING PROGRAM

## 2024-10-18 NOTE — PROGRESS NOTES
Subjective: 64 y.o. female presents to the office 3 months s/p right total knee arthroplasty performed on 07/31/2024. She has been doing well overall. She has returned to all activities without restrictions. Pain controlled. Progressing well. Incision well-healed. Denies fevers or chills.     Physical Exam:  Incision: well healed  ROM: 0-120 without pain  5/5 IP/Q/HS/TA/GS, 2+ DP/PT, SILT DP/SP/S/S/TN    No new imaging obtained today    Assessment/Plan:  3 months s/p right total knee arthroplasty performed on 07/31/2024     - continue multi-modal pain control   - Weight bearing status: as tolerated  - DVT ppx: completed  - Continue PT/OT exercises  - F/U in 9 months for annual check, new x-rays upon arrival    Scribe Attestation      I,:  Latasha Moseley am acting as a scribe while in the presence of the attending physician.:       I,:  Tripp Szymanski DO personally performed the services described in this documentation    as scribed in my presence.:

## 2024-11-07 ENCOUNTER — TELEPHONE (OUTPATIENT)
Age: 64
End: 2024-11-07

## 2024-11-07 NOTE — TELEPHONE ENCOUNTER
Patient called for two separate referrals.    Patient is requesting an insurance referral for the following specialty:      Test Name / Order Name:     DX Code: H00.22 H00.025    Date Of Service:   12/12  Location/Facility Name/Address/Phone #: [facility] [phone]    Location / Facility NPI:   [id number]  Best Phone # To Reach The Patient: [phone] .               Patient is requesting an insurance referral for the following specialty:      Test Name / Order Name:     DX Code:     Date Of Service: 11/11    Location/Facility Name/Address/Phone #: [facility] [address], [address] [phone] .    Location / Facility NPI: [id number]    Best Phone # To Reach The Patient:   [phone] .

## 2024-11-11 ENCOUNTER — OFFICE VISIT (OUTPATIENT)
Dept: RHEUMATOLOGY | Facility: CLINIC | Age: 64
End: 2024-11-11
Payer: COMMERCIAL

## 2024-11-11 ENCOUNTER — TELEPHONE (OUTPATIENT)
Dept: RHEUMATOLOGY | Facility: CLINIC | Age: 64
End: 2024-11-11

## 2024-11-11 VITALS
SYSTOLIC BLOOD PRESSURE: 136 MMHG | WEIGHT: 195 LBS | HEART RATE: 87 BPM | DIASTOLIC BLOOD PRESSURE: 82 MMHG | OXYGEN SATURATION: 95 % | BODY MASS INDEX: 34.55 KG/M2 | HEIGHT: 63 IN

## 2024-11-11 DIAGNOSIS — M47.819 SPONDYLOARTHRITIS: Primary | ICD-10-CM

## 2024-11-11 DIAGNOSIS — M47.819 PERIPHERAL SPONDYLOARTHRITIS: Primary | ICD-10-CM

## 2024-11-11 DIAGNOSIS — Z11.59 SCREENING FOR VIRAL DISEASE: ICD-10-CM

## 2024-11-11 PROCEDURE — 99214 OFFICE O/P EST MOD 30 MIN: CPT | Performed by: STUDENT IN AN ORGANIZED HEALTH CARE EDUCATION/TRAINING PROGRAM

## 2024-11-11 NOTE — TELEPHONE ENCOUNTER
Rheumatology Pre-Certification Request     Medication/Disease State Information:   Diagnosis: Spondyloarthritis [M47.819]  Medication: adalimumab or biosimilar 40 mg subcutaneous Q14 days  Failed or Intolerant to or Contraindicated: csDMARDs  Comments: currently on Humira with good response to axial and peripheral symptoms but insurance will no longer cover in the new year so she needs a biosimlar to be approved. Will prescribe once I know what biosimilar they will cover    Maurilio Velez DO, CCD  NPI: [id number]  Lic: [id number]

## 2024-11-11 NOTE — PATIENT INSTRUCTIONS
Continue Humira for now    Please get your yearly Hepatitis/tuberculosis testing    We will wee if we need to submit authorization for Enbrel, which works similarly to Humira so will hopefully have a comparable effect for you. We will tentatively plan to start this in January    [employer] at [phone] ([phone]) to enroll    [email or web address]

## 2024-11-11 NOTE — TELEPHONE ENCOUNTER
Can provider please re prescribe the Humira to start PA? Last in chart as historical provider and from 09/2023. [facility].

## 2024-11-11 NOTE — ASSESSMENT & PLAN NOTE
Suspect component of spondyloarthritis with OA however the fact symptoms have improved on Humira especially the stiffnessa component of inflammatory arthropathies is present.    Plan  Screen for Hep B, C and TB while on Humira/Enbrel Q year  Since patient have responded well on it however with insurance formulary change will swtich to Enbrel then to be given once a week  RTC in 6 months

## 2024-11-11 NOTE — PROGRESS NOTES
Ambulatory Visit  Name: Arcelia Rivas      : 1960      MRN: [id number]  Encounter Provider: Maurilio Velez DO  Encounter Date: 2024   Encounter department: [facility]    Assessment & Plan  Peripheral spondyloarthritis  Suspect component of spondyloarthritis with OA however the fact symptoms have improved on Humira especially the stiffnessa component of inflammatory arthropathies is present.    Plan  Screen for Hep B, C and TB while on Humira/Enbrel Q year  Since patient have responded well on it however with insurance formulary change will swtich to Enbrel then to be given once a week  RTC in 6 months       Screening for viral disease    Orders:    Hepatitis B core antibody, total; Future    Hepatitis B surface antibody; Future    Hepatitis B surface antigen; Future    Hepatitis C antibody; Future    Quantiferon TB Gold Plus Assay; Future    Hepatitis B core antibody, total    Hepatitis B surface antibody    Hepatitis B surface antigen    Hepatitis C antibody      History of Present Illness     Arcelia Rivas is a 64 y.o. female who presents follow up visit of spondyloarthritis with peripheral arthritis on Humira, osteoarthritis who is s/p Right Knee replacement . Since last visit did also had a Dexa scan given personal history inflammatory arthirits, postmenopausal and reported decrease height 1 inch which showed L femoral T score 1.0 with Frax score 0.4%    Today states symptoms have been fairly well controlled since Humira started. Joint pain and stiffness seems to be better most prominent on the hands. Stiffness last less than an hour and improves towards the day. Theres is some pain and stiffness on days were overuse is presents R>L as she is right handed.  However denies joint swelling, redness or recent traumas.    Denies dry eyes, dry mouth, mouth sores, dysphagia, odynophagia, hematochezia, hematuria, Raynaud's symptoms.          Review of Systems  "  Constitutional:  Negative for chills and fever.   HENT:  Negative for ear pain and sore throat.    Eyes:  Negative for pain and visual disturbance.   Respiratory:  Negative for cough and shortness of breath.    Cardiovascular:  Negative for chest pain and palpitations.   Gastrointestinal:  Negative for abdominal pain and vomiting.   Genitourinary:  Negative for dysuria and hematuria.   Musculoskeletal:  Positive for arthralgias. Negative for back pain.   Skin:  Negative for color change and rash.   Neurological:  Negative for seizures and syncope.   All other systems reviewed and are negative.          Objective     /82 (BP Location: Right arm, Patient Position: Sitting, Cuff Size: Adult)   Pulse 87   Ht 5' 3\" (1.6 m)   Wt 88.5 kg (195 lb)   LMP  (LMP Unknown)   SpO2 95%   BMI 34.54 kg/m²     Physical Exam  Vitals and nursing note reviewed.   Constitutional:       General: She is not in acute distress.     Appearance: She is well-developed.   HENT:      Head: Normocephalic and atraumatic.   Eyes:      Conjunctiva/sclera: Conjunctivae normal.   Cardiovascular:      Rate and Rhythm: Normal rate and regular rhythm.      Heart sounds: No murmur heard.  Pulmonary:      Effort: Pulmonary effort is normal. No respiratory distress.      Breath sounds: Normal breath sounds.   Abdominal:      Palpations: Abdomen is soft.      Tenderness: There is no abdominal tenderness.   Musculoskeletal:         General: Deformity present. No swelling or tenderness.      Cervical back: Neck supple.      Right lower leg: No edema.      Left lower leg: No edema.   Skin:     General: Skin is warm and dry.      Capillary Refill: Capillary refill takes less than 2 seconds.   Neurological:      Mental Status: She is alert.   Psychiatric:         Mood and Affect: Mood normal.         "

## 2024-11-14 LAB
GAMMA INTERFERON BACKGROUND BLD IA-ACNC: 0 IU/ML
HBV CORE AB SERPL QL IA: NEGATIVE
HBV SURFACE AB SER-ACNC: <3.5 MIU/ML
HBV SURFACE AG SERPL QL IA: NEGATIVE
HCV AB S/CO SERPL IA: NON REACTIVE
M TB IFN-G CD4+ T-CELLS BLD-ACNC: 0 IU/ML
M TB IFN-G CD4+ T-CELLS BLD-ACNC: 0 IU/ML
MITOGEN IGNF BLD-ACNC: >10 IU/ML
QUANTIFERON INCUBATION COMMENT: NORMAL
QUANTIFERON-TB GOLD PLUS: NEGATIVE
SERVICE CMNT-IMP: NORMAL

## 2024-11-15 ENCOUNTER — RESULTS FOLLOW-UP (OUTPATIENT)
Dept: RHEUMATOLOGY | Facility: CLINIC | Age: 64
End: 2024-11-15

## 2024-12-10 ENCOUNTER — TELEPHONE (OUTPATIENT)
Age: 64
End: 2024-12-10

## 2024-12-10 NOTE — TELEPHONE ENCOUNTER
[facility] called stating they cannot get ahold of patient to deliver Adalimumab-aacf 40 MG/0.8ML AJKT     injections. Please advise.    [facility]  Phone: [phone] [address]

## 2025-01-15 ENCOUNTER — TELEPHONE (OUTPATIENT)
Age: 65
End: 2025-01-15

## 2025-01-15 DIAGNOSIS — M47.819 SPONDYLOARTHRITIS: ICD-10-CM

## 2025-01-15 NOTE — TELEPHONE ENCOUNTER
Augustine from [facility] called in requesting additional information for NOELLE Bradshaw states this information is required by 01/16/25.     [facility] # [phone]  Reference # [id number]     Please advise.

## 2025-01-15 NOTE — TELEPHONE ENCOUNTER
Giorgi from [facility] called in requesting to speak to  . Giorgi has a some follow up questions in regards to the patient medication - Enbrel .   [facility] # [phone]  Reference # [id number]

## 2025-01-16 NOTE — TELEPHONE ENCOUNTER
"Pharmacy called the RX Refill Line. Message is being forwarded to the office.     Pharmacy called back, stated that the enbrel comes in a box of 4 and the quantity that was sent on yesterdays script is for 1 pen, she also said that the directions say for \"1 dose\" but this is typically done once a week    They're asking for a new script to reflect pt using it once weekly and with the correct quantity so they can ship a whole box as they can't open it to take one pen out     Please contact pharmacy at  if any questions or send them a new script      "

## 2025-03-12 ENCOUNTER — OFFICE VISIT (OUTPATIENT)
Dept: FAMILY MEDICINE CLINIC | Facility: CLINIC | Age: 65
End: 2025-03-12
Payer: COMMERCIAL

## 2025-03-12 VITALS
TEMPERATURE: 98.2 F | RESPIRATION RATE: 16 BRPM | SYSTOLIC BLOOD PRESSURE: 130 MMHG | DIASTOLIC BLOOD PRESSURE: 84 MMHG | BODY MASS INDEX: 34.73 KG/M2 | OXYGEN SATURATION: 95 % | HEIGHT: 63 IN | HEART RATE: 83 BPM | WEIGHT: 196 LBS

## 2025-03-12 DIAGNOSIS — Z01.84 LACK OF IMMUNITY TO HEPATITIS B VIRUS DEMONSTRATED BY SEROLOGIC TEST: ICD-10-CM

## 2025-03-12 DIAGNOSIS — E78.2 MIXED HYPERLIPIDEMIA: ICD-10-CM

## 2025-03-12 DIAGNOSIS — M47.819 PERIPHERAL SPONDYLOARTHRITIS: Primary | ICD-10-CM

## 2025-03-12 PROCEDURE — 99214 OFFICE O/P EST MOD 30 MIN: CPT | Performed by: FAMILY MEDICINE

## 2025-03-12 NOTE — ASSESSMENT & PLAN NOTE
Patient will follow-up with rheumatology  Need to keep an eye on her reactions after her Enbrel shots

## 2025-03-12 NOTE — PROGRESS NOTES
"Name: Arcelia Rivas      : 1960      MRN: [id number]  Encounter Provider: Shiv Burkett DO  Encounter Date: 3/12/2025   Encounter department: [facility]  :  Assessment & Plan  Peripheral spondyloarthritis  Patient will follow-up with rheumatology  Need to keep an eye on her reactions after her Enbrel shots       Mixed hyperlipidemia  Will continue atorvastatin       Lack of immunity to hepatitis B virus demonstrated by serologic test  Discussed this finding  Offered her hep B series  She declined today but may consider in the future       Overall patient is doing well  Will continue current meds  Follow-up in 6 months or sooner if needed      Depression Screening and Follow-up Plan: Patient was screened for depression during today's encounter. They screened negative with a PHQ-9 score of 2.        History of Present Illness   Arcelia is a 64-year-old female presenting today for an annual well-visit.  She is doing well      Review of Systems   Constitutional: Negative.    HENT: Negative.     Eyes: Negative.    Respiratory: Negative.     Cardiovascular: Negative.    Gastrointestinal: Negative.    Endocrine: Negative.    Genitourinary: Negative.    Musculoskeletal: Negative.    Skin: Negative.    Allergic/Immunologic: Negative.    Neurological: Negative.    Hematological: Negative.    Psychiatric/Behavioral: Negative.     All other systems reviewed and are negative.      Objective   /84 (BP Location: Left arm, Patient Position: Sitting, Cuff Size: Standard)   Pulse 83   Temp 98.2 °F (36.8 °C) (Tympanic)   Resp 16   Ht 5' 3\" (1.6 m)   Wt 88.9 kg (196 lb)   LMP  (LMP Unknown)   SpO2 95%   BMI 34.72 kg/m²      Physical Exam  Vitals and nursing note reviewed.   Constitutional:       Appearance: Normal appearance. She is well-developed.   HENT:      Head: Normocephalic.      Right Ear: External ear normal.      Left Ear: External ear normal.      Nose: Nose normal.   Eyes:      " Conjunctiva/sclera: Conjunctivae normal.      Pupils: Pupils are equal, round, and reactive to light.   Cardiovascular:      Rate and Rhythm: Normal rate and regular rhythm.      Heart sounds: Normal heart sounds.   Pulmonary:      Effort: Pulmonary effort is normal.      Breath sounds: Normal breath sounds.   Abdominal:      General: Bowel sounds are normal.      Palpations: Abdomen is soft.   Musculoskeletal:         General: Normal range of motion.      Cervical back: Normal range of motion and neck supple.   Skin:     General: Skin is warm and dry.   Neurological:      General: No focal deficit present.      Mental Status: She is alert and oriented to person, place, and time.   Psychiatric:         Behavior: Behavior normal.         Thought Content: Thought content normal.         Judgment: Judgment normal.

## 2025-03-12 NOTE — ASSESSMENT & PLAN NOTE
Discussed this finding  Offered her hep B series  She declined today but may consider in the future

## 2025-04-15 ENCOUNTER — TELEPHONE (OUTPATIENT)
Age: 65
End: 2025-04-15

## 2025-04-15 NOTE — TELEPHONE ENCOUNTER
PRIOR AUTH    Enbrel - DENIED    [facility] states prior auth no longer valid. Insurance company states pt changed her insurance.

## 2025-04-16 NOTE — TELEPHONE ENCOUNTER
PA for Enbrel SUBMITTED to future scripts    via    []CMM-KEY:    [x]Surescripts-Case ID # [id number]   []Availity-Auth ID #  NDC #    []Faxed to plan   []Other website    []Phone call Case ID #      [x]PA sent as URGENT    All office notes, labs and other pertaining documents and studies sent. Clinical questions answered. Awaiting determination from insurance company.     Turnaround time for your insurance to make a decision on your Prior Authorization can take 7-21 business days.

## 2025-04-18 NOTE — TELEPHONE ENCOUNTER
PA for Enbrel sureclick CANCELLED due to     [x]Approval on file-dates approved 12/13/25  []Medication already on Formulary  []Brand Name Preferred  []Patient no longer covered by insurance  []Therapy Changed/Medication Discontinued    Patient advised by     []My Chart Message  [x]Phone call    Scanned into Media  yes    Faxed notice to [facility]

## 2025-04-22 NOTE — TELEPHONE ENCOUNTER
I called the patient she stated she does not have new insurance. So I called the pharmacy and was informed the patients auth is good until 12/25 and they have already shipped out her medication.

## 2025-05-13 ENCOUNTER — OFFICE VISIT (OUTPATIENT)
Dept: RHEUMATOLOGY | Facility: CLINIC | Age: 65
End: 2025-05-13

## 2025-05-13 VITALS
WEIGHT: 193 LBS | HEIGHT: 63 IN | HEART RATE: 70 BPM | DIASTOLIC BLOOD PRESSURE: 84 MMHG | OXYGEN SATURATION: 98 % | BODY MASS INDEX: 34.2 KG/M2 | SYSTOLIC BLOOD PRESSURE: 136 MMHG

## 2025-05-13 DIAGNOSIS — M19.042 PRIMARY OSTEOARTHRITIS OF BOTH HANDS: ICD-10-CM

## 2025-05-13 DIAGNOSIS — M19.041 PRIMARY OSTEOARTHRITIS OF BOTH HANDS: ICD-10-CM

## 2025-05-13 DIAGNOSIS — L40.50 PSORIATIC ARTHRITIS (HCC): Primary | ICD-10-CM

## 2025-05-13 DIAGNOSIS — Z79.60 LONG-TERM USE OF IMMUNOSUPPRESSANT MEDICATION: ICD-10-CM

## 2025-05-13 RX ORDER — AMOXICILLIN 500 MG/1
CAPSULE ORAL
COMMUNITY
Start: 2025-05-12

## 2025-05-13 RX ORDER — LEFLUNOMIDE 10 MG/1
10 TABLET ORAL DAILY
Qty: 90 TABLET | Refills: 2 | Status: SHIPPED | OUTPATIENT
Start: 2025-05-13

## 2025-05-13 RX ORDER — LIDOCAINE AND PRILOCAINE 25; 25 MG/G; MG/G
CREAM TOPICAL AS NEEDED
Qty: 30 G | Refills: 1 | Status: SHIPPED | OUTPATIENT
Start: 2025-05-13

## 2025-05-13 NOTE — ASSESSMENT & PLAN NOTE
Doing better on Enbrel, has been about 3 months and still getting some stiffness and swelling sounding very much like dactylitis. On exam does have mildly dactylitic appearance of R 2nd finger in particular, so will call this PsA    Discussed leflunomide risks:benefits, she is amenable, will add this to leflunomide   Orders:    lidocaine-prilocaine (EMLA) cream; Apply topically as needed for mild pain (injection site reaction)    leflunomide (ARAVA) 10 MG tablet; Take 1 tablet (10 mg total) by mouth daily

## 2025-05-13 NOTE — PATIENT INSTRUCTIONS
START leflunomide    Try icing your injection area prior to your next Enbrel injection, and apply emla cream after to see if it helps with your site reactions.    Get blood work (creatinine, calcium, LFTs, CBC) 2 and 4 weeks after starting leflunomide, then every 3 months thereafter.    While on your prescribed rheumatologic medication(s) leflunomide, etanercept (Enbrel): you must STOP the medication if you fall ill (ex: a viral infection, bacterial infection) and not restart it until your illness is resolved and/or you are finished with any antibiotics/antivirals/antifungals that are prescribed for you, whichever happens last.    While on the medication(s), if you are to get a vaccine, you may have to STOP the medication before and after your vaccination. See below for how long to stop your medication according to how often you take it.    LIVE ATTENUATED VACCINES (ex: MMR, rotavirus, smallpox, chickenpox, yellow fever)    Hold before vaccine Hold after vaccine   Steroids 4 weeks 4 weeks   Leflunomide 4 weeks    TNF inhibitors such as:  Etanercept 1 dosing interval (ex: for an every 4 week medication, hold for 4 weeks)      COVID-19 VACCINE    Instructions   Abatacept (IV) Time vaccine so it is given 2 weeks before next scheduled abatacept dose, then receive abatacept as scheduled   Abatacept (SQ) Delay abatacept dose for 2 weeks after vaccine   Belimumab Delay belimumab dose for 2 weeks after vaccine   Cyclophosphamide (IV) Time cyclophosphamide administration so that it will occur 1 week after each vaccine dose   Hydroxychloroquine No changes to hydroxychloroquine timing or vaccine timing   IVIG No changes to IVIG timing or vaccine timing   Rituximab Time vaccine so that it is given 2 weeks before the next scheduled rituximab dose, then receive rituximab as scheduled   All others Delay rheumatologic medication dose for 2 weeks after vaccine, if disease activity allows     INFLUENZA VACCINE    Hold before vaccine  Hold after vaccine   Methotrexate 1 week 2 weeks if able   Rituximab  n/a 2 weeks   All others CONTINUE, DO NOT HOLD CONTINUE, DO NOT HOLD     ALL OTHER VACCINES    Hold before vaccine Hold after vaccine   Methotrexate CONTINUE, DO NOT HOLD CONTINUE, DO NOT HOLD   Rituximab  n/a Time vaccination for when next dose is due, then give rituximab at least 2 weeks after vaccine   All others CONTINUE, DO NOT HOLD CONTINUE, DO NOT HOLD     These instructions are consistent with the recommendations set forth by the American College of Rheumatology (ACR).    Leflunomide (Arava)    Leflunomide (Arava) is a drug approved to treat adults with moderate to severe rheumatoid arthritis along with other rheumatic diseases. It belongs to a class of medications called disease-modifying antirheumatic drugs (DMARDs), which aim to decrease inflammation and damage.  Leflunomide blocks the formation of DNA, which is important for replicating cells, such as those in the immune system. It suppresses the immune system to reduce inflammation, thereby reducing pain and swelling.    How To Take It  Leflunomide usually is given as a 20 mg tablet once a day. Sometimes, patients are given 10 mg, if they experienced side effects with the higher dose. Leflunomide should be taken with food. Complete benefits may not be experienced until 6 to12 weeks after starting the medication.  It is important that you have regular blood tests, including those for liver function and blood counts, while taking this medication. You should not take leflunomide if you have a pre-existing liver disease, such as hepatitis or cirrhosis. Alcohol use should be avoided when taking lefunomide as this may increase the risk of liver damage. This medication should also be avoided in pregnancy and contraception is highly recommended in female patients taking lefunomide.    Side Effects  The most common side effect of leflunomide is diarrhea, which occurs in approximately 20% of  patients. This symptom frequently improves with time or by taking a medication to prevent diarrhea. If diarrhea persists, the dose of leflunomide may need to be reduced.  Less common side effects include nausea, stomach pain, indigestion, rash, and hair loss. In fewer than 10% of patients, leflunomide can cause abnormal liver function tests or decreased blood cell or platelet counts. Rarely, this drug may cause lung problems, such as cough, shortness of breath or lung injury.    Tell Your Rheumatology Provider  If you are pregnant or are considering having a child, you should discuss this with your rheumatology provider before beginning leflunomide as this medication can cause serious birth defects. Breastfeeding while taking leflunomide is not recommended. Use of an effective form of birth control is critical throughout the course of this treatment. Men taking leflunomide who wish to have a child also should talk with their rheumatology provider about possible risks. Cholestyramine is a medication you can take to help remove leflunomide from your system.  Live vaccinations should be avoided while taking this medication. Be sure to discuss any vaccines with your rheumatology provider before receiving them.  It is important you inform your rheumatologist if you have any infections or are planning to undergo any surgeries as leflunomide can interfere with recovery.    Updated February 2024 by Yamileth Valle MD, and reviewed by the American College of Rheumatology Committee on Communications and Marketing. This information is provided for general education only.

## 2025-05-13 NOTE — PROGRESS NOTES
Name: Arcelia Rivas      : 1960      MRN: [id number]  Encounter Provider: Maurilio Velez DO  Encounter Date: 2025   Encounter department: [facility]  :  Assessment & Plan  Psoriatic arthritis (HCC)  Doing better on Enbrel, has been about 3 months and still getting some stiffness and swelling sounding very much like dactylitis. On exam does have mildly dactylitic appearance of R 2nd finger in particular, so will call this PsA    Discussed leflunomide risks:benefits, she is amenable, will add this to leflunomide   Orders:    lidocaine-prilocaine (EMLA) cream; Apply topically as needed for mild pain (injection site reaction)    leflunomide (ARAVA) 10 MG tablet; Take 1 tablet (10 mg total) by mouth daily    Long-term use of immunosuppressant medication    Orders:    Creatinine, serum; Standing    Calcium; Standing    Hepatic function panel; Standing    Creatinine, serum; Standing    Calcium; Standing    Hepatic function panel; Standing    CBC and differential; Standing    CBC and differential; Standing    Primary osteoarthritis of both hands  Feel this is responsible for the majority of her DIP complaints         Patient's rheumatologic disease(s) threaten long-term function if not appropriately managed.    Patient's rheumatologic medication(s) require intensive monitoring for toxicity.     History of Present Illness     Due to insurance issues, had to switch to Enbrel. Didn't start until mid-Feb    Has been getting injection sit reactions with Enbrel, rotates site but each reaction lasts about a week. The first one or two were warm and itchy, but no longer. Not raised. No pain.    Feeling like the Enbrel is starting to help; back pain improving. Still getting swelling in the R 2nd and 3rd fingers    Permanent history: First saw Dr. Rich  for reportedly inflammatory spinal pain and morning stiffness in the joints, started sulfasalazine with improvement in symptoms. Then  "saw Hillary Hernandez PA-C Jul 2023 and was started by her on Humira in Fall 2023; sulfasalazine was stopped due to leukopenia that resolved after stopping.    First saw me Nov 2024, felt that much of her symptomatology was OA-related but given significant improvement in stiffness on Humira felt that there was also an inflammatory component; decided to continue treating as a peripheral SpA.      Objective   /84   Pulse 70   Ht 5' 3\" (1.6 m)   Wt 87.5 kg (193 lb)   LMP  (LMP Unknown)   SpO2 98%   BMI 34.19 kg/m²      General: Well appearing, in no distress.   Eyes: Sclera non-icteric. EOMI  Extremities: Warm, well perfused, no edema.   Neuro: Alert and oriented. No gross focal neurological deficits.   Skin: No rashes.  MSK exam: mild dactylitic appearance of R 2nd finger. Significant Heberden nodes  "

## 2025-05-30 ENCOUNTER — RESULTS FOLLOW-UP (OUTPATIENT)
Dept: RHEUMATOLOGY | Facility: CLINIC | Age: 65
End: 2025-05-30

## 2025-05-30 DIAGNOSIS — Z79.60 LONG-TERM USE OF IMMUNOSUPPRESSANT MEDICATION: Primary | ICD-10-CM

## 2025-06-11 ENCOUNTER — RESULTS FOLLOW-UP (OUTPATIENT)
Dept: RHEUMATOLOGY | Facility: CLINIC | Age: 65
End: 2025-06-11

## 2025-06-13 ENCOUNTER — RESULTS FOLLOW-UP (OUTPATIENT)
Dept: RHEUMATOLOGY | Facility: CLINIC | Age: 65
End: 2025-06-13

## 2025-06-13 LAB
ALBUMIN SERPL-MCNC: 4.4 G/DL (ref 3.9–4.9)
ALP SERPL-CCNC: 75 IU/L (ref 44–121)
ALT SERPL-CCNC: 34 IU/L (ref 0–32)
AST SERPL-CCNC: 24 IU/L (ref 0–40)
BASOPHILS # BLD AUTO: 0.1 X10E3/UL (ref 0–0.2)
BASOPHILS NFR BLD AUTO: 2 %
BILIRUB DIRECT SERPL-MCNC: 0.24 MG/DL (ref 0–0.4)
BILIRUB SERPL-MCNC: 0.7 MG/DL (ref 0–1.2)
CALCIUM SERPL-MCNC: 9.9 MG/DL (ref 8.7–10.3)
CREAT SERPL-MCNC: 0.97 MG/DL (ref 0.57–1)
EGFR: 66 ML/MIN/1.73
EOSINOPHIL # BLD AUTO: 0.1 X10E3/UL (ref 0–0.4)
EOSINOPHIL NFR BLD AUTO: 2 %
ERYTHROCYTE [DISTWIDTH] IN BLOOD BY AUTOMATED COUNT: 12.3 % (ref 11.7–15.4)
HCT VFR BLD AUTO: 45.5 % (ref 34–46.6)
HGB BLD-MCNC: 15.6 G/DL (ref 11.1–15.9)
IMM GRANULOCYTES # BLD: 0 X10E3/UL (ref 0–0.1)
IMM GRANULOCYTES NFR BLD: 0 %
LYMPHOCYTES # BLD AUTO: 1.3 X10E3/UL (ref 0.7–3.1)
LYMPHOCYTES NFR BLD AUTO: 32 %
MCH RBC QN AUTO: 32 PG (ref 26.6–33)
MCHC RBC AUTO-ENTMCNC: 34.3 G/DL (ref 31.5–35.7)
MCV RBC AUTO: 93 FL (ref 79–97)
MONOCYTES # BLD AUTO: 0.5 X10E3/UL (ref 0.1–0.9)
MONOCYTES NFR BLD AUTO: 13 %
NEUTROPHILS # BLD AUTO: 2.1 X10E3/UL (ref 1.4–7)
NEUTROPHILS NFR BLD AUTO: 51 %
PLATELET # BLD AUTO: 145 X10E3/UL (ref 150–450)
PROT SERPL-MCNC: 6.5 G/DL (ref 6–8.5)
RBC # BLD AUTO: 4.88 X10E6/UL (ref 3.77–5.28)
WBC # BLD AUTO: 4 X10E3/UL (ref 3.4–10.8)

## 2025-07-18 ENCOUNTER — APPOINTMENT (OUTPATIENT)
Dept: RADIOLOGY | Facility: CLINIC | Age: 65
End: 2025-07-18
Attending: STUDENT IN AN ORGANIZED HEALTH CARE EDUCATION/TRAINING PROGRAM
Payer: COMMERCIAL

## 2025-07-18 ENCOUNTER — OFFICE VISIT (OUTPATIENT)
Dept: OBGYN CLINIC | Facility: CLINIC | Age: 65
End: 2025-07-18
Payer: COMMERCIAL

## 2025-07-18 VITALS — WEIGHT: 193 LBS | HEIGHT: 63 IN | BODY MASS INDEX: 34.2 KG/M2

## 2025-07-18 DIAGNOSIS — Z96.651 AFTERCARE FOLLOWING RIGHT KNEE JOINT REPLACEMENT SURGERY: Primary | ICD-10-CM

## 2025-07-18 DIAGNOSIS — Z96.651 AFTERCARE FOLLOWING RIGHT KNEE JOINT REPLACEMENT SURGERY: ICD-10-CM

## 2025-07-18 DIAGNOSIS — Z47.1 AFTERCARE FOLLOWING RIGHT KNEE JOINT REPLACEMENT SURGERY: ICD-10-CM

## 2025-07-18 DIAGNOSIS — Z47.1 AFTERCARE FOLLOWING RIGHT KNEE JOINT REPLACEMENT SURGERY: Primary | ICD-10-CM

## 2025-07-18 PROCEDURE — 99213 OFFICE O/P EST LOW 20 MIN: CPT | Performed by: STUDENT IN AN ORGANIZED HEALTH CARE EDUCATION/TRAINING PROGRAM

## 2025-07-18 PROCEDURE — 73562 X-RAY EXAM OF KNEE 3: CPT

## 2025-07-18 NOTE — ASSESSMENT & PLAN NOTE
65 y.o. female  doing well 1 year s/p right total knee arthroplasty performed on 07/31/2024.  Xrays of the right knee reviewed today showing prosthesis in good position with no signs of loosening.  Range of motion and stability are excellent. She was encouraged to continue activities as tolerated. Note provided for dentist stating she no longer requires dental prophylaxis prior to dental procedures. Follow up in 5 years, new x-rays upon arrival.    Orders:  •  XR knee 3 vw right non injury; Future

## 2025-07-18 NOTE — LETTER
July 18, 2025     Patient: Arcelia Rivas   YOB: 1960   Date of Visit: 7/18/2025       To Whom it May Concern:    Arcelia Rivas was seen in my clinic on 7/18/2025. She no longer requires dental prophylaxis prior to her dental procedures.     If you have any questions or concerns, please don't hesitate to call.         Sincerely,          Tripp Szymanski, DO

## 2025-07-18 NOTE — PROGRESS NOTES
Knee Post Op Office Note    Assessment:     1. Aftercare following right knee joint replacement surgery        Plan:  Assessment & Plan  Aftercare following right knee joint replacement surgery  65 y.o. female  doing well 1 year s/p right total knee arthroplasty performed on 07/31/2024.  Xrays of the right knee reviewed today showing prosthesis in good position with no signs of loosening.  Range of motion and stability are excellent. She was encouraged to continue activities as tolerated. Note provided for dentist stating she no longer requires dental prophylaxis prior to dental procedures. Follow up in 5 years, new x-rays upon arrival.    Orders:  •  XR knee 3 vw right non injury; Future      Subjective:     Patient ID: Arcelia Rivas is a 65 y.o. female.    Chief Complaint:  HPI:  Patient is a 65 y.o. female here today for annual post op evaluation of their right knee.  She is 1 year s/p right total knee arthroplasty performed on 07/31/2024. She has been doing well overall. She has returned to all activities without limitations. She is happy with her progress thus far. She notes occasional feelings of hyperextending her knee.    Allergy:  Allergies[1]  Medications:  all current active meds have been reviewed  Past Medical History:  Past Medical History[1]  Past Surgical History:  Past Surgical History[1]  Family History:  Family History[1]  Social History:  Social History     Substance and Sexual Activity   Alcohol Use Yes    Comment: Very occasionally     Social History     Substance and Sexual Activity   Drug Use No     Tobacco Use History[1]      ROS:  General: Per HPI  Skin: Negative, except if noted below  HEENT: Negative  Respiratory: Negative  Cardiovascular: Negative  Gastrointestinal: Negative  Urinary: Negative  Vascular: Negative  Musculoskeletal: Positive per HPI   Neurologic: Positive per HPI  Endocrine: Negative    Objective:  BP Readings from Last 1 Encounters:   05/13/25 136/84      Wt Readings from  "Last 1 Encounters:   07/18/25 87.5 kg (193 lb)        Respiratory:   non-labored respirations    Lymphatics:  no palpable lymph nodes    Gait:   Steady    Neurologic:   Alert and oriented times 3  Patient with normal sensation except as noted below  Deep tendon reflexes 2+ except as noted in MSK exam    Bilateral Lower Extremity:  Right Knee:      Inspection:  well healed incision    Overall limb alignment neutral     Effusion: none    ROM 0-120 without pain    Extensor Lag: none    Palpation: no Joint line tenderness to palpation    AP Stability at 90 deg stable    M/L stability in full extension stable    M/L stability in midflexion stable    Motor: 5/5 Q/HS/TA/GS/P    Pulses: 2+ DP / 2+ PT    SILT DP/SP/S/S/TN      Imaging:  My interpretation XR AP knee/lateral/erwin/sunrise right knee: s/p cemented CR attune TKA, components in good position. No fracture or dislocation.    BMI:   Estimated body mass index is 34.19 kg/m² as calculated from the following:    Height as of this encounter: 5' 3\" (1.6 m).    Weight as of this encounter: 87.5 kg (193 lb).  BSA:   Estimated body surface area is 1.9 meters squared as calculated from the following:    Height as of this encounter: 5' 3\" (1.6 m).    Weight as of this encounter: 87.5 kg (193 lb).           Scribe Attestation    I,:  Latasha Moseley am acting as a scribe while in the presence of the attending physician.:       I,:  Tripp Szymanski DO personally performed the services described in this documentation    as scribed in my presence.:                   [1]  No Known Allergies[1]  Past Medical History:  Diagnosis Date   • Abnormal mammogram     RESOLVED 15AUG2017   • Allergic     Seasonal   • Arthritis    • Colon polyp    • Constipation     After surgery   • Depression    • GERD (gastroesophageal reflux disease)     diet managed, occasional   • Hallux rigidus     LAST ASSESSED 21JUL2015 L grreat toe   • Heart murmur From birth   • Hyperlipidemia    • Lyme " disease     LAST ASSESSED 18JUN2014   • Murmur, cardiac     functional   • Trigger finger     ,r hand ring finger and middle; L same fingers   • Trigger middle finger of left hand 04/12/2022   • Trigger ring finger of left hand 04/12/2022   • Wears glasses    [1]  Past Surgical History:  Procedure Laterality Date   • COLONOSCOPY      LAST ASSESSED [id number]   • DILATION AND CURETTAGE OF UTERUS     • ENDOMETRIAL ABLATION     • ENDOMETRIAL CRYOABLATION      WITH ULTRASOUND GUIDE    • FOOT SURGERY  1/19,11/22   • HAND SURGERY  6/22   • IR EPIDURAL STEROID INJECTION     • KNEE ARTHROSCOPY      b/l meniscus   • OK ARTHRODESIS GREAT TOE METATARSOPHALANGEAL JOINT Left 01/18/2019    Procedure: GREAT TOE FUSION WITH PLATE;  Surgeon: Anthony Tristan DPM;  Location: [facility];  Service: Podiatry   • OK ARTHRODESIS GREAT TOE METATARSOPHALANGEAL JOINT Right 11/18/2022    Procedure: ARTHRODESIS / FUSION FOOT;  Surgeon: Anthony Tristan DPM;  Location: [facility];  Service: Podiatry   • OK ARTHRP KNE CONDYLE&PLATU MEDIAL&LAT COMPARTMENTS Right 07/31/2024    Procedure: ARTHROPLASTY KNEE TOTAL- SAME DAY;  Surgeon: Tripp Szymanski DO;  Location: [facility];  Service: Orthopedics   • OK TENDON SHEATH INCISION Left 04/29/2022    Procedure: RELEASE TRIGGER FINGER, LEFT MIDDLE AND RING;  Surgeon: Ida Ron MD;  Location: [facility];  Service: Orthopedics   • OK TENDON SHEATH INCISION Right 06/22/2022    Procedure: RELEASE TRIGGER FINGER, RIGHT MIDDLE AND RING FINGER;  Surgeon: Ida Ron MD;  Location: [facility];  Service: Orthopedics   • TONSILLECTOMY AND ADENOIDECTOMY     [1]  Family History  Problem Relation Name Age of Onset   • Heart disease Mother Bernice         Heart failure and valve replacement   • Colon polyps Mother Bernice    • Stroke Mother Bernice    • COPD Mother Bernice    • Hypertension Father Momo    • Kidney failure Father Momo    • Hyperlipidemia Father Momo    • Lung cancer Sister MORGAN 60   • Breast cancer  Sister JESUS 76        DCIS   • Asthma Daughter RAINA    • Breast cancer Maternal Grandmother Charlotte 40   • No Known Problems Maternal Grandfather     • Breast cancer Paternal Grandmother Viola 80   • Arthritis Paternal Grandmother Viola    • Osteoarthritis Paternal Grandmother Viola    • Arthritis Paternal Grandfather Viola    • No Known Problems Son     • Breast cancer Maternal Aunt SURYA    • No Known Problems Maternal Aunt MAREN    • No Known Problems Maternal Aunt ANA    • No Known Problems Maternal Aunt CIARAN    • No Known Problems Paternal Aunt MARGARITA    • Coronary artery disease Family     • Hypertension Family     • Cancer Sister Abi         Lung cancer with the ALK gene   [1]  Social History  Tobacco Use   Smoking Status Never   • Passive exposure: Never   Smokeless Tobacco Never

## (undated) DEVICE — SYRINGE 10ML LL

## (undated) DEVICE — CHLORAPREP HI-LITE 10.5ML ORANGE

## (undated) DEVICE — NEEDLE 18 G X 1 1/2

## (undated) DEVICE — OCCLUSIVE GAUZE STRIP,3% BISMUTH TRIBROMOPHENATE IN PETROLATUM BLEND: Brand: XEROFORM

## (undated) DEVICE — CHLORAPREP HI-LITE 26ML ORANGE

## (undated) DEVICE — NEEDLE 25G X 1 1/2

## (undated) DEVICE — CEMENT MIXING SYSTEM WITH FEMORAL BREAKWAY NOZZLE: Brand: REVOLUTION

## (undated) DEVICE — CONCAVE SURFACING REAMER

## (undated) DEVICE — PAD CAST 6 IN COTTON NON STERILE

## (undated) DEVICE — UNTHREADED GUIDE WIRE
Type: IMPLANTABLE DEVICE | Site: FOOT | Status: NON-FUNCTIONAL
Brand: FIXOS
Removed: 2022-11-18

## (undated) DEVICE — GLOVE SRG BIOGEL 6.5

## (undated) DEVICE — K-WIRE, SMOOTH
Type: IMPLANTABLE DEVICE | Site: FOOT | Status: NON-FUNCTIONAL
Brand: VARIAX
Removed: 2019-01-18

## (undated) DEVICE — SUT ETHILON 4-0 PS-2 18 IN 1667H

## (undated) DEVICE — CURITY NON-ADHERENT STRIPS: Brand: CURITY

## (undated) DEVICE — DRILL BIT, AO, SCALED: Brand: VARIAX

## (undated) DEVICE — IMPERVIOUS STOCKINETTE: Brand: DEROYAL

## (undated) DEVICE — PAD GROUNDING ADULT

## (undated) DEVICE — SCD SEQUENTIAL COMPRESSION COMFORT SLEEVE MEDIUM KNEE LENGTH: Brand: KENDALL SCD

## (undated) DEVICE — GAUZE SPONGES,16 PLY: Brand: CURITY

## (undated) DEVICE — GLOVE SRG BIOGEL ORTHOPEDIC 7.5

## (undated) DEVICE — BETHLEHEM UNIVERSAL  MIONR EXT: Brand: CARDINAL HEALTH

## (undated) DEVICE — DUAL CUT SAGITTAL BLADE

## (undated) DEVICE — SURGICAL GOWN, XL SMARTSLEEVE: Brand: CONVERTORS

## (undated) DEVICE — CAST PADDING 4 IN SYNTHETIC NON-STRL

## (undated) DEVICE — INTENDED FOR TISSUE SEPARATION, AND OTHER PROCEDURES THAT REQUIRE A SHARP SURGICAL BLADE TO PUNCTURE OR CUT.: Brand: BARD-PARKER ® CARBON RIB-BACK BLADES

## (undated) DEVICE — INTENDED FOR TISSUE SEPARATION, AND OTHER PROCEDURES THAT REQUIRE A SHARP SURGICAL BLADE TO PUNCTURE OR CUT.: Brand: BARD-PARKER SAFETY BLADES SIZE 15, STERILE

## (undated) DEVICE — PENCIL ELECTROSURG E-Z CLEAN -0035H

## (undated) DEVICE — 10FR FRAZIER SUCTION HANDLE: Brand: CARDINAL HEALTH

## (undated) DEVICE — BETHLEHEM UNIV MAJ EXT ,KIT: Brand: CARDINAL HEALTH

## (undated) DEVICE — CAPIT KNEE ATTUNE FB W/DOM

## (undated) DEVICE — DRAPE C-ARMOUR

## (undated) DEVICE — SPONGE SCRUB 4 PCT CHLORHEXIDINE

## (undated) DEVICE — STERI DRAPE 1000 NON-STERILE ROLL

## (undated) DEVICE — UNIVERSAL MAJOR EXTREMITY,KIT: Brand: CARDINAL HEALTH

## (undated) DEVICE — GLOVE SRG BIOGEL 7.5

## (undated) DEVICE — CAST PADDING 3 IN UNSTERILE

## (undated) DEVICE — TAPE CAST 4IN FIBERGLASS 4YD WHITE

## (undated) DEVICE — SPONGE LAP 18 X 18 IN STRL RFD

## (undated) DEVICE — BANDAGE, ESMARK LF STR 4"X9'(20/CS): Brand: CYPRESS

## (undated) DEVICE — CONVEX REAMER

## (undated) DEVICE — REAMER FOR CROSS-PLATES: Brand: ANCHORAGE

## (undated) DEVICE — ACE WRAP 6 IN UNSTERILE

## (undated) DEVICE — PADDING CAST 4 IN  COTTON STRL

## (undated) DEVICE — SUT VICRYL 2-0 CT-1 27 IN J259H

## (undated) DEVICE — SPONGE LAP 18 X 18 IN

## (undated) DEVICE — BONE STIMULATOR NON INVASIVE

## (undated) DEVICE — U-DRAPE: Brand: CONVERTORS

## (undated) DEVICE — GLOVE SRG BIOGEL 6

## (undated) DEVICE — SYRINGE 3ML LL

## (undated) DEVICE — ANTIBACTERIAL UNDYED BRAIDED (POLYGLACTIN 910), SYNTHETIC ABSORBABLE SUTURE: Brand: COATED VICRYL

## (undated) DEVICE — ACE WRAP 4 IN UNSTERILE

## (undated) DEVICE — DRAPE C-ARM 48 X 84 IN F/ OEC MINIVIEW 6800

## (undated) DEVICE — X-RAY DETECTABLE SPONGES,16 PLY: Brand: VISTEC

## (undated) DEVICE — KERLIX BANDAGE ROLL: Brand: KERLIX

## (undated) DEVICE — SYRINGE 30ML LL

## (undated) DEVICE — TAPE CAST 5IN FIBERGLASS 4YD WHITE

## (undated) DEVICE — GLOVE INDICATOR PI UNDERGLOVE SZ 8.5 BLUE

## (undated) DEVICE — SPLINT ORTHO-GLASS 3IN X 15FT

## (undated) DEVICE — GLOVE INDICATOR PI UNDERGLOVE SZ 7.5 BLUE

## (undated) DEVICE — EXOFIN PRECISION PEN HIGH VISCOSITY TOPICAL SKIN ADHESIVE: Brand: EXOFIN PRECISION PEN, 1G

## (undated) DEVICE — GLOVE INDICATOR PI UNDERGLOVE SZ 6.5 BLUE

## (undated) DEVICE — SAW BLADE MICRO SAGGITAL 4.5MM X 25.5 X .024

## (undated) DEVICE — MEDI-VAC YANKAUER SUCTION HANDLE W/BULBOUS AND CONTROL VENT: Brand: CARDINAL HEALTH

## (undated) DEVICE — CURITY STRETCH BANDAGE: Brand: CURITY

## (undated) DEVICE — 3M™ IOBAN™ 2 ANTIMICROBIAL INCISE DRAPE 6650EZ: Brand: IOBAN™ 2

## (undated) DEVICE — HOOD: Brand: FLYTE, SURGICOOL

## (undated) DEVICE — STERILE BETHLEHEM PLASTIC HAND: Brand: CARDINAL HEALTH

## (undated) DEVICE — CAST PADDING 6IN UNSTERILE

## (undated) DEVICE — PAD CAST 4 IN COTTON NON STERILE

## (undated) DEVICE — CUFF TOURNIQUET 18 X 4 IN QUICK CONNECT DISP 1 BLADDER

## (undated) DEVICE — 450 ML BOTTLE OF 0.05% CHLORHEXIDINE GLUCONATE IN 99.95% STERILE WATER FOR IRRIGATION, USP AND APPLICATOR.: Brand: IRRISEPT ANTIMICROBIAL WOUND LAVAGE

## (undated) DEVICE — DRESSING MEPILEX AG BORDER POST-OP 4 X 12 IN

## (undated) DEVICE — STRETCH BANDAGE: Brand: CURITY

## (undated) DEVICE — 3M™ DURAPORE™ SURGICAL TAPE 1538-3, 3 INCH X 10 YARD (7,5CM X 9,1M), 4 ROLLS/BOX: Brand: 3M™ DURAPORE™

## (undated) DEVICE — DRAPE C-ARM X-RAY

## (undated) DEVICE — TUBING SUCTION 5MM X 12 FT

## (undated) DEVICE — DRESSING XEROFORM 5 X 9

## (undated) DEVICE — GLOVE INDICATOR PI UNDERGLOVE SZ 8 BLUE

## (undated) DEVICE — SCREWDRIVER BLADE T8 AO, SELF RETAINING: Brand: VARIAX

## (undated) DEVICE — HOLDING PIN
Type: IMPLANTABLE DEVICE | Site: FOOT | Status: NON-FUNCTIONAL
Brand: ANCHORAGE
Removed: 2022-11-18

## (undated) DEVICE — CAST PADDING 4 IN STERILE

## (undated) DEVICE — INTENDED FOR TISSUE SEPARATION, AND OTHER PROCEDURES THAT REQUIRE A SHARP SURGICAL BLADE TO PUNCTURE OR CUT.: Brand: BARD-PARKER SAFETY BLADES SIZE 10, STERILE

## (undated) DEVICE — 3M™ STERI-DRAPE™ U-DRAPE 1015: Brand: STERI-DRAPE™

## (undated) DEVICE — HANDPIECE SET WITH RETRACTABLE COAXIAL FAN SPRAY TIP AND SUCTION TUBE: Brand: INTERPULSE

## (undated) DEVICE — PLUMEPEN PRO 10FT

## (undated) DEVICE — CP LAG SCREW (T8)
Type: IMPLANTABLE DEVICE | Site: FOOT | Status: NON-FUNCTIONAL
Brand: ANCHORAGE
Removed: 2019-01-18

## (undated) DEVICE — ACE WRAP 3 IN STERILE

## (undated) DEVICE — HEAVY DUTY TABLE COVER: Brand: CONVERTORS

## (undated) DEVICE — YELLOW BOAT

## (undated) DEVICE — ANTIBACTERIAL VIOLET BRAIDED (POLYGLACTIN 910), SYNTHETIC ABSORBABLE SURGICAL SUTURE: Brand: COATED VICRYL

## (undated) DEVICE — BLADE SAGITTAL 25.6 X 9.5MM

## (undated) DEVICE — GLOVE SRG BIOGEL ORTHOPEDIC 8.5

## (undated) DEVICE — 2000CC GUARDIAN II: Brand: GUARDIAN

## (undated) DEVICE — WEBRIL 6 IN UNSTERILE

## (undated) DEVICE — SUT STRATAFIX SPIRAL 1-0 PDO 36 X 36 CM SXPD2B405

## (undated) DEVICE — GLOVE SRG BIOGEL 8.5

## (undated) DEVICE — COBAN 6 IN STERILE

## (undated) DEVICE — KIRSCHNER WIRE , L, TIPS TROCAR , SMOOTH
Type: IMPLANTABLE DEVICE | Site: FOOT | Status: NON-FUNCTIONAL
Removed: 2022-11-18

## (undated) DEVICE — SUT STRATAFIX SPIRAL 3-0 PGA/PCL 30 X 30 CM SXMD2B408

## (undated) DEVICE — SUT FIBERLOOP 2-0 STRT DIAMOND PT 13IN AR-7232-03

## (undated) DEVICE — JACKSON-PRATT 100CC BULB RESERVOIR: Brand: CARDINAL HEALTH